# Patient Record
Sex: FEMALE | Employment: OTHER | URBAN - METROPOLITAN AREA
[De-identification: names, ages, dates, MRNs, and addresses within clinical notes are randomized per-mention and may not be internally consistent; named-entity substitution may affect disease eponyms.]

---

## 2018-01-01 ENCOUNTER — HOSPITAL ENCOUNTER (OUTPATIENT)
Dept: PHARMACY | Age: 83
Setting detail: THERAPIES SERIES
Discharge: HOME | End: 2018-09-28
Payer: MEDICARE

## 2018-01-01 ENCOUNTER — HOSPITAL ENCOUNTER (OUTPATIENT)
Age: 83
Discharge: HOME | End: 2018-11-07
Payer: MEDICARE

## 2018-01-01 ENCOUNTER — HOSPITAL ENCOUNTER (OUTPATIENT)
Age: 83
Discharge: HOME | End: 2018-06-15
Payer: MEDICARE

## 2018-01-01 ENCOUNTER — APPOINTMENT (OUTPATIENT)
Dept: CT IMAGING | Age: 83
DRG: 086 | End: 2018-01-01
Payer: MEDICARE

## 2018-01-01 ENCOUNTER — HOSPITAL ENCOUNTER (OUTPATIENT)
Dept: PHARMACY | Age: 83
Setting detail: THERAPIES SERIES
Discharge: HOME | End: 2018-08-01
Payer: MEDICARE

## 2018-01-01 ENCOUNTER — HOSPITAL ENCOUNTER (OUTPATIENT)
Dept: PHARMACY | Age: 83
Setting detail: THERAPIES SERIES
Discharge: HOME | End: 2018-12-05
Payer: MEDICARE

## 2018-01-01 ENCOUNTER — HOSPITAL ENCOUNTER (OUTPATIENT)
Dept: PHARMACY | Age: 83
Setting detail: THERAPIES SERIES
Discharge: HOME | End: 2018-06-22
Payer: MEDICARE

## 2018-01-01 ENCOUNTER — OFFICE VISIT (OUTPATIENT)
Dept: CARDIOLOGY CLINIC | Age: 83
End: 2018-01-01
Payer: MEDICARE

## 2018-01-01 ENCOUNTER — HOSPITAL ENCOUNTER (OUTPATIENT)
Dept: PHARMACY | Age: 83
Setting detail: THERAPIES SERIES
Discharge: HOME | End: 2018-07-25
Payer: MEDICARE

## 2018-01-01 ENCOUNTER — HOSPITAL ENCOUNTER (OUTPATIENT)
Dept: PHARMACY | Age: 83
Setting detail: THERAPIES SERIES
Discharge: HOME | End: 2018-11-07
Payer: MEDICARE

## 2018-01-01 ENCOUNTER — TELEPHONE (OUTPATIENT)
Dept: FAMILY MEDICINE CLINIC | Age: 83
End: 2018-01-01

## 2018-01-01 ENCOUNTER — ANTI-COAG VISIT (OUTPATIENT)
Dept: FAMILY MEDICINE CLINIC | Age: 83
End: 2018-01-01

## 2018-01-01 ENCOUNTER — CARE COORDINATION (OUTPATIENT)
Dept: CASE MANAGEMENT | Age: 83
End: 2018-01-01

## 2018-01-01 ENCOUNTER — HOSPITAL ENCOUNTER (OUTPATIENT)
Dept: PHARMACY | Age: 83
Setting detail: THERAPIES SERIES
Discharge: HOME | End: 2018-09-19
Payer: MEDICARE

## 2018-01-01 ENCOUNTER — HOSPITAL ENCOUNTER (INPATIENT)
Age: 83
LOS: 7 days | Discharge: HOME HEALTH | DRG: 177 | End: 2018-07-06
Attending: EMERGENCY MEDICINE | Admitting: INTERNAL MEDICINE
Payer: MEDICARE

## 2018-01-01 ENCOUNTER — HOSPITAL ENCOUNTER (OUTPATIENT)
Dept: PHARMACY | Age: 83
Setting detail: THERAPIES SERIES
Discharge: HOME | End: 2018-08-24
Payer: MEDICARE

## 2018-01-01 ENCOUNTER — APPOINTMENT (OUTPATIENT)
Dept: GENERAL RADIOLOGY | Age: 83
DRG: 177 | End: 2018-01-01
Payer: MEDICARE

## 2018-01-01 ENCOUNTER — HOSPITAL ENCOUNTER (OUTPATIENT)
Age: 83
Discharge: HOME | End: 2018-07-30
Payer: MEDICARE

## 2018-01-01 ENCOUNTER — TELEPHONE (OUTPATIENT)
Dept: PHARMACY | Facility: CLINIC | Age: 83
End: 2018-01-01

## 2018-01-01 ENCOUNTER — HOSPITAL ENCOUNTER (OUTPATIENT)
Dept: PHARMACY | Age: 83
Setting detail: THERAPIES SERIES
Discharge: HOME | End: 2018-10-19
Payer: MEDICARE

## 2018-01-01 ENCOUNTER — HOSPITAL ENCOUNTER (OUTPATIENT)
Age: 83
Discharge: HOME | End: 2018-08-01
Payer: MEDICARE

## 2018-01-01 ENCOUNTER — TELEPHONE (OUTPATIENT)
Dept: PHARMACY | Age: 83
End: 2018-01-01

## 2018-01-01 ENCOUNTER — OFFICE VISIT (OUTPATIENT)
Dept: FAMILY MEDICINE CLINIC | Age: 83
End: 2018-01-01
Payer: MEDICARE

## 2018-01-01 ENCOUNTER — HOSPITAL ENCOUNTER (OUTPATIENT)
Dept: GENERAL RADIOLOGY | Age: 83
Discharge: HOME | End: 2018-08-01
Payer: MEDICARE

## 2018-01-01 ENCOUNTER — HOSPITAL ENCOUNTER (OUTPATIENT)
Dept: PHARMACY | Age: 83
Setting detail: THERAPIES SERIES
Discharge: HOME | End: 2018-08-15
Payer: MEDICARE

## 2018-01-01 ENCOUNTER — HOSPITAL ENCOUNTER (OUTPATIENT)
Dept: PHARMACY | Age: 83
Setting detail: THERAPIES SERIES
Discharge: HOME | End: 2018-07-12
Payer: MEDICARE

## 2018-01-01 ENCOUNTER — HOSPITAL ENCOUNTER (INPATIENT)
Age: 83
LOS: 1 days | Discharge: HOME | DRG: 086 | End: 2018-12-07
Attending: EMERGENCY MEDICINE | Admitting: SURGERY
Payer: MEDICARE

## 2018-01-01 ENCOUNTER — HOSPITAL ENCOUNTER (OUTPATIENT)
Dept: PHARMACY | Age: 83
Setting detail: THERAPIES SERIES
Discharge: HOME | End: 2018-07-19
Payer: MEDICARE

## 2018-01-01 VITALS
SYSTOLIC BLOOD PRESSURE: 137 MMHG | WEIGHT: 93.8 LBS | BODY MASS INDEX: 18.32 KG/M2 | DIASTOLIC BLOOD PRESSURE: 76 MMHG | HEART RATE: 65 BPM

## 2018-01-01 VITALS
OXYGEN SATURATION: 98 % | TEMPERATURE: 98.3 F | WEIGHT: 91.5 LBS | RESPIRATION RATE: 18 BRPM | DIASTOLIC BLOOD PRESSURE: 60 MMHG | BODY MASS INDEX: 17.96 KG/M2 | HEIGHT: 60 IN | HEART RATE: 78 BPM | SYSTOLIC BLOOD PRESSURE: 112 MMHG

## 2018-01-01 VITALS
BODY MASS INDEX: 17.89 KG/M2 | WEIGHT: 91.6 LBS | HEART RATE: 69 BPM | DIASTOLIC BLOOD PRESSURE: 88 MMHG | SYSTOLIC BLOOD PRESSURE: 157 MMHG

## 2018-01-01 VITALS
BODY MASS INDEX: 17.97 KG/M2 | DIASTOLIC BLOOD PRESSURE: 84 MMHG | SYSTOLIC BLOOD PRESSURE: 153 MMHG | WEIGHT: 92 LBS | HEART RATE: 71 BPM

## 2018-01-01 VITALS
HEIGHT: 60 IN | HEART RATE: 66 BPM | DIASTOLIC BLOOD PRESSURE: 70 MMHG | WEIGHT: 90.8 LBS | RESPIRATION RATE: 28 BRPM | BODY MASS INDEX: 17.83 KG/M2 | SYSTOLIC BLOOD PRESSURE: 130 MMHG

## 2018-01-01 VITALS
WEIGHT: 94 LBS | TEMPERATURE: 96.5 F | HEIGHT: 60 IN | BODY MASS INDEX: 18.46 KG/M2 | OXYGEN SATURATION: 95 % | DIASTOLIC BLOOD PRESSURE: 61 MMHG | SYSTOLIC BLOOD PRESSURE: 131 MMHG | RESPIRATION RATE: 16 BRPM | HEART RATE: 72 BPM

## 2018-01-01 VITALS
BODY MASS INDEX: 17.69 KG/M2 | DIASTOLIC BLOOD PRESSURE: 85 MMHG | SYSTOLIC BLOOD PRESSURE: 139 MMHG | HEART RATE: 76 BPM | WEIGHT: 90.6 LBS

## 2018-01-01 VITALS
SYSTOLIC BLOOD PRESSURE: 150 MMHG | BODY MASS INDEX: 17.54 KG/M2 | HEART RATE: 79 BPM | DIASTOLIC BLOOD PRESSURE: 78 MMHG | WEIGHT: 89.8 LBS

## 2018-01-01 VITALS
BODY MASS INDEX: 17.89 KG/M2 | DIASTOLIC BLOOD PRESSURE: 77 MMHG | HEART RATE: 71 BPM | SYSTOLIC BLOOD PRESSURE: 133 MMHG | WEIGHT: 91.6 LBS

## 2018-01-01 VITALS
BODY MASS INDEX: 17.04 KG/M2 | RESPIRATION RATE: 16 BRPM | DIASTOLIC BLOOD PRESSURE: 51 MMHG | WEIGHT: 92.6 LBS | OXYGEN SATURATION: 100 % | SYSTOLIC BLOOD PRESSURE: 107 MMHG | HEART RATE: 79 BPM | TEMPERATURE: 98.1 F | HEIGHT: 62 IN

## 2018-01-01 VITALS
SYSTOLIC BLOOD PRESSURE: 106 MMHG | DIASTOLIC BLOOD PRESSURE: 68 MMHG | TEMPERATURE: 98.1 F | WEIGHT: 91.5 LBS | HEIGHT: 60 IN | OXYGEN SATURATION: 91 % | HEART RATE: 69 BPM | RESPIRATION RATE: 18 BRPM | BODY MASS INDEX: 17.96 KG/M2

## 2018-01-01 VITALS
DIASTOLIC BLOOD PRESSURE: 80 MMHG | HEART RATE: 69 BPM | WEIGHT: 91.6 LBS | BODY MASS INDEX: 17.89 KG/M2 | SYSTOLIC BLOOD PRESSURE: 134 MMHG

## 2018-01-01 VITALS
WEIGHT: 91.2 LBS | HEART RATE: 64 BPM | BODY MASS INDEX: 17.81 KG/M2 | SYSTOLIC BLOOD PRESSURE: 115 MMHG | DIASTOLIC BLOOD PRESSURE: 75 MMHG

## 2018-01-01 DIAGNOSIS — S06.5XAA SUBDURAL HEMATOMA: ICD-10-CM

## 2018-01-01 DIAGNOSIS — J15.1 PNEUMONIA OF RIGHT LOWER LOBE DUE TO PSEUDOMONAS SPECIES (HCC): ICD-10-CM

## 2018-01-01 DIAGNOSIS — I48.0 PAROXYSMAL ATRIAL FIBRILLATION (HCC): ICD-10-CM

## 2018-01-01 DIAGNOSIS — F41.9 ANXIETY: ICD-10-CM

## 2018-01-01 DIAGNOSIS — I48.0 PAROXYSMAL ATRIAL FIBRILLATION (HCC): Primary | ICD-10-CM

## 2018-01-01 DIAGNOSIS — I25.10 CORONARY ARTERY DISEASE INVOLVING NATIVE CORONARY ARTERY OF NATIVE HEART WITHOUT ANGINA PECTORIS: Chronic | ICD-10-CM

## 2018-01-01 DIAGNOSIS — Z23 NEED FOR INFLUENZA VACCINATION: ICD-10-CM

## 2018-01-01 DIAGNOSIS — E03.9 HYPOTHYROIDISM, UNSPECIFIED TYPE: ICD-10-CM

## 2018-01-01 DIAGNOSIS — E78.5 HYPERLIPIDEMIA, UNSPECIFIED HYPERLIPIDEMIA TYPE: ICD-10-CM

## 2018-01-01 DIAGNOSIS — Z79.01 LONG TERM CURRENT USE OF ANTICOAGULANT: ICD-10-CM

## 2018-01-01 DIAGNOSIS — I48.91 ATRIAL FIBRILLATION, UNSPECIFIED TYPE (HCC): ICD-10-CM

## 2018-01-01 DIAGNOSIS — I25.810 CORONARY ARTERY DISEASE INVOLVING CORONARY BYPASS GRAFT OF NATIVE HEART WITHOUT ANGINA PECTORIS: ICD-10-CM

## 2018-01-01 DIAGNOSIS — J44.1 CHRONIC OBSTRUCTIVE PULMONARY DISEASE WITH ACUTE EXACERBATION (HCC): ICD-10-CM

## 2018-01-01 DIAGNOSIS — Z79.01 LONG TERM CURRENT USE OF ANTICOAGULANT: Primary | ICD-10-CM

## 2018-01-01 DIAGNOSIS — S06.5XAA SDH (SUBDURAL HEMATOMA): Primary | ICD-10-CM

## 2018-01-01 DIAGNOSIS — R09.02 HYPOXEMIA: Primary | ICD-10-CM

## 2018-01-01 DIAGNOSIS — F41.9 ANXIETY: Primary | ICD-10-CM

## 2018-01-01 DIAGNOSIS — I25.10 CORONARY ARTERY DISEASE INVOLVING NATIVE CORONARY ARTERY OF NATIVE HEART WITHOUT ANGINA PECTORIS: ICD-10-CM

## 2018-01-01 DIAGNOSIS — I10 ESSENTIAL HYPERTENSION: ICD-10-CM

## 2018-01-01 DIAGNOSIS — J44.1 CHRONIC OBSTRUCTIVE PULMONARY DISEASE WITH ACUTE EXACERBATION (HCC): Primary | ICD-10-CM

## 2018-01-01 DIAGNOSIS — E03.8 OTHER SPECIFIED HYPOTHYROIDISM: ICD-10-CM

## 2018-01-01 DIAGNOSIS — J44.9 OBSTRUCTIVE CHRONIC BRONCHITIS WITHOUT EXACERBATION (HCC): ICD-10-CM

## 2018-01-01 DIAGNOSIS — J18.9 PNEUMONIA DUE TO ORGANISM: Primary | ICD-10-CM

## 2018-01-01 DIAGNOSIS — S09.90XA INJURY OF HEAD, INITIAL ENCOUNTER: Primary | ICD-10-CM

## 2018-01-01 DIAGNOSIS — I25.810 CORONARY ARTERY DISEASE INVOLVING CORONARY BYPASS GRAFT OF NATIVE HEART WITHOUT ANGINA PECTORIS: Primary | Chronic | ICD-10-CM

## 2018-01-01 LAB
% INHIBITION AA: 53.9 %
% INHIBITION ADP: 0 %
ABO/RH: NORMAL
ABO/RH: NORMAL
ALBUMIN SERPL-MCNC: 3.2 G/DL (ref 3.5–5.2)
ALBUMIN SERPL-MCNC: 3.9 G/DL (ref 3.5–5.2)
ALBUMIN SERPL-MCNC: 3.9 G/DL (ref 3.5–5.2)
ALBUMIN SERPL-MCNC: 4.2 G/DL (ref 3.5–5.2)
ALP BLD-CCNC: 64 U/L (ref 35–104)
ALP BLD-CCNC: 65 U/L (ref 35–104)
ALP BLD-CCNC: 82 U/L (ref 35–104)
ALP BLD-CCNC: 87 U/L (ref 35–104)
ALT SERPL-CCNC: 20 U/L (ref 0–32)
ALT SERPL-CCNC: 23 U/L (ref 0–32)
ALT SERPL-CCNC: 25 U/L (ref 0–32)
ALT SERPL-CCNC: 30 U/L (ref 0–32)
ANGLE (CLOT STRENGTH): 76.2 DEGREE (ref 59–74)
ANION GAP SERPL CALCULATED.3IONS-SCNC: 10 MMOL/L (ref 7–16)
ANION GAP SERPL CALCULATED.3IONS-SCNC: 10 MMOL/L (ref 7–16)
ANION GAP SERPL CALCULATED.3IONS-SCNC: 11 MMOL/L (ref 7–16)
ANION GAP SERPL CALCULATED.3IONS-SCNC: 8 MMOL/L (ref 7–16)
ANTIBODY SCREEN: NORMAL
AST SERPL-CCNC: 29 U/L (ref 0–31)
AST SERPL-CCNC: 31 U/L (ref 0–31)
AST SERPL-CCNC: 31 U/L (ref 0–31)
AST SERPL-CCNC: 39 U/L (ref 0–31)
BASOPHILS ABSOLUTE: 0.02 E9/L (ref 0–0.2)
BASOPHILS ABSOLUTE: 0.03 E9/L (ref 0–0.2)
BASOPHILS RELATIVE PERCENT: 0.2 % (ref 0–2)
BASOPHILS RELATIVE PERCENT: 0.4 % (ref 0–2)
BASOPHILS RELATIVE PERCENT: 0.7 % (ref 0–2)
BILIRUB SERPL-MCNC: 0.5 MG/DL (ref 0–1.2)
BILIRUBIN URINE: NEGATIVE
BLOOD BANK DISPENSE STATUS: NORMAL
BLOOD BANK DISPENSE STATUS: NORMAL
BLOOD BANK PRODUCT CODE: NORMAL
BLOOD BANK PRODUCT CODE: NORMAL
BLOOD CULTURE, ROUTINE: NORMAL
BLOOD, URINE: NEGATIVE
BPU ID: NORMAL
BPU ID: NORMAL
BUN BLDV-MCNC: 19 MG/DL (ref 8–23)
BUN BLDV-MCNC: 21 MG/DL (ref 8–23)
BUN BLDV-MCNC: 24 MG/DL (ref 8–23)
BUN BLDV-MCNC: 25 MG/DL (ref 8–23)
BUN BLDV-MCNC: 26 MG/DL (ref 8–23)
BUN BLDV-MCNC: 28 MG/DL (ref 8–23)
BUN BLDV-MCNC: 30 MG/DL (ref 8–23)
BUN BLDV-MCNC: 58 MG/DL (ref 8–23)
CALCIUM SERPL-MCNC: 9 MG/DL (ref 8.6–10.2)
CALCIUM SERPL-MCNC: 9.2 MG/DL (ref 8.6–10.2)
CALCIUM SERPL-MCNC: 9.3 MG/DL (ref 8.6–10.2)
CALCIUM SERPL-MCNC: 9.4 MG/DL (ref 8.6–10.2)
CALCIUM SERPL-MCNC: 9.5 MG/DL (ref 8.6–10.2)
CALCIUM SERPL-MCNC: 9.7 MG/DL (ref 8.6–10.2)
CALCIUM SERPL-MCNC: 9.7 MG/DL (ref 8.6–10.2)
CALCIUM SERPL-MCNC: 9.9 MG/DL (ref 8.6–10.2)
CHLORIDE BLD-SCNC: 101 MMOL/L (ref 98–107)
CHLORIDE BLD-SCNC: 102 MMOL/L (ref 98–107)
CHLORIDE BLD-SCNC: 102 MMOL/L (ref 98–107)
CHLORIDE BLD-SCNC: 96 MMOL/L (ref 98–107)
CHLORIDE BLD-SCNC: 96 MMOL/L (ref 98–107)
CHLORIDE BLD-SCNC: 97 MMOL/L (ref 98–107)
CHLORIDE BLD-SCNC: 98 MMOL/L (ref 98–107)
CHLORIDE BLD-SCNC: 99 MMOL/L (ref 98–107)
CHOLESTEROL, TOTAL: 190 MG/DL (ref 0–199)
CHOLESTEROL, TOTAL: 222 MG/DL (ref 0–199)
CLARITY: CLEAR
CO2: 32 MMOL/L (ref 22–29)
CO2: 33 MMOL/L (ref 22–29)
CO2: 34 MMOL/L (ref 22–29)
CO2: 35 MMOL/L (ref 22–29)
CO2: 35 MMOL/L (ref 22–29)
CO2: 36 MMOL/L (ref 22–29)
CO2: 40 MMOL/L (ref 22–29)
CO2: 40 MMOL/L (ref 22–29)
COLOR: YELLOW
CREAT SERPL-MCNC: 0.7 MG/DL (ref 0.5–1)
CREAT SERPL-MCNC: 0.8 MG/DL (ref 0.5–1)
CREAT SERPL-MCNC: 0.9 MG/DL (ref 0.5–1)
CREAT SERPL-MCNC: 1 MG/DL (ref 0.5–1)
CULTURE, BLOOD 2: NORMAL
CULTURE, RESPIRATORY: ABNORMAL
CULTURE, RESPIRATORY: ABNORMAL
DESCRIPTION BLOOD BANK: NORMAL
DESCRIPTION BLOOD BANK: NORMAL
EKG ATRIAL RATE: 106 BPM
EKG P-R INTERVAL: 144 MS
EKG Q-T INTERVAL: 324 MS
EKG QRS DURATION: 76 MS
EKG QTC CALCULATION (BAZETT): 430 MS
EKG R AXIS: 75 DEGREES
EKG T AXIS: -54 DEGREES
EKG VENTRICULAR RATE: 106 BPM
EOSINOPHILS ABSOLUTE: 0.01 E9/L (ref 0.05–0.5)
EOSINOPHILS ABSOLUTE: 0.08 E9/L (ref 0.05–0.5)
EOSINOPHILS ABSOLUTE: 0.09 E9/L (ref 0.05–0.5)
EOSINOPHILS ABSOLUTE: 0.1 E9/L (ref 0.05–0.5)
EOSINOPHILS ABSOLUTE: 0.11 E9/L (ref 0.05–0.5)
EOSINOPHILS RELATIVE PERCENT: 0.1 % (ref 0–6)
EOSINOPHILS RELATIVE PERCENT: 1.6 % (ref 0–6)
EOSINOPHILS RELATIVE PERCENT: 1.6 % (ref 0–6)
EOSINOPHILS RELATIVE PERCENT: 2 % (ref 0–6)
EOSINOPHILS RELATIVE PERCENT: 2.5 % (ref 0–6)
EPL-TEG: 0.4 % (ref 0–15)
FILM ARRAY ADENOVIRUS: ABNORMAL
FILM ARRAY BORDETELLA PERTUSSIS: ABNORMAL
FILM ARRAY CHLAMYDOPHILIA PNEUMONIAE: ABNORMAL
FILM ARRAY CORONAVIRUS 229E: ABNORMAL
FILM ARRAY CORONAVIRUS HKU1: ABNORMAL
FILM ARRAY CORONAVIRUS NL63: ABNORMAL
FILM ARRAY CORONAVIRUS OC43: ABNORMAL
FILM ARRAY INFLUENZA A VIRUS 09H1: ABNORMAL
FILM ARRAY INFLUENZA A VIRUS H1: ABNORMAL
FILM ARRAY INFLUENZA A VIRUS H3: ABNORMAL
FILM ARRAY INFLUENZA A VIRUS: ABNORMAL
FILM ARRAY INFLUENZA B: ABNORMAL
FILM ARRAY METAPNEUMOVIRUS: ABNORMAL
FILM ARRAY MYCOPLASMA PNEUMONIAE: ABNORMAL
FILM ARRAY PARAINFLUENZA VIRUS 1: ABNORMAL
FILM ARRAY PARAINFLUENZA VIRUS 2: ABNORMAL
FILM ARRAY PARAINFLUENZA VIRUS 4: ABNORMAL
FILM ARRAY RESPIRATORY SYNCITIAL VIRUS: ABNORMAL
FILM ARRAY RHINOVIRUS/ENTEROVIRUS: ABNORMAL
G-TEG: 8.5 K D/SC (ref 4.5–11)
GFR AFRICAN AMERICAN: >60
GFR NON-AFRICAN AMERICAN: 53 ML/MIN/1.73
GFR NON-AFRICAN AMERICAN: 60 ML/MIN/1.73
GFR NON-AFRICAN AMERICAN: >60 ML/MIN/1.73
GLUCOSE BLD-MCNC: 102 MG/DL (ref 74–109)
GLUCOSE BLD-MCNC: 119 MG/DL (ref 74–99)
GLUCOSE BLD-MCNC: 123 MG/DL (ref 74–109)
GLUCOSE BLD-MCNC: 132 MG/DL (ref 74–109)
GLUCOSE BLD-MCNC: 75 MG/DL (ref 74–109)
GLUCOSE BLD-MCNC: 87 MG/DL (ref 74–109)
GLUCOSE BLD-MCNC: 89 MG/DL (ref 74–99)
GLUCOSE BLD-MCNC: 93 MG/DL (ref 74–99)
GLUCOSE URINE: NEGATIVE MG/DL
HCT VFR BLD CALC: 26.7 % (ref 34–48)
HCT VFR BLD CALC: 28.2 % (ref 34–48)
HCT VFR BLD CALC: 30.8 % (ref 34–48)
HCT VFR BLD CALC: 34.5 % (ref 34–48)
HCT VFR BLD CALC: 34.7 % (ref 34–48)
HCT VFR BLD CALC: 35.8 % (ref 34–48)
HCT VFR BLD CALC: 37.7 % (ref 34–48)
HCT VFR BLD CALC: 38.2 % (ref 34–48)
HDLC SERPL-MCNC: 107 MG/DL
HDLC SERPL-MCNC: 134 MG/DL
HEMOGLOBIN: 10.6 G/DL (ref 11.5–15.5)
HEMOGLOBIN: 10.7 G/DL (ref 11.5–15.5)
HEMOGLOBIN: 11.2 G/DL (ref 11.5–15.5)
HEMOGLOBIN: 11.8 G/DL (ref 11.5–15.5)
HEMOGLOBIN: 12 G/DL (ref 11.5–15.5)
HEMOGLOBIN: 8 G/DL (ref 11.5–15.5)
HEMOGLOBIN: 8.6 G/DL (ref 11.5–15.5)
HEMOGLOBIN: 9.4 G/DL (ref 11.5–15.5)
HYPOCHROMIA: ABNORMAL
IMMATURE GRANULOCYTES #: 0.01 E9/L
IMMATURE GRANULOCYTES #: 0.01 E9/L
IMMATURE GRANULOCYTES #: 0.02 E9/L
IMMATURE GRANULOCYTES #: 0.02 E9/L
IMMATURE GRANULOCYTES #: 0.07 E9/L
IMMATURE GRANULOCYTES %: 0.2 % (ref 0–5)
IMMATURE GRANULOCYTES %: 0.2 % (ref 0–5)
IMMATURE GRANULOCYTES %: 0.4 % (ref 0–5)
IMMATURE GRANULOCYTES %: 0.4 % (ref 0–5)
IMMATURE GRANULOCYTES %: 0.6 % (ref 0–5)
INR BLD: 1.1
INR BLD: 1.2
INR BLD: 1.9
INR BLD: 2
INR BLD: 2.1
INR BLD: 2.2
INR BLD: 2.2
INR BLD: 2.4
INR BLD: 2.4
INR BLD: 2.6
INR BLD: 2.8
INR BLD: 2.9
INR BLD: 3
INR BLD: 3.9
INTERNATIONAL NORMALIZATION RATIO, POC: 1.7
INTERNATIONAL NORMALIZATION RATIO, POC: 1.9
INTERNATIONAL NORMALIZATION RATIO, POC: 2.2
INTERNATIONAL NORMALIZATION RATIO, POC: 2.4
INTERNATIONAL NORMALIZATION RATIO, POC: 2.5
INTERNATIONAL NORMALIZATION RATIO, POC: 2.7
INTERNATIONAL NORMALIZATION RATIO, POC: 3.8
INTERNATIONAL NORMALIZATION RATIO, POC: 4.4
K (CLOTTING TIME): 0.9 MIN (ref 1–3)
KETONES, URINE: NEGATIVE MG/DL
L. PNEUMOPHILA SEROGP 1 UR AG: NORMAL
LACTIC ACID: 1.2 MMOL/L (ref 0.5–2.2)
LDL CHOLESTEROL CALCULATED: 71 MG/DL (ref 0–99)
LDL CHOLESTEROL CALCULATED: 73 MG/DL (ref 0–99)
LEUKOCYTE ESTERASE, URINE: NEGATIVE
LY30 (FIBRINOLYSIS): 0.4 % (ref 0–8)
LYMPHOCYTES ABSOLUTE: 0.39 E9/L (ref 1.5–4)
LYMPHOCYTES ABSOLUTE: 0.68 E9/L (ref 1.5–4)
LYMPHOCYTES ABSOLUTE: 0.72 E9/L (ref 1.5–4)
LYMPHOCYTES ABSOLUTE: 0.82 E9/L (ref 1.5–4)
LYMPHOCYTES ABSOLUTE: 0.88 E9/L (ref 1.5–4)
LYMPHOCYTES RELATIVE PERCENT: 13.9 % (ref 20–42)
LYMPHOCYTES RELATIVE PERCENT: 15.9 % (ref 20–42)
LYMPHOCYTES RELATIVE PERCENT: 16.4 % (ref 20–42)
LYMPHOCYTES RELATIVE PERCENT: 16.8 % (ref 20–42)
LYMPHOCYTES RELATIVE PERCENT: 3.2 % (ref 20–42)
MA (MAX AMPLITUDE): 62.9 MM (ref 50–70)
MA-AA: 34.9 MM
MA-ACTIVATED: 11 MM
MA-ADP: 71 MM
MA-TEG BASELINE: 62.9 MM
MAGNESIUM: 1.4 MG/DL (ref 1.6–2.6)
MCH RBC QN AUTO: 28.3 PG (ref 26–35)
MCH RBC QN AUTO: 28.4 PG (ref 26–35)
MCH RBC QN AUTO: 28.4 PG (ref 26–35)
MCH RBC QN AUTO: 31.5 PG (ref 26–35)
MCH RBC QN AUTO: 31.8 PG (ref 26–35)
MCH RBC QN AUTO: 32 PG (ref 26–35)
MCH RBC QN AUTO: 32.2 PG (ref 26–35)
MCH RBC QN AUTO: 32.5 PG (ref 26–35)
MCHC RBC AUTO-ENTMCNC: 29.9 % (ref 32–34.5)
MCHC RBC AUTO-ENTMCNC: 30 % (ref 32–34.5)
MCHC RBC AUTO-ENTMCNC: 30.5 % (ref 32–34.5)
MCHC RBC AUTO-ENTMCNC: 30.5 % (ref 32–34.5)
MCHC RBC AUTO-ENTMCNC: 30.7 % (ref 32–34.5)
MCHC RBC AUTO-ENTMCNC: 31.3 % (ref 32–34.5)
MCHC RBC AUTO-ENTMCNC: 31.4 % (ref 32–34.5)
MCHC RBC AUTO-ENTMCNC: 32.3 % (ref 32–34.5)
MCV RBC AUTO: 100.6 FL (ref 80–99.9)
MCV RBC AUTO: 101.6 FL (ref 80–99.9)
MCV RBC AUTO: 102.4 FL (ref 80–99.9)
MCV RBC AUTO: 104.2 FL (ref 80–99.9)
MCV RBC AUTO: 105.3 FL (ref 80–99.9)
MCV RBC AUTO: 93.1 FL (ref 80–99.9)
MCV RBC AUTO: 93.1 FL (ref 80–99.9)
MCV RBC AUTO: 94.3 FL (ref 80–99.9)
MONOCYTES ABSOLUTE: 0.46 E9/L (ref 0.1–0.95)
MONOCYTES ABSOLUTE: 0.47 E9/L (ref 0.1–0.95)
MONOCYTES ABSOLUTE: 0.48 E9/L (ref 0.1–0.95)
MONOCYTES ABSOLUTE: 0.62 E9/L (ref 0.1–0.95)
MONOCYTES ABSOLUTE: 0.84 E9/L (ref 0.1–0.95)
MONOCYTES RELATIVE PERCENT: 11 % (ref 2–12)
MONOCYTES RELATIVE PERCENT: 12.7 % (ref 2–12)
MONOCYTES RELATIVE PERCENT: 6.9 % (ref 2–12)
MONOCYTES RELATIVE PERCENT: 8.3 % (ref 2–12)
MONOCYTES RELATIVE PERCENT: 9.6 % (ref 2–12)
NEUTROPHILS ABSOLUTE: 10.9 E9/L (ref 1.8–7.3)
NEUTROPHILS ABSOLUTE: 3.03 E9/L (ref 1.8–7.3)
NEUTROPHILS ABSOLUTE: 3.31 E9/L (ref 1.8–7.3)
NEUTROPHILS ABSOLUTE: 3.61 E9/L (ref 1.8–7.3)
NEUTROPHILS ABSOLUTE: 4.09 E9/L (ref 1.8–7.3)
NEUTROPHILS RELATIVE PERCENT: 67.7 % (ref 43–80)
NEUTROPHILS RELATIVE PERCENT: 69.2 % (ref 43–80)
NEUTROPHILS RELATIVE PERCENT: 73.6 % (ref 43–80)
NEUTROPHILS RELATIVE PERCENT: 74.1 % (ref 43–80)
NEUTROPHILS RELATIVE PERCENT: 89 % (ref 43–80)
NITRITE, URINE: NEGATIVE
ORGANISM: ABNORMAL
ORGANISM: ABNORMAL
PDW BLD-RTO: 13.2 FL (ref 11.5–15)
PDW BLD-RTO: 13.2 FL (ref 11.5–15)
PDW BLD-RTO: 13.4 FL (ref 11.5–15)
PDW BLD-RTO: 13.7 FL (ref 11.5–15)
PDW BLD-RTO: 14.7 FL (ref 11.5–15)
PDW BLD-RTO: 15.9 FL (ref 11.5–15)
PDW BLD-RTO: 15.9 FL (ref 11.5–15)
PDW BLD-RTO: 16 FL (ref 11.5–15)
PH UA: 8 (ref 5–9)
PLATELET # BLD: 182 E9/L (ref 130–450)
PLATELET # BLD: 216 E9/L (ref 130–450)
PLATELET # BLD: 224 E9/L (ref 130–450)
PLATELET # BLD: 231 E9/L (ref 130–450)
PLATELET # BLD: 231 E9/L (ref 130–450)
PLATELET # BLD: 236 E9/L (ref 130–450)
PLATELET # BLD: 237 E9/L (ref 130–450)
PLATELET # BLD: 311 E9/L (ref 130–450)
PMV BLD AUTO: 10.1 FL (ref 7–12)
PMV BLD AUTO: 10.3 FL (ref 7–12)
PMV BLD AUTO: 10.4 FL (ref 7–12)
PMV BLD AUTO: 9.6 FL (ref 7–12)
PMV BLD AUTO: 9.7 FL (ref 7–12)
PMV BLD AUTO: 9.7 FL (ref 7–12)
PMV BLD AUTO: 9.8 FL (ref 7–12)
PMV BLD AUTO: 9.8 FL (ref 7–12)
POLYCHROMASIA: ABNORMAL
POTASSIUM REFLEX MAGNESIUM: 3.5 MMOL/L (ref 3.5–5)
POTASSIUM REFLEX MAGNESIUM: 3.9 MMOL/L (ref 3.5–5)
POTASSIUM SERPL-SCNC: 3.8 MMOL/L (ref 3.5–5)
POTASSIUM SERPL-SCNC: 4 MMOL/L (ref 3.5–5)
POTASSIUM SERPL-SCNC: 4.1 MMOL/L (ref 3.5–5)
POTASSIUM SERPL-SCNC: 4.2 MMOL/L (ref 3.5–5)
POTASSIUM SERPL-SCNC: 4.2 MMOL/L (ref 3.5–5)
POTASSIUM SERPL-SCNC: 4.8 MMOL/L (ref 3.5–5)
PRO-BNP: 1093 PG/ML (ref 0–450)
PRO-BNP: 1480 PG/ML (ref 0–450)
PROTEIN UA: NEGATIVE MG/DL
PROTHROMBIN TIME: 13 SEC (ref 9.3–12.4)
PROTHROMBIN TIME: 13.7 SEC (ref 9.3–12.4)
PROTHROMBIN TIME: 22.3 SEC (ref 9.3–12.4)
PROTHROMBIN TIME: 23.3 SEC (ref 9.3–12.4)
PROTHROMBIN TIME: 25.6 SEC (ref 9.3–12.4)
PROTHROMBIN TIME: 27.6 SEC (ref 9.3–12.4)
PROTHROMBIN TIME: 27.6 SEC (ref 9.3–12.4)
PROTHROMBIN TIME: 29.3 SEC (ref 9.3–12.4)
PROTHROMBIN TIME: 30.5 SEC (ref 9.3–12.4)
PROTHROMBIN TIME: 32.7 SEC (ref 9.3–12.4)
PROTHROMBIN TIME: 34.1 SEC (ref 9.3–12.4)
PROTHROMBIN TIME: 43.3 SEC (ref 9.3–12.4)
R (REACTION TIME): 5.6 MIN (ref 5–10)
RBC # BLD: 2.83 E12/L (ref 3.5–5.5)
RBC # BLD: 3.03 E12/L (ref 3.5–5.5)
RBC # BLD: 3.31 E12/L (ref 3.5–5.5)
RBC # BLD: 3.31 E12/L (ref 3.5–5.5)
RBC # BLD: 3.4 E12/L (ref 3.5–5.5)
RBC # BLD: 3.45 E12/L (ref 3.5–5.5)
RBC # BLD: 3.71 E12/L (ref 3.5–5.5)
RBC # BLD: 3.73 E12/L (ref 3.5–5.5)
SMEAR, RESPIRATORY: ABNORMAL
SODIUM BLD-SCNC: 142 MMOL/L (ref 132–146)
SODIUM BLD-SCNC: 142 MMOL/L (ref 132–146)
SODIUM BLD-SCNC: 144 MMOL/L (ref 132–146)
SODIUM BLD-SCNC: 145 MMOL/L (ref 132–146)
SODIUM BLD-SCNC: 146 MMOL/L (ref 132–146)
SODIUM BLD-SCNC: 146 MMOL/L (ref 132–146)
SPECIFIC GRAVITY UA: 1.01 (ref 1–1.03)
STREP PNEUMONIAE ANTIGEN, URINE: NORMAL
TOTAL PROTEIN: 6.3 G/DL (ref 6.4–8.3)
TOTAL PROTEIN: 6.5 G/DL (ref 6.4–8.3)
TOTAL PROTEIN: 6.7 G/DL (ref 6.4–8.3)
TOTAL PROTEIN: 6.8 G/DL (ref 6.4–8.3)
TRIGL SERPL-MCNC: 61 MG/DL (ref 0–149)
TRIGL SERPL-MCNC: 77 MG/DL (ref 0–149)
TROPONIN: 0.01 NG/ML (ref 0–0.03)
TSH SERPL DL<=0.05 MIU/L-ACNC: 4.62 UIU/ML (ref 0.27–4.2)
UROBILINOGEN, URINE: 0.2 E.U./DL
VLDLC SERPL CALC-MCNC: 12 MG/DL
VLDLC SERPL CALC-MCNC: 15 MG/DL
WBC # BLD: 10.8 E9/L (ref 4.5–11.5)
WBC # BLD: 12.2 E9/L (ref 4.5–11.5)
WBC # BLD: 12.8 E9/L (ref 4.5–11.5)
WBC # BLD: 4.4 E9/L (ref 4.5–11.5)
WBC # BLD: 4.8 E9/L (ref 4.5–11.5)
WBC # BLD: 4.9 E9/L (ref 4.5–11.5)
WBC # BLD: 4.9 E9/L (ref 4.5–11.5)
WBC # BLD: 5.6 E9/L (ref 4.5–11.5)

## 2018-01-01 PROCEDURE — 71045 X-RAY EXAM CHEST 1 VIEW: CPT

## 2018-01-01 PROCEDURE — 87186 SC STD MICRODIL/AGAR DIL: CPT

## 2018-01-01 PROCEDURE — 6370000000 HC RX 637 (ALT 250 FOR IP): Performed by: STUDENT IN AN ORGANIZED HEALTH CARE EDUCATION/TRAINING PROGRAM

## 2018-01-01 PROCEDURE — 80053 COMPREHEN METABOLIC PANEL: CPT

## 2018-01-01 PROCEDURE — 6360000002 HC RX W HCPCS: Performed by: INTERNAL MEDICINE

## 2018-01-01 PROCEDURE — G0008 ADMIN INFLUENZA VIRUS VAC: HCPCS | Performed by: INTERNAL MEDICINE

## 2018-01-01 PROCEDURE — 96365 THER/PROPH/DIAG IV INF INIT: CPT

## 2018-01-01 PROCEDURE — 2500000003 HC RX 250 WO HCPCS: Performed by: EMERGENCY MEDICINE

## 2018-01-01 PROCEDURE — 93000 ELECTROCARDIOGRAM COMPLETE: CPT | Performed by: INTERNAL MEDICINE

## 2018-01-01 PROCEDURE — 6370000000 HC RX 637 (ALT 250 FOR IP): Performed by: INTERNAL MEDICINE

## 2018-01-01 PROCEDURE — 94640 AIRWAY INHALATION TREATMENT: CPT

## 2018-01-01 PROCEDURE — 3288F FALL RISK ASSESSMENT DOCD: CPT | Performed by: INTERNAL MEDICINE

## 2018-01-01 PROCEDURE — 99211 OFF/OP EST MAY X REQ PHY/QHP: CPT

## 2018-01-01 PROCEDURE — 2580000003 HC RX 258: Performed by: INTERNAL MEDICINE

## 2018-01-01 PROCEDURE — G8988 SELF CARE GOAL STATUS: HCPCS

## 2018-01-01 PROCEDURE — 85027 COMPLETE CBC AUTOMATED: CPT

## 2018-01-01 PROCEDURE — 85610 PROTHROMBIN TIME: CPT

## 2018-01-01 PROCEDURE — 97161 PT EVAL LOW COMPLEX 20 MIN: CPT

## 2018-01-01 PROCEDURE — 6370000000 HC RX 637 (ALT 250 FOR IP): Performed by: SURGERY

## 2018-01-01 PROCEDURE — 2700000000 HC OXYGEN THERAPY PER DAY

## 2018-01-01 PROCEDURE — 97530 THERAPEUTIC ACTIVITIES: CPT

## 2018-01-01 PROCEDURE — 70450 CT HEAD/BRAIN W/O DYE: CPT

## 2018-01-01 PROCEDURE — G8979 MOBILITY GOAL STATUS: HCPCS

## 2018-01-01 PROCEDURE — 36415 COLL VENOUS BLD VENIPUNCTURE: CPT

## 2018-01-01 PROCEDURE — 2580000003 HC RX 258: Performed by: STUDENT IN AN ORGANIZED HEALTH CARE EDUCATION/TRAINING PROGRAM

## 2018-01-01 PROCEDURE — 6360000002 HC RX W HCPCS: Performed by: STUDENT IN AN ORGANIZED HEALTH CARE EDUCATION/TRAINING PROGRAM

## 2018-01-01 PROCEDURE — 80048 BASIC METABOLIC PNL TOTAL CA: CPT

## 2018-01-01 PROCEDURE — 84443 ASSAY THYROID STIM HORMONE: CPT

## 2018-01-01 PROCEDURE — 6370000000 HC RX 637 (ALT 250 FOR IP): Performed by: EMERGENCY MEDICINE

## 2018-01-01 PROCEDURE — 80061 LIPID PANEL: CPT

## 2018-01-01 PROCEDURE — 85347 COAGULATION TIME ACTIVATED: CPT

## 2018-01-01 PROCEDURE — 99285 EMERGENCY DEPT VISIT HI MDM: CPT

## 2018-01-01 PROCEDURE — 87040 BLOOD CULTURE FOR BACTERIA: CPT

## 2018-01-01 PROCEDURE — P9059 PLASMA, FRZ BETWEEN 8-24HOUR: HCPCS

## 2018-01-01 PROCEDURE — 81003 URINALYSIS AUTO W/O SCOPE: CPT

## 2018-01-01 PROCEDURE — 2580000003 HC RX 258: Performed by: EMERGENCY MEDICINE

## 2018-01-01 PROCEDURE — 83880 ASSAY OF NATRIURETIC PEPTIDE: CPT

## 2018-01-01 PROCEDURE — 97166 OT EVAL MOD COMPLEX 45 MIN: CPT

## 2018-01-01 PROCEDURE — 2060000000 HC ICU INTERMEDIATE R&B

## 2018-01-01 PROCEDURE — 99213 OFFICE O/P EST LOW 20 MIN: CPT | Performed by: INTERNAL MEDICINE

## 2018-01-01 PROCEDURE — 85025 COMPLETE CBC W/AUTO DIFF WBC: CPT

## 2018-01-01 PROCEDURE — 92523 SPEECH SOUND LANG COMPREHEN: CPT

## 2018-01-01 PROCEDURE — 94150 VITAL CAPACITY TEST: CPT

## 2018-01-01 PROCEDURE — 1111F DSCHRG MED/CURRENT MED MERGE: CPT | Performed by: INTERNAL MEDICINE

## 2018-01-01 PROCEDURE — G8987 SELF CARE CURRENT STATUS: HCPCS

## 2018-01-01 PROCEDURE — 87581 M.PNEUMON DNA AMP PROBE: CPT

## 2018-01-01 PROCEDURE — G8978 MOBILITY CURRENT STATUS: HCPCS

## 2018-01-01 PROCEDURE — 96375 TX/PRO/DX INJ NEW DRUG ADDON: CPT

## 2018-01-01 PROCEDURE — 87450 HC DIRECT STREP B ANTIGEN: CPT

## 2018-01-01 PROCEDURE — 99223 1ST HOSP IP/OBS HIGH 75: CPT | Performed by: SURGERY

## 2018-01-01 PROCEDURE — 97165 OT EVAL LOW COMPLEX 30 MIN: CPT

## 2018-01-01 PROCEDURE — 84484 ASSAY OF TROPONIN QUANT: CPT

## 2018-01-01 PROCEDURE — 87503 INFLUENZA DNA AMP PROB ADDL: CPT

## 2018-01-01 PROCEDURE — 86900 BLOOD TYPING SEROLOGIC ABO: CPT

## 2018-01-01 PROCEDURE — 86850 RBC ANTIBODY SCREEN: CPT

## 2018-01-01 PROCEDURE — 97110 THERAPEUTIC EXERCISES: CPT

## 2018-01-01 PROCEDURE — 99222 1ST HOSP IP/OBS MODERATE 55: CPT | Performed by: NEUROLOGICAL SURGERY

## 2018-01-01 PROCEDURE — 94664 DEMO&/EVAL PT USE INHALER: CPT

## 2018-01-01 PROCEDURE — 36430 TRANSFUSION BLD/BLD COMPNT: CPT

## 2018-01-01 PROCEDURE — 87205 SMEAR GRAM STAIN: CPT

## 2018-01-01 PROCEDURE — 99495 TRANSJ CARE MGMT MOD F2F 14D: CPT | Performed by: INTERNAL MEDICINE

## 2018-01-01 PROCEDURE — 1111F DSCHRG MED/CURRENT MED MERGE: CPT

## 2018-01-01 PROCEDURE — 71046 X-RAY EXAM CHEST 2 VIEWS: CPT

## 2018-01-01 PROCEDURE — 97162 PT EVAL MOD COMPLEX 30 MIN: CPT

## 2018-01-01 PROCEDURE — 86901 BLOOD TYPING SEROLOGIC RH(D): CPT

## 2018-01-01 PROCEDURE — 99238 HOSP IP/OBS DSCHRG MGMT 30/<: CPT | Performed by: SURGERY

## 2018-01-01 PROCEDURE — 6360000002 HC RX W HCPCS: Performed by: EMERGENCY MEDICINE

## 2018-01-01 PROCEDURE — 97535 SELF CARE MNGMENT TRAINING: CPT

## 2018-01-01 PROCEDURE — 83735 ASSAY OF MAGNESIUM: CPT

## 2018-01-01 PROCEDURE — 87502 INFLUENZA DNA AMP PROBE: CPT

## 2018-01-01 PROCEDURE — 87798 DETECT AGENT NOS DNA AMP: CPT

## 2018-01-01 PROCEDURE — 94761 N-INVAS EAR/PLS OXIMETRY MLT: CPT

## 2018-01-01 PROCEDURE — 83605 ASSAY OF LACTIC ACID: CPT

## 2018-01-01 PROCEDURE — 93005 ELECTROCARDIOGRAM TRACING: CPT | Performed by: EMERGENCY MEDICINE

## 2018-01-01 PROCEDURE — 87486 CHLMYD PNEUM DNA AMP PROBE: CPT

## 2018-01-01 PROCEDURE — 72125 CT NECK SPINE W/O DYE: CPT

## 2018-01-01 PROCEDURE — 99213 OFFICE O/P EST LOW 20 MIN: CPT | Performed by: CLINICAL NURSE SPECIALIST

## 2018-01-01 PROCEDURE — 87070 CULTURE OTHR SPECIMN AEROBIC: CPT

## 2018-01-01 PROCEDURE — 87077 CULTURE AEROBIC IDENTIFY: CPT

## 2018-01-01 PROCEDURE — 85576 BLOOD PLATELET AGGREGATION: CPT

## 2018-01-01 PROCEDURE — 85384 FIBRINOGEN ACTIVITY: CPT

## 2018-01-01 PROCEDURE — 90662 IIV NO PRSV INCREASED AG IM: CPT | Performed by: INTERNAL MEDICINE

## 2018-01-01 RX ORDER — LEVETIRACETAM 500 MG/1
500 TABLET ORAL 2 TIMES DAILY
Status: DISCONTINUED | OUTPATIENT
Start: 2018-01-01 | End: 2018-01-01 | Stop reason: HOSPADM

## 2018-01-01 RX ORDER — SODIUM CHLORIDE 0.9 % (FLUSH) 0.9 %
10 SYRINGE (ML) INJECTION PRN
Status: DISCONTINUED | OUTPATIENT
Start: 2018-01-01 | End: 2018-01-01 | Stop reason: HOSPADM

## 2018-01-01 RX ORDER — ATORVASTATIN CALCIUM 10 MG/1
10 TABLET, FILM COATED ORAL DAILY
Status: DISCONTINUED | OUTPATIENT
Start: 2018-01-01 | End: 2018-01-01 | Stop reason: HOSPADM

## 2018-01-01 RX ORDER — IPRATROPIUM BROMIDE AND ALBUTEROL SULFATE 2.5; .5 MG/3ML; MG/3ML
3 SOLUTION RESPIRATORY (INHALATION) ONCE
Status: COMPLETED | OUTPATIENT
Start: 2018-01-01 | End: 2018-01-01

## 2018-01-01 RX ORDER — LEVOTHYROXINE SODIUM 0.07 MG/1
75 TABLET ORAL
Status: DISCONTINUED | OUTPATIENT
Start: 2018-01-01 | End: 2018-01-01 | Stop reason: HOSPADM

## 2018-01-01 RX ORDER — HYDROCHLOROTHIAZIDE 25 MG/1
25 TABLET ORAL DAILY
Status: DISCONTINUED | OUTPATIENT
Start: 2018-01-01 | End: 2018-01-01

## 2018-01-01 RX ORDER — PREDNISONE 10 MG/1
10 TABLET ORAL DAILY
COMMUNITY
Start: 2018-01-01 | End: 2018-01-01

## 2018-01-01 RX ORDER — LEVOTHYROXINE SODIUM 0.07 MG/1
75 TABLET ORAL DAILY
Status: DISCONTINUED | OUTPATIENT
Start: 2018-01-01 | End: 2018-01-01 | Stop reason: HOSPADM

## 2018-01-01 RX ORDER — METOPROLOL TARTRATE 50 MG/1
50 TABLET, FILM COATED ORAL 2 TIMES DAILY
Status: DISCONTINUED | OUTPATIENT
Start: 2018-01-01 | End: 2018-01-01 | Stop reason: HOSPADM

## 2018-01-01 RX ORDER — WARFARIN SODIUM 2.5 MG/1
5 TABLET ORAL WEEKLY
COMMUNITY
End: 2018-01-01 | Stop reason: DRUGHIGH

## 2018-01-01 RX ORDER — HYDROCHLOROTHIAZIDE 25 MG/1
25 TABLET ORAL DAILY
Status: DISCONTINUED | OUTPATIENT
Start: 2018-01-01 | End: 2018-01-01 | Stop reason: HOSPADM

## 2018-01-01 RX ORDER — ALBUTEROL SULFATE 0.63 MG/3ML
0.63 SOLUTION RESPIRATORY (INHALATION) 4 TIMES DAILY
Status: DISCONTINUED | OUTPATIENT
Start: 2018-01-01 | End: 2018-01-01 | Stop reason: HOSPADM

## 2018-01-01 RX ORDER — OYSTER SHELL CALCIUM WITH VITAMIN D 500; 200 MG/1; [IU]/1
1 TABLET, FILM COATED ORAL DAILY
Status: DISCONTINUED | OUTPATIENT
Start: 2018-01-01 | End: 2018-01-01 | Stop reason: HOSPADM

## 2018-01-01 RX ORDER — PREDNISONE 10 MG/1
TABLET ORAL
Qty: 18 TABLET | Refills: 0 | Status: SHIPPED | OUTPATIENT
Start: 2018-01-01 | End: 2018-01-01 | Stop reason: ALTCHOICE

## 2018-01-01 RX ORDER — SODIUM CHLORIDE 9 MG/ML
INJECTION, SOLUTION INTRAVENOUS CONTINUOUS
Status: DISCONTINUED | OUTPATIENT
Start: 2018-01-01 | End: 2018-01-01

## 2018-01-01 RX ORDER — ACETAMINOPHEN 325 MG/1
650 TABLET ORAL EVERY 4 HOURS PRN
Status: DISCONTINUED | OUTPATIENT
Start: 2018-01-01 | End: 2018-01-01 | Stop reason: HOSPADM

## 2018-01-01 RX ORDER — DOXYCYCLINE HYCLATE 100 MG/1
100 CAPSULE ORAL EVERY 12 HOURS SCHEDULED
Status: DISCONTINUED | OUTPATIENT
Start: 2018-01-01 | End: 2018-01-01 | Stop reason: HOSPADM

## 2018-01-01 RX ORDER — 0.9 % SODIUM CHLORIDE 0.9 %
250 INTRAVENOUS SOLUTION INTRAVENOUS ONCE
Status: COMPLETED | OUTPATIENT
Start: 2018-01-01 | End: 2018-01-01

## 2018-01-01 RX ORDER — METHYLPREDNISOLONE SODIUM SUCCINATE 125 MG/2ML
125 INJECTION, POWDER, LYOPHILIZED, FOR SOLUTION INTRAMUSCULAR; INTRAVENOUS ONCE
Status: COMPLETED | OUTPATIENT
Start: 2018-01-01 | End: 2018-01-01

## 2018-01-01 RX ORDER — BUDESONIDE 0.5 MG/2ML
0.5 INHALANT ORAL 2 TIMES DAILY
Status: DISCONTINUED | OUTPATIENT
Start: 2018-01-01 | End: 2018-01-01 | Stop reason: HOSPADM

## 2018-01-01 RX ORDER — LEVETIRACETAM 5 MG/ML
500 INJECTION INTRAVASCULAR EVERY 12 HOURS
Status: DISCONTINUED | OUTPATIENT
Start: 2018-01-01 | End: 2018-01-01

## 2018-01-01 RX ORDER — BENZONATATE 100 MG/1
100 CAPSULE ORAL EVERY 4 HOURS PRN
Status: DISCONTINUED | OUTPATIENT
Start: 2018-01-01 | End: 2018-01-01 | Stop reason: HOSPADM

## 2018-01-01 RX ORDER — WARFARIN SODIUM 5 MG/1
5 TABLET ORAL
Status: DISCONTINUED | OUTPATIENT
Start: 2018-01-01 | End: 2018-01-01 | Stop reason: SDUPTHER

## 2018-01-01 RX ORDER — METHYLPREDNISOLONE SODIUM SUCCINATE 40 MG/ML
40 INJECTION, POWDER, LYOPHILIZED, FOR SOLUTION INTRAMUSCULAR; INTRAVENOUS EVERY 6 HOURS
Status: DISCONTINUED | OUTPATIENT
Start: 2018-01-01 | End: 2018-01-01

## 2018-01-01 RX ORDER — MULTIVITAMIN WITH FOLIC ACID 400 MCG
1 TABLET ORAL DAILY
Status: DISCONTINUED | OUTPATIENT
Start: 2018-01-01 | End: 2018-01-01 | Stop reason: HOSPADM

## 2018-01-01 RX ORDER — WARFARIN SODIUM 5 MG/1
5 TABLET ORAL
Status: DISCONTINUED | OUTPATIENT
Start: 2018-01-01 | End: 2018-01-01 | Stop reason: HOSPADM

## 2018-01-01 RX ORDER — ACETAMINOPHEN 500 MG
500 TABLET ORAL EVERY 6 HOURS PRN
Status: ON HOLD | COMMUNITY
End: 2019-01-01 | Stop reason: HOSPADM

## 2018-01-01 RX ORDER — LORAZEPAM 0.5 MG/1
0.5 TABLET ORAL DAILY PRN
Status: DISCONTINUED | OUTPATIENT
Start: 2018-01-01 | End: 2018-01-01 | Stop reason: HOSPADM

## 2018-01-01 RX ORDER — LORAZEPAM 0.5 MG/1
0.5 TABLET ORAL NIGHTLY
Qty: 30 TABLET | Refills: 0 | Status: SHIPPED | OUTPATIENT
Start: 2018-01-01 | End: 2018-01-01 | Stop reason: SDUPTHER

## 2018-01-01 RX ORDER — 0.9 % SODIUM CHLORIDE 0.9 %
500 INTRAVENOUS SOLUTION INTRAVENOUS ONCE
Status: COMPLETED | OUTPATIENT
Start: 2018-01-01 | End: 2018-01-01

## 2018-01-01 RX ORDER — LORAZEPAM 0.5 MG/1
0.5 TABLET ORAL DAILY
Qty: 30 TABLET | Refills: 0 | Status: SHIPPED | OUTPATIENT
Start: 2018-01-01 | End: 2019-01-01 | Stop reason: SDUPTHER

## 2018-01-01 RX ORDER — SODIUM CHLORIDE 9 MG/ML
500 INJECTION, SOLUTION INTRAVENOUS ONCE
Status: COMPLETED | OUTPATIENT
Start: 2018-01-01 | End: 2018-01-01

## 2018-01-01 RX ORDER — LISINOPRIL 5 MG/1
2.5 TABLET ORAL DAILY
Status: DISCONTINUED | OUTPATIENT
Start: 2018-01-01 | End: 2018-01-01

## 2018-01-01 RX ORDER — FORMOTEROL FUMARATE 20 UG/2ML
20 SOLUTION RESPIRATORY (INHALATION) 2 TIMES DAILY
Status: DISCONTINUED | OUTPATIENT
Start: 2018-01-01 | End: 2018-01-01 | Stop reason: HOSPADM

## 2018-01-01 RX ORDER — BIOTIN 1 MG
3000 TABLET ORAL DAILY
COMMUNITY
End: 2019-01-01

## 2018-01-01 RX ORDER — SODIUM CHLORIDE 0.9 % (FLUSH) 0.9 %
10 SYRINGE (ML) INJECTION EVERY 12 HOURS SCHEDULED
Status: DISCONTINUED | OUTPATIENT
Start: 2018-01-01 | End: 2018-01-01 | Stop reason: HOSPADM

## 2018-01-01 RX ORDER — LEVETIRACETAM 500 MG/1
500 TABLET ORAL 2 TIMES DAILY
Qty: 12 TABLET | Refills: 0 | Status: ON HOLD | OUTPATIENT
Start: 2018-01-01 | End: 2019-01-01 | Stop reason: CLARIF

## 2018-01-01 RX ORDER — METHYLPREDNISOLONE SODIUM SUCCINATE 40 MG/ML
40 INJECTION, POWDER, LYOPHILIZED, FOR SOLUTION INTRAMUSCULAR; INTRAVENOUS EVERY 12 HOURS
Status: COMPLETED | OUTPATIENT
Start: 2018-01-01 | End: 2018-01-01

## 2018-01-01 RX ORDER — HYDROCHLOROTHIAZIDE 25 MG/1
25 TABLET ORAL DAILY
COMMUNITY
End: 2018-01-01 | Stop reason: SDUPTHER

## 2018-01-01 RX ORDER — ONDANSETRON 2 MG/ML
4 INJECTION INTRAMUSCULAR; INTRAVENOUS EVERY 6 HOURS PRN
Status: DISCONTINUED | OUTPATIENT
Start: 2018-01-01 | End: 2018-01-01 | Stop reason: HOSPADM

## 2018-01-01 RX ORDER — LORAZEPAM 0.5 MG/1
0.5 TABLET ORAL NIGHTLY
Qty: 30 TABLET | Refills: 0 | Status: SHIPPED | OUTPATIENT
Start: 2018-01-01 | End: 2018-01-01

## 2018-01-01 RX ORDER — ASPIRIN 81 MG/1
81 TABLET, CHEWABLE ORAL DAILY
Status: DISCONTINUED | OUTPATIENT
Start: 2018-01-01 | End: 2018-01-01 | Stop reason: HOSPADM

## 2018-01-01 RX ORDER — ALBUTEROL SULFATE 0.63 MG/3ML
0.63 SOLUTION RESPIRATORY (INHALATION) 3 TIMES DAILY
Status: DISCONTINUED | OUTPATIENT
Start: 2018-01-01 | End: 2018-01-01

## 2018-01-01 RX ORDER — WARFARIN SODIUM 2.5 MG/1
2.5 TABLET ORAL DAILY
Status: DISCONTINUED | OUTPATIENT
Start: 2018-01-01 | End: 2018-01-01

## 2018-01-01 RX ORDER — WARFARIN SODIUM 2.5 MG/1
2.5 TABLET ORAL
Status: DISCONTINUED | OUTPATIENT
Start: 2018-01-01 | End: 2018-01-01 | Stop reason: HOSPADM

## 2018-01-01 RX ORDER — METOPROLOL TARTRATE 50 MG/1
50 TABLET, FILM COATED ORAL 2 TIMES DAILY
Qty: 180 TABLET | Refills: 3 | Status: ON HOLD | OUTPATIENT
Start: 2018-01-01 | End: 2019-01-01 | Stop reason: HOSPADM

## 2018-01-01 RX ORDER — ALBUTEROL SULFATE 0.63 MG/3ML
0.63 SOLUTION RESPIRATORY (INHALATION)
Status: DISCONTINUED | OUTPATIENT
Start: 2018-01-01 | End: 2018-01-01

## 2018-01-01 RX ORDER — LORAZEPAM 0.5 MG/1
0.5 TABLET ORAL NIGHTLY
Qty: 30 TABLET | Refills: 0 | Status: CANCELLED | OUTPATIENT
Start: 2018-01-01 | End: 2019-01-01

## 2018-01-01 RX ORDER — WARFARIN SODIUM 2.5 MG/1
TABLET ORAL
Qty: 40 TABLET | Refills: 11 | Status: ON HOLD | OUTPATIENT
Start: 2018-01-01 | End: 2018-01-01 | Stop reason: HOSPADM

## 2018-01-01 RX ORDER — ASCORBIC ACID 500 MG
500 TABLET ORAL DAILY
Status: DISCONTINUED | OUTPATIENT
Start: 2018-01-01 | End: 2018-01-01 | Stop reason: HOSPADM

## 2018-01-01 RX ORDER — LEVOTHYROXINE SODIUM 0.07 MG/1
TABLET ORAL
Qty: 90 TABLET | Refills: 1 | Status: SHIPPED | OUTPATIENT
Start: 2018-01-01 | End: 2019-01-01

## 2018-01-01 RX ORDER — BENZONATATE 100 MG/1
100 CAPSULE ORAL EVERY 4 HOURS PRN
Qty: 30 CAPSULE | Refills: 1 | Status: SHIPPED | OUTPATIENT
Start: 2018-01-01 | End: 2018-01-01

## 2018-01-01 RX ADMIN — LORAZEPAM 0.5 MG: 0.5 TABLET ORAL at 00:34

## 2018-01-01 RX ADMIN — PREDNISONE 30 MG: 10 TABLET ORAL at 08:26

## 2018-01-01 RX ADMIN — Medication 10 ML: at 16:19

## 2018-01-01 RX ADMIN — MULTIVITAMIN TABLET 1 TABLET: TABLET at 16:56

## 2018-01-01 RX ADMIN — LORAZEPAM 0.5 MG: 0.5 TABLET ORAL at 00:23

## 2018-01-01 RX ADMIN — Medication 10 ML: at 20:48

## 2018-01-01 RX ADMIN — METHYLPREDNISOLONE SODIUM SUCCINATE 40 MG: 40 INJECTION, POWDER, LYOPHILIZED, FOR SOLUTION INTRAMUSCULAR; INTRAVENOUS at 05:30

## 2018-01-01 RX ADMIN — LORAZEPAM 0.5 MG: 0.5 TABLET ORAL at 23:45

## 2018-01-01 RX ADMIN — BUDESONIDE 500 MCG: 0.5 SUSPENSION RESPIRATORY (INHALATION) at 22:34

## 2018-01-01 RX ADMIN — METOPROLOL TARTRATE 50 MG: 50 TABLET ORAL at 09:23

## 2018-01-01 RX ADMIN — ATORVASTATIN CALCIUM 10 MG: 10 TABLET, FILM COATED ORAL at 08:47

## 2018-01-01 RX ADMIN — CEFEPIME HYDROCHLORIDE 1 G: 1 INJECTION, POWDER, FOR SOLUTION INTRAMUSCULAR; INTRAVENOUS at 16:19

## 2018-01-01 RX ADMIN — DOXYCYCLINE HYCLATE 100 MG: 100 CAPSULE, GELATIN COATED ORAL at 08:34

## 2018-01-01 RX ADMIN — BUDESONIDE 500 MCG: 0.5 SUSPENSION RESPIRATORY (INHALATION) at 10:58

## 2018-01-01 RX ADMIN — METOPROLOL TARTRATE 50 MG: 50 TABLET ORAL at 20:25

## 2018-01-01 RX ADMIN — LISINOPRIL 2.5 MG: 5 TABLET ORAL at 08:47

## 2018-01-01 RX ADMIN — ATORVASTATIN CALCIUM 10 MG: 10 TABLET, FILM COATED ORAL at 09:24

## 2018-01-01 RX ADMIN — Medication 10 ML: at 15:01

## 2018-01-01 RX ADMIN — METOPROLOL TARTRATE 50 MG: 50 TABLET ORAL at 20:47

## 2018-01-01 RX ADMIN — WATER 1 G: 1 INJECTION INTRAMUSCULAR; INTRAVENOUS; SUBCUTANEOUS at 14:10

## 2018-01-01 RX ADMIN — DOXYCYCLINE 100 MG: 100 INJECTION, POWDER, LYOPHILIZED, FOR SOLUTION INTRAVENOUS at 10:47

## 2018-01-01 RX ADMIN — LEVOTHYROXINE SODIUM 75 MCG: 75 TABLET ORAL at 09:24

## 2018-01-01 RX ADMIN — METOPROLOL TARTRATE 50 MG: 50 TABLET ORAL at 20:30

## 2018-01-01 RX ADMIN — ALBUTEROL SULFATE 0.63 MG: 0.63 SOLUTION RESPIRATORY (INHALATION) at 16:45

## 2018-01-01 RX ADMIN — Medication 10 ML: at 08:52

## 2018-01-01 RX ADMIN — LEVOTHYROXINE SODIUM 75 MCG: 75 TABLET ORAL at 08:52

## 2018-01-01 RX ADMIN — Medication 500 MG: at 08:34

## 2018-01-01 RX ADMIN — HYDROCHLOROTHIAZIDE 25 MG: 25 TABLET ORAL at 10:47

## 2018-01-01 RX ADMIN — SODIUM CHLORIDE 250 ML: 9 INJECTION, SOLUTION INTRAVENOUS at 03:26

## 2018-01-01 RX ADMIN — METOPROLOL TARTRATE 50 MG: 50 TABLET ORAL at 10:46

## 2018-01-01 RX ADMIN — LISINOPRIL 2.5 MG: 5 TABLET ORAL at 09:24

## 2018-01-01 RX ADMIN — WARFARIN SODIUM 2.5 MG: 2.5 TABLET ORAL at 17:57

## 2018-01-01 RX ADMIN — Medication 10 ML: at 10:48

## 2018-01-01 RX ADMIN — WARFARIN SODIUM 2.5 MG: 2.5 TABLET ORAL at 18:00

## 2018-01-01 RX ADMIN — LEVETIRACETAM 500 MG: 5 INJECTION INTRAVENOUS at 16:38

## 2018-01-01 RX ADMIN — LISINOPRIL 2.5 MG: 5 TABLET ORAL at 08:27

## 2018-01-01 RX ADMIN — BUDESONIDE 500 MCG: 0.5 SUSPENSION RESPIRATORY (INHALATION) at 20:40

## 2018-01-01 RX ADMIN — Medication 500 MG: at 08:47

## 2018-01-01 RX ADMIN — WARFARIN SODIUM 2.5 MG: 2.5 TABLET ORAL at 17:32

## 2018-01-01 RX ADMIN — DOXYCYCLINE HYCLATE 100 MG: 100 CAPSULE, GELATIN COATED ORAL at 20:47

## 2018-01-01 RX ADMIN — ASPIRIN 81 MG CHEWABLE TABLET 81 MG: 81 TABLET CHEWABLE at 08:34

## 2018-01-01 RX ADMIN — Medication 10 ML: at 08:44

## 2018-01-01 RX ADMIN — FORMOTEROL FUMARATE DIHYDRATE 20 MCG: 20 SOLUTION RESPIRATORY (INHALATION) at 20:24

## 2018-01-01 RX ADMIN — METHYLPREDNISOLONE SODIUM SUCCINATE 40 MG: 40 INJECTION, POWDER, LYOPHILIZED, FOR SOLUTION INTRAMUSCULAR; INTRAVENOUS at 08:35

## 2018-01-01 RX ADMIN — LISINOPRIL 2.5 MG: 5 TABLET ORAL at 16:55

## 2018-01-01 RX ADMIN — PREDNISONE 30 MG: 10 TABLET ORAL at 08:52

## 2018-01-01 RX ADMIN — ATORVASTATIN CALCIUM 10 MG: 10 TABLET, FILM COATED ORAL at 10:47

## 2018-01-01 RX ADMIN — DOXYCYCLINE HYCLATE 100 MG: 100 CAPSULE, GELATIN COATED ORAL at 09:24

## 2018-01-01 RX ADMIN — FORMOTEROL FUMARATE DIHYDRATE 20 MCG: 20 SOLUTION RESPIRATORY (INHALATION) at 19:43

## 2018-01-01 RX ADMIN — CEFEPIME HYDROCHLORIDE 1 G: 1 INJECTION, POWDER, FOR SOLUTION INTRAMUSCULAR; INTRAVENOUS at 16:38

## 2018-01-01 RX ADMIN — PREDNISONE 30 MG: 10 TABLET ORAL at 08:29

## 2018-01-01 RX ADMIN — HYDROCHLOROTHIAZIDE 25 MG: 25 TABLET ORAL at 08:44

## 2018-01-01 RX ADMIN — LISINOPRIL 2.5 MG: 5 TABLET ORAL at 08:29

## 2018-01-01 RX ADMIN — Medication 10 ML: at 09:09

## 2018-01-01 RX ADMIN — Medication 500 MG: at 10:47

## 2018-01-01 RX ADMIN — DOXYCYCLINE HYCLATE 100 MG: 100 CAPSULE, GELATIN COATED ORAL at 08:47

## 2018-01-01 RX ADMIN — METHYLPREDNISOLONE SODIUM SUCCINATE 40 MG: 40 INJECTION, POWDER, LYOPHILIZED, FOR SOLUTION INTRAMUSCULAR; INTRAVENOUS at 03:21

## 2018-01-01 RX ADMIN — METOPROLOL TARTRATE 50 MG: 50 TABLET ORAL at 20:32

## 2018-01-01 RX ADMIN — ATORVASTATIN CALCIUM 10 MG: 10 TABLET, FILM COATED ORAL at 08:34

## 2018-01-01 RX ADMIN — CALCIUM CARBONATE-VITAMIN D TAB 500 MG-200 UNIT 1 TABLET: 500-200 TAB at 08:26

## 2018-01-01 RX ADMIN — SODIUM CHLORIDE 500 ML: 9 INJECTION, SOLUTION INTRAVENOUS at 15:52

## 2018-01-01 RX ADMIN — Medication 10 ML: at 03:21

## 2018-01-01 RX ADMIN — LEVOTHYROXINE SODIUM 75 MCG: 75 TABLET ORAL at 08:47

## 2018-01-01 RX ADMIN — BUDESONIDE 500 MCG: 0.5 SUSPENSION RESPIRATORY (INHALATION) at 09:18

## 2018-01-01 RX ADMIN — ALBUTEROL SULFATE 0.63 MG: 0.63 SOLUTION RESPIRATORY (INHALATION) at 12:43

## 2018-01-01 RX ADMIN — MULTIVITAMIN TABLET 1 TABLET: TABLET at 08:29

## 2018-01-01 RX ADMIN — DOXYCYCLINE HYCLATE 100 MG: 100 CAPSULE, GELATIN COATED ORAL at 20:24

## 2018-01-01 RX ADMIN — CALCIUM CARBONATE-VITAMIN D TAB 500 MG-200 UNIT 1 TABLET: 500-200 TAB at 08:52

## 2018-01-01 RX ADMIN — METHYLPREDNISOLONE SODIUM SUCCINATE 40 MG: 40 INJECTION, POWDER, LYOPHILIZED, FOR SOLUTION INTRAMUSCULAR; INTRAVENOUS at 14:10

## 2018-01-01 RX ADMIN — ASPIRIN 81 MG CHEWABLE TABLET 81 MG: 81 TABLET CHEWABLE at 08:26

## 2018-01-01 RX ADMIN — ALBUTEROL SULFATE 0.63 MG: 0.63 SOLUTION RESPIRATORY (INHALATION) at 13:13

## 2018-01-01 RX ADMIN — CEFEPIME HYDROCHLORIDE 1 G: 1 INJECTION, POWDER, FOR SOLUTION INTRAMUSCULAR; INTRAVENOUS at 03:17

## 2018-01-01 RX ADMIN — Medication 500 MG: at 08:52

## 2018-01-01 RX ADMIN — Medication 500 MG: at 09:23

## 2018-01-01 RX ADMIN — WARFARIN SODIUM 2.5 MG: 2.5 TABLET ORAL at 18:04

## 2018-01-01 RX ADMIN — BUDESONIDE 500 MCG: 0.5 SUSPENSION RESPIRATORY (INHALATION) at 08:35

## 2018-01-01 RX ADMIN — BUDESONIDE 500 MCG: 0.5 SUSPENSION RESPIRATORY (INHALATION) at 09:47

## 2018-01-01 RX ADMIN — LISINOPRIL 2.5 MG: 5 TABLET ORAL at 10:46

## 2018-01-01 RX ADMIN — DOXYCYCLINE HYCLATE 100 MG: 100 CAPSULE, GELATIN COATED ORAL at 20:32

## 2018-01-01 RX ADMIN — METHYLPREDNISOLONE SODIUM SUCCINATE 40 MG: 40 INJECTION, POWDER, LYOPHILIZED, FOR SOLUTION INTRAMUSCULAR; INTRAVENOUS at 20:24

## 2018-01-01 RX ADMIN — BUDESONIDE 500 MCG: 0.5 SUSPENSION RESPIRATORY (INHALATION) at 08:37

## 2018-01-01 RX ADMIN — LORAZEPAM 0.5 MG: 0.5 TABLET ORAL at 23:52

## 2018-01-01 RX ADMIN — ACETAMINOPHEN 650 MG: 325 TABLET, FILM COATED ORAL at 05:00

## 2018-01-01 RX ADMIN — HYDROCHLOROTHIAZIDE 25 MG: 25 TABLET ORAL at 08:27

## 2018-01-01 RX ADMIN — METOPROLOL TARTRATE 50 MG: 50 TABLET ORAL at 08:47

## 2018-01-01 RX ADMIN — METHYLPREDNISOLONE SODIUM SUCCINATE 40 MG: 40 INJECTION, POWDER, FOR SOLUTION INTRAMUSCULAR; INTRAVENOUS at 15:28

## 2018-01-01 RX ADMIN — Medication 10 ML: at 17:00

## 2018-01-01 RX ADMIN — FORMOTEROL FUMARATE DIHYDRATE 20 MCG: 20 SOLUTION RESPIRATORY (INHALATION) at 09:16

## 2018-01-01 RX ADMIN — Medication 500 MG: at 08:27

## 2018-01-01 RX ADMIN — ALBUTEROL SULFATE 0.63 MG: 0.63 SOLUTION RESPIRATORY (INHALATION) at 16:25

## 2018-01-01 RX ADMIN — ATORVASTATIN CALCIUM 10 MG: 10 TABLET, FILM COATED ORAL at 08:28

## 2018-01-01 RX ADMIN — ASPIRIN 81 MG CHEWABLE TABLET 81 MG: 81 TABLET CHEWABLE at 08:51

## 2018-01-01 RX ADMIN — METHYLPREDNISOLONE SODIUM SUCCINATE 40 MG: 40 INJECTION, POWDER, LYOPHILIZED, FOR SOLUTION INTRAMUSCULAR; INTRAVENOUS at 09:24

## 2018-01-01 RX ADMIN — BENZONATATE 100 MG: 100 CAPSULE ORAL at 20:47

## 2018-01-01 RX ADMIN — ATORVASTATIN CALCIUM 10 MG: 10 TABLET, FILM COATED ORAL at 08:26

## 2018-01-01 RX ADMIN — BUDESONIDE 500 MCG: 0.5 SUSPENSION RESPIRATORY (INHALATION) at 19:57

## 2018-01-01 RX ADMIN — CEFEPIME HYDROCHLORIDE 1 G: 1 INJECTION, POWDER, FOR SOLUTION INTRAMUSCULAR; INTRAVENOUS at 04:01

## 2018-01-01 RX ADMIN — METOPROLOL TARTRATE 50 MG: 50 TABLET ORAL at 08:52

## 2018-01-01 RX ADMIN — Medication 10 ML: at 08:28

## 2018-01-01 RX ADMIN — Medication 500 MG: at 08:29

## 2018-01-01 RX ADMIN — BUDESONIDE 500 MCG: 0.5 SUSPENSION RESPIRATORY (INHALATION) at 10:46

## 2018-01-01 RX ADMIN — WARFARIN SODIUM 2.5 MG: 2.5 TABLET ORAL at 18:31

## 2018-01-01 RX ADMIN — SODIUM CHLORIDE 500 ML: 9 INJECTION, SOLUTION INTRAVENOUS at 12:00

## 2018-01-01 RX ADMIN — Medication 10 ML: at 13:24

## 2018-01-01 RX ADMIN — METHYLPREDNISOLONE SODIUM SUCCINATE 40 MG: 40 INJECTION, POWDER, FOR SOLUTION INTRAMUSCULAR; INTRAVENOUS at 03:17

## 2018-01-01 RX ADMIN — METOPROLOL TARTRATE 50 MG: 50 TABLET ORAL at 08:29

## 2018-01-01 RX ADMIN — LEVOTHYROXINE SODIUM 75 MCG: 75 TABLET ORAL at 08:27

## 2018-01-01 RX ADMIN — BUDESONIDE 500 MCG: 0.5 SUSPENSION RESPIRATORY (INHALATION) at 20:43

## 2018-01-01 RX ADMIN — FORMOTEROL FUMARATE DIHYDRATE 20 MCG: 20 SOLUTION RESPIRATORY (INHALATION) at 22:32

## 2018-01-01 RX ADMIN — FORMOTEROL FUMARATE DIHYDRATE 20 MCG: 20 SOLUTION RESPIRATORY (INHALATION) at 20:43

## 2018-01-01 RX ADMIN — CEFEPIME HYDROCHLORIDE 1 G: 1 INJECTION, POWDER, FOR SOLUTION INTRAMUSCULAR; INTRAVENOUS at 17:32

## 2018-01-01 RX ADMIN — SODIUM CHLORIDE: 9 INJECTION, SOLUTION INTRAVENOUS at 09:09

## 2018-01-01 RX ADMIN — LEVOTHYROXINE SODIUM 75 MCG: 75 TABLET ORAL at 10:47

## 2018-01-01 RX ADMIN — METHYLPREDNISOLONE SODIUM SUCCINATE 40 MG: 40 INJECTION, POWDER, LYOPHILIZED, FOR SOLUTION INTRAMUSCULAR; INTRAVENOUS at 17:00

## 2018-01-01 RX ADMIN — LISINOPRIL 2.5 MG: 5 TABLET ORAL at 08:51

## 2018-01-01 RX ADMIN — LORAZEPAM 0.5 MG: 0.5 TABLET ORAL at 00:09

## 2018-01-01 RX ADMIN — CALCIUM CARBONATE-VITAMIN D TAB 500 MG-200 UNIT 1 TABLET: 500-200 TAB at 08:47

## 2018-01-01 RX ADMIN — FORMOTEROL FUMARATE DIHYDRATE 20 MCG: 20 SOLUTION RESPIRATORY (INHALATION) at 09:49

## 2018-01-01 RX ADMIN — Medication 10 ML: at 21:22

## 2018-01-01 RX ADMIN — BUDESONIDE 500 MCG: 0.5 SUSPENSION RESPIRATORY (INHALATION) at 20:23

## 2018-01-01 RX ADMIN — METOPROLOL TARTRATE 50 MG: 50 TABLET ORAL at 08:27

## 2018-01-01 RX ADMIN — DOXYCYCLINE HYCLATE 100 MG: 100 CAPSULE, GELATIN COATED ORAL at 20:25

## 2018-01-01 RX ADMIN — DOXYCYCLINE HYCLATE 100 MG: 100 CAPSULE, GELATIN COATED ORAL at 21:49

## 2018-01-01 RX ADMIN — ASPIRIN 81 MG CHEWABLE TABLET 81 MG: 81 TABLET CHEWABLE at 08:47

## 2018-01-01 RX ADMIN — LEVETIRACETAM 500 MG: 500 TABLET, FILM COATED ORAL at 08:44

## 2018-01-01 RX ADMIN — METHYLPREDNISOLONE SODIUM SUCCINATE 40 MG: 40 INJECTION, POWDER, FOR SOLUTION INTRAMUSCULAR; INTRAVENOUS at 15:00

## 2018-01-01 RX ADMIN — ASPIRIN 81 MG CHEWABLE TABLET 81 MG: 81 TABLET CHEWABLE at 09:23

## 2018-01-01 RX ADMIN — HYDROCHLOROTHIAZIDE 25 MG: 25 TABLET ORAL at 09:24

## 2018-01-01 RX ADMIN — METOPROLOL TARTRATE 50 MG: 50 TABLET ORAL at 21:22

## 2018-01-01 RX ADMIN — ATORVASTATIN CALCIUM 10 MG: 10 TABLET, FILM COATED ORAL at 16:56

## 2018-01-01 RX ADMIN — HYDROCHLOROTHIAZIDE 25 MG: 25 TABLET ORAL at 16:54

## 2018-01-01 RX ADMIN — HYDROCHLOROTHIAZIDE 25 MG: 25 TABLET ORAL at 08:29

## 2018-01-01 RX ADMIN — ALBUTEROL SULFATE 0.63 MG: 0.63 SOLUTION RESPIRATORY (INHALATION) at 19:57

## 2018-01-01 RX ADMIN — MULTIVITAMIN TABLET 1 TABLET: TABLET at 08:27

## 2018-01-01 RX ADMIN — MULTIVITAMIN TABLET 1 TABLET: TABLET at 08:47

## 2018-01-01 RX ADMIN — FORMOTEROL FUMARATE DIHYDRATE 20 MCG: 20 SOLUTION RESPIRATORY (INHALATION) at 20:40

## 2018-01-01 RX ADMIN — DOXYCYCLINE HYCLATE 100 MG: 100 CAPSULE, GELATIN COATED ORAL at 10:47

## 2018-01-01 RX ADMIN — WARFARIN SODIUM 5 MG: 5 TABLET ORAL at 18:20

## 2018-01-01 RX ADMIN — METOPROLOL TARTRATE 50 MG: 50 TABLET ORAL at 08:44

## 2018-01-01 RX ADMIN — MULTIVITAMIN TABLET 1 TABLET: TABLET at 10:47

## 2018-01-01 RX ADMIN — LEVOTHYROXINE SODIUM 75 MCG: 75 TABLET ORAL at 08:29

## 2018-01-01 RX ADMIN — METHYLPREDNISOLONE SODIUM SUCCINATE 40 MG: 40 INJECTION, POWDER, LYOPHILIZED, FOR SOLUTION INTRAMUSCULAR; INTRAVENOUS at 20:32

## 2018-01-01 RX ADMIN — LEVOTHYROXINE SODIUM 75 MCG: 75 TABLET ORAL at 16:56

## 2018-01-01 RX ADMIN — FORMOTEROL FUMARATE DIHYDRATE 20 MCG: 20 SOLUTION RESPIRATORY (INHALATION) at 08:57

## 2018-01-01 RX ADMIN — CEFEPIME HYDROCHLORIDE 1 G: 1 INJECTION, POWDER, FOR SOLUTION INTRAMUSCULAR; INTRAVENOUS at 04:03

## 2018-01-01 RX ADMIN — METOPROLOL TARTRATE 50 MG: 50 TABLET ORAL at 08:34

## 2018-01-01 RX ADMIN — FORMOTEROL FUMARATE DIHYDRATE 20 MCG: 20 SOLUTION RESPIRATORY (INHALATION) at 10:58

## 2018-01-01 RX ADMIN — DOXYCYCLINE HYCLATE 100 MG: 100 CAPSULE, GELATIN COATED ORAL at 08:27

## 2018-01-01 RX ADMIN — Medication 500 MG: at 16:56

## 2018-01-01 RX ADMIN — CALCIUM CARBONATE-VITAMIN D TAB 500 MG-200 UNIT 1 TABLET: 500-200 TAB at 09:28

## 2018-01-01 RX ADMIN — CALCIUM CARBONATE-VITAMIN D TAB 500 MG-200 UNIT 1 TABLET: 500-200 TAB at 08:29

## 2018-01-01 RX ADMIN — Medication 10 ML: at 20:32

## 2018-01-01 RX ADMIN — LORAZEPAM 0.5 MG: 0.5 TABLET ORAL at 23:13

## 2018-01-01 RX ADMIN — LEVETIRACETAM 500 MG: 5 INJECTION INTRAVENOUS at 03:46

## 2018-01-01 RX ADMIN — HYDROCHLOROTHIAZIDE 25 MG: 25 TABLET ORAL at 08:34

## 2018-01-01 RX ADMIN — IPRATROPIUM BROMIDE AND ALBUTEROL SULFATE 3 AMPULE: 2.5; .5 SOLUTION RESPIRATORY (INHALATION) at 10:36

## 2018-01-01 RX ADMIN — LISINOPRIL 2.5 MG: 5 TABLET ORAL at 08:34

## 2018-01-01 RX ADMIN — CEFEPIME HYDROCHLORIDE 1 G: 1 INJECTION, POWDER, FOR SOLUTION INTRAMUSCULAR; INTRAVENOUS at 15:52

## 2018-01-01 RX ADMIN — DOXYCYCLINE HYCLATE 100 MG: 100 CAPSULE, GELATIN COATED ORAL at 20:48

## 2018-01-01 RX ADMIN — HYDROCHLOROTHIAZIDE 25 MG: 25 TABLET ORAL at 08:52

## 2018-01-01 RX ADMIN — PHYTONADIONE 10 MG: 10 INJECTION, EMULSION INTRAMUSCULAR; INTRAVENOUS; SUBCUTANEOUS at 02:36

## 2018-01-01 RX ADMIN — MULTIVITAMIN TABLET 1 TABLET: TABLET at 09:24

## 2018-01-01 RX ADMIN — CALCIUM CARBONATE-VITAMIN D TAB 500 MG-200 UNIT 1 TABLET: 500-200 TAB at 16:56

## 2018-01-01 RX ADMIN — LEVOTHYROXINE SODIUM 75 MCG: 75 TABLET ORAL at 05:03

## 2018-01-01 RX ADMIN — METHYLPREDNISOLONE SODIUM SUCCINATE 125 MG: 125 INJECTION, POWDER, LYOPHILIZED, FOR SOLUTION INTRAMUSCULAR; INTRAVENOUS at 10:32

## 2018-01-01 RX ADMIN — Medication 10 ML: at 09:24

## 2018-01-01 RX ADMIN — MULTIVITAMIN TABLET 1 TABLET: TABLET at 08:34

## 2018-01-01 RX ADMIN — ATORVASTATIN CALCIUM 10 MG: 10 TABLET, FILM COATED ORAL at 08:52

## 2018-01-01 RX ADMIN — METOPROLOL TARTRATE 50 MG: 50 TABLET ORAL at 20:24

## 2018-01-01 RX ADMIN — METOPROLOL TARTRATE 50 MG: 50 TABLET ORAL at 21:49

## 2018-01-01 RX ADMIN — ASPIRIN 81 MG CHEWABLE TABLET 81 MG: 81 TABLET CHEWABLE at 16:56

## 2018-01-01 RX ADMIN — CALCIUM CARBONATE-VITAMIN D TAB 500 MG-200 UNIT 1 TABLET: 500-200 TAB at 08:34

## 2018-01-01 RX ADMIN — BUDESONIDE 500 MCG: 0.5 SUSPENSION RESPIRATORY (INHALATION) at 08:57

## 2018-01-01 RX ADMIN — ASPIRIN 81 MG CHEWABLE TABLET 81 MG: 81 TABLET CHEWABLE at 10:46

## 2018-01-01 RX ADMIN — ALBUTEROL SULFATE 0.63 MG: 0.63 SOLUTION RESPIRATORY (INHALATION) at 09:48

## 2018-01-01 RX ADMIN — FORMOTEROL FUMARATE DIHYDRATE 20 MCG: 20 SOLUTION RESPIRATORY (INHALATION) at 19:57

## 2018-01-01 RX ADMIN — Medication 10 ML: at 22:11

## 2018-01-01 RX ADMIN — HYDROCHLOROTHIAZIDE 25 MG: 25 TABLET ORAL at 08:47

## 2018-01-01 RX ADMIN — METHYLPREDNISOLONE SODIUM SUCCINATE 40 MG: 40 INJECTION, POWDER, LYOPHILIZED, FOR SOLUTION INTRAMUSCULAR; INTRAVENOUS at 02:19

## 2018-01-01 RX ADMIN — Medication 10 ML: at 14:10

## 2018-01-01 RX ADMIN — CEFEPIME HYDROCHLORIDE 1 G: 1 INJECTION, POWDER, FOR SOLUTION INTRAMUSCULAR; INTRAVENOUS at 04:10

## 2018-01-01 RX ADMIN — Medication 10 ML: at 21:50

## 2018-01-01 RX ADMIN — Medication 10 ML: at 20:24

## 2018-01-01 RX ADMIN — FORMOTEROL FUMARATE DIHYDRATE 20 MCG: 20 SOLUTION RESPIRATORY (INHALATION) at 10:45

## 2018-01-01 RX ADMIN — ACETAMINOPHEN 650 MG: 325 TABLET, FILM COATED ORAL at 16:38

## 2018-01-01 RX ADMIN — FORMOTEROL FUMARATE DIHYDRATE 20 MCG: 20 SOLUTION RESPIRATORY (INHALATION) at 08:35

## 2018-01-01 RX ADMIN — WATER 1 G: 1 INJECTION INTRAMUSCULAR; INTRAVENOUS; SUBCUTANEOUS at 13:24

## 2018-01-01 RX ADMIN — DOXYCYCLINE HYCLATE 100 MG: 100 CAPSULE, GELATIN COATED ORAL at 08:29

## 2018-01-01 RX ADMIN — MULTIVITAMIN TABLET 1 TABLET: TABLET at 08:52

## 2018-01-01 RX ADMIN — FORMOTEROL FUMARATE DIHYDRATE 20 MCG: 20 SOLUTION RESPIRATORY (INHALATION) at 08:33

## 2018-01-01 RX ADMIN — METHYLPREDNISOLONE SODIUM SUCCINATE 40 MG: 40 INJECTION, POWDER, LYOPHILIZED, FOR SOLUTION INTRAMUSCULAR; INTRAVENOUS at 16:56

## 2018-01-01 RX ADMIN — METHYLPREDNISOLONE SODIUM SUCCINATE 40 MG: 40 INJECTION, POWDER, LYOPHILIZED, FOR SOLUTION INTRAMUSCULAR; INTRAVENOUS at 21:48

## 2018-01-01 RX ADMIN — METOPROLOL TARTRATE 50 MG: 50 TABLET ORAL at 16:54

## 2018-01-01 RX ADMIN — LEVOTHYROXINE SODIUM 75 MCG: 75 TABLET ORAL at 08:34

## 2018-01-01 RX ADMIN — CALCIUM CARBONATE-VITAMIN D TAB 500 MG-200 UNIT 1 TABLET: 500-200 TAB at 10:47

## 2018-01-01 RX ADMIN — ALBUTEROL SULFATE 0.63 MG: 0.63 SOLUTION RESPIRATORY (INHALATION) at 20:23

## 2018-01-01 RX ADMIN — ASPIRIN 81 MG CHEWABLE TABLET 81 MG: 81 TABLET CHEWABLE at 08:28

## 2018-01-01 RX ADMIN — Medication 10 ML: at 21:49

## 2018-01-01 RX ADMIN — BUDESONIDE 500 MCG: 0.5 SUSPENSION RESPIRATORY (INHALATION) at 19:43

## 2018-01-01 RX ADMIN — FORMOTEROL FUMARATE DIHYDRATE 20 MCG: 20 SOLUTION RESPIRATORY (INHALATION) at 22:20

## 2018-01-01 RX ADMIN — DOXYCYCLINE HYCLATE 100 MG: 100 CAPSULE, GELATIN COATED ORAL at 08:52

## 2018-01-01 RX ADMIN — LORAZEPAM 0.5 MG: 0.5 TABLET ORAL at 23:36

## 2018-01-01 RX ADMIN — Medication 10 ML: at 08:47

## 2018-01-01 RX ADMIN — Medication 10 ML: at 21:30

## 2018-01-01 RX ADMIN — Medication 10 ML: at 08:29

## 2018-01-01 RX ADMIN — Medication 10 ML: at 20:25

## 2018-01-01 RX ADMIN — BUDESONIDE 500 MCG: 0.5 SUSPENSION RESPIRATORY (INHALATION) at 22:20

## 2018-01-01 RX ADMIN — Medication 10 ML: at 08:35

## 2018-01-01 RX ADMIN — ATORVASTATIN CALCIUM 10 MG: 10 TABLET, FILM COATED ORAL at 08:44

## 2018-01-01 RX ADMIN — DOXYCYCLINE HYCLATE 100 MG: 100 CAPSULE, GELATIN COATED ORAL at 21:22

## 2018-01-01 RX ADMIN — WATER 1 G: 1 INJECTION INTRAMUSCULAR; INTRAVENOUS; SUBCUTANEOUS at 16:56

## 2018-01-01 ASSESSMENT — PAIN SCALES - GENERAL
PAINLEVEL_OUTOF10: 0
PAINLEVEL_OUTOF10: 3
PAINLEVEL_OUTOF10: 0
PAINLEVEL_OUTOF10: 5

## 2018-01-01 ASSESSMENT — ENCOUNTER SYMPTOMS
SPUTUM PRODUCTION: 1
ABDOMINAL PAIN: 0
VOMITING: 0
WHEEZING: 1
BACK PAIN: 0
SORE THROAT: 0
COUGH: 1
SHORTNESS OF BREATH: 1

## 2018-01-01 ASSESSMENT — PAIN DESCRIPTION - DESCRIPTORS: DESCRIPTORS: HEADACHE

## 2018-01-01 ASSESSMENT — PAIN DESCRIPTION - FREQUENCY: FREQUENCY: INTERMITTENT

## 2018-01-01 ASSESSMENT — PAIN DESCRIPTION - PAIN TYPE: TYPE: ACUTE PAIN

## 2018-01-01 ASSESSMENT — PAIN DESCRIPTION - LOCATION: LOCATION: HEAD

## 2018-03-24 DIAGNOSIS — I48.0 PAROXYSMAL ATRIAL FIBRILLATION (HCC): ICD-10-CM

## 2018-03-26 ENCOUNTER — HOSPITAL ENCOUNTER (OUTPATIENT)
Dept: PHARMACY | Age: 83
Setting detail: THERAPIES SERIES
Discharge: HOME | End: 2018-03-26
Payer: MEDICARE

## 2018-03-26 VITALS
BODY MASS INDEX: 17.81 KG/M2 | WEIGHT: 91.2 LBS | DIASTOLIC BLOOD PRESSURE: 72 MMHG | HEART RATE: 71 BPM | SYSTOLIC BLOOD PRESSURE: 151 MMHG

## 2018-03-26 DIAGNOSIS — I48.0 PAROXYSMAL ATRIAL FIBRILLATION (HCC): ICD-10-CM

## 2018-03-26 LAB — INTERNATIONAL NORMALIZATION RATIO, POC: 2

## 2018-03-26 PROCEDURE — 85610 PROTHROMBIN TIME: CPT

## 2018-03-26 PROCEDURE — 99211 OFF/OP EST MAY X REQ PHY/QHP: CPT

## 2018-03-26 RX ORDER — WARFARIN SODIUM 2.5 MG/1
2.5 TABLET ORAL
COMMUNITY
End: 2018-01-01 | Stop reason: SDUPTHER

## 2018-04-04 DIAGNOSIS — F41.9 ANXIETY: ICD-10-CM

## 2018-04-04 RX ORDER — LORAZEPAM 0.5 MG/1
0.5 TABLET ORAL DAILY PRN
Qty: 30 TABLET | Refills: 0 | Status: SHIPPED | OUTPATIENT
Start: 2018-04-04 | End: 2018-05-07 | Stop reason: SDUPTHER

## 2018-04-19 DIAGNOSIS — I10 ESSENTIAL HYPERTENSION: ICD-10-CM

## 2018-04-19 RX ORDER — LISINOPRIL 2.5 MG/1
2.5 TABLET ORAL DAILY
Qty: 90 TABLET | Refills: 1 | Status: ON HOLD | OUTPATIENT
Start: 2018-04-19 | End: 2018-01-01 | Stop reason: HOSPADM

## 2018-04-23 ENCOUNTER — HOSPITAL ENCOUNTER (OUTPATIENT)
Dept: PHARMACY | Age: 83
Setting detail: THERAPIES SERIES
Discharge: HOME | End: 2018-04-23
Payer: MEDICARE

## 2018-04-23 VITALS
DIASTOLIC BLOOD PRESSURE: 84 MMHG | HEART RATE: 76 BPM | BODY MASS INDEX: 18.12 KG/M2 | WEIGHT: 92.8 LBS | SYSTOLIC BLOOD PRESSURE: 158 MMHG

## 2018-04-23 DIAGNOSIS — I48.0 PAROXYSMAL ATRIAL FIBRILLATION (HCC): ICD-10-CM

## 2018-04-23 LAB — INTERNATIONAL NORMALIZATION RATIO, POC: 1.8

## 2018-04-23 PROCEDURE — 99211 OFF/OP EST MAY X REQ PHY/QHP: CPT

## 2018-04-23 PROCEDURE — 85610 PROTHROMBIN TIME: CPT

## 2018-05-07 ENCOUNTER — HOSPITAL ENCOUNTER (OUTPATIENT)
Dept: PHARMACY | Age: 83
Setting detail: THERAPIES SERIES
Discharge: HOME | End: 2018-05-07
Payer: MEDICARE

## 2018-05-07 VITALS
BODY MASS INDEX: 18.55 KG/M2 | WEIGHT: 95 LBS | HEART RATE: 69 BPM | SYSTOLIC BLOOD PRESSURE: 128 MMHG | DIASTOLIC BLOOD PRESSURE: 75 MMHG

## 2018-05-07 DIAGNOSIS — I48.0 PAROXYSMAL ATRIAL FIBRILLATION (HCC): ICD-10-CM

## 2018-05-07 DIAGNOSIS — E03.8 OTHER SPECIFIED HYPOTHYROIDISM: ICD-10-CM

## 2018-05-07 DIAGNOSIS — F41.9 ANXIETY: ICD-10-CM

## 2018-05-07 LAB — INTERNATIONAL NORMALIZATION RATIO, POC: 2.2

## 2018-05-07 PROCEDURE — 85610 PROTHROMBIN TIME: CPT

## 2018-05-07 PROCEDURE — 99211 OFF/OP EST MAY X REQ PHY/QHP: CPT

## 2018-05-09 RX ORDER — LEVOTHYROXINE SODIUM 0.07 MG/1
TABLET ORAL
Qty: 90 TABLET | Refills: 1 | Status: SHIPPED | OUTPATIENT
Start: 2018-05-09 | End: 2018-01-01 | Stop reason: SDUPTHER

## 2018-05-09 RX ORDER — LORAZEPAM 0.5 MG/1
0.5 TABLET ORAL DAILY PRN
Qty: 30 TABLET | Refills: 0 | OUTPATIENT
Start: 2018-05-09 | End: 2018-06-08

## 2018-05-23 ENCOUNTER — OFFICE VISIT (OUTPATIENT)
Dept: FAMILY MEDICINE CLINIC | Age: 83
End: 2018-05-23
Payer: MEDICARE

## 2018-05-23 VITALS
HEART RATE: 73 BPM | RESPIRATION RATE: 18 BRPM | OXYGEN SATURATION: 90 % | HEIGHT: 60 IN | BODY MASS INDEX: 18.16 KG/M2 | WEIGHT: 92.5 LBS | SYSTOLIC BLOOD PRESSURE: 124 MMHG | TEMPERATURE: 98.3 F | DIASTOLIC BLOOD PRESSURE: 70 MMHG

## 2018-05-23 DIAGNOSIS — I48.0 PAROXYSMAL ATRIAL FIBRILLATION (HCC): ICD-10-CM

## 2018-05-23 DIAGNOSIS — F41.9 ANXIETY: ICD-10-CM

## 2018-05-23 DIAGNOSIS — E78.5 HYPERLIPIDEMIA, UNSPECIFIED HYPERLIPIDEMIA TYPE: ICD-10-CM

## 2018-05-23 DIAGNOSIS — I10 ESSENTIAL HYPERTENSION: ICD-10-CM

## 2018-05-23 DIAGNOSIS — I25.10 CORONARY ARTERY DISEASE INVOLVING NATIVE CORONARY ARTERY OF NATIVE HEART WITHOUT ANGINA PECTORIS: Chronic | ICD-10-CM

## 2018-05-23 DIAGNOSIS — E03.9 HYPOTHYROIDISM, UNSPECIFIED TYPE: Primary | ICD-10-CM

## 2018-05-23 PROCEDURE — 99213 OFFICE O/P EST LOW 20 MIN: CPT | Performed by: INTERNAL MEDICINE

## 2018-05-23 PROCEDURE — G8510 SCR DEP NEG, NO PLAN REQD: HCPCS | Performed by: INTERNAL MEDICINE

## 2018-05-23 RX ORDER — HYDROCHLOROTHIAZIDE 25 MG/1
TABLET ORAL
Refills: 3 | Status: ON HOLD | COMMUNITY
Start: 2018-04-02 | End: 2018-01-01 | Stop reason: HOSPADM

## 2018-05-23 ASSESSMENT — PATIENT HEALTH QUESTIONNAIRE - PHQ9
SUM OF ALL RESPONSES TO PHQ QUESTIONS 1-9: 0
1. LITTLE INTEREST OR PLEASURE IN DOING THINGS: 0
SUM OF ALL RESPONSES TO PHQ9 QUESTIONS 1 & 2: 0
2. FEELING DOWN, DEPRESSED OR HOPELESS: 0

## 2018-06-04 ENCOUNTER — HOSPITAL ENCOUNTER (OUTPATIENT)
Dept: PHARMACY | Age: 83
Setting detail: THERAPIES SERIES
Discharge: HOME | End: 2018-06-04
Payer: MEDICARE

## 2018-06-04 VITALS
BODY MASS INDEX: 18.4 KG/M2 | WEIGHT: 94.2 LBS | DIASTOLIC BLOOD PRESSURE: 79 MMHG | SYSTOLIC BLOOD PRESSURE: 139 MMHG | HEART RATE: 69 BPM

## 2018-06-04 DIAGNOSIS — I48.0 PAROXYSMAL ATRIAL FIBRILLATION (HCC): ICD-10-CM

## 2018-06-04 LAB — INTERNATIONAL NORMALIZATION RATIO, POC: 1.7

## 2018-06-04 PROCEDURE — 85610 PROTHROMBIN TIME: CPT

## 2018-06-04 PROCEDURE — 99211 OFF/OP EST MAY X REQ PHY/QHP: CPT

## 2018-06-07 DIAGNOSIS — E78.00 HYPERCHOLESTEROLEMIA: ICD-10-CM

## 2018-06-07 RX ORDER — ATORVASTATIN CALCIUM 10 MG/1
TABLET, FILM COATED ORAL
Qty: 90 TABLET | Refills: 1 | Status: SHIPPED | OUTPATIENT
Start: 2018-06-07 | End: 2018-01-01 | Stop reason: SDUPTHER

## 2018-06-11 DIAGNOSIS — F41.9 ANXIETY: ICD-10-CM

## 2018-06-11 DIAGNOSIS — F41.9 ANXIETY: Primary | ICD-10-CM

## 2018-06-11 RX ORDER — LORAZEPAM 0.5 MG/1
0.5 TABLET ORAL DAILY PRN
Qty: 30 TABLET | Refills: 0 | Status: CANCELLED | OUTPATIENT
Start: 2018-06-11 | End: 2018-01-01

## 2018-06-11 RX ORDER — LORAZEPAM 0.5 MG/1
0.5 TABLET ORAL DAILY PRN
Qty: 30 TABLET | Refills: 0 | Status: SHIPPED | OUTPATIENT
Start: 2018-06-11 | End: 2018-01-01 | Stop reason: SDUPTHER

## 2018-06-29 NOTE — ED PROVIDER NOTES
light.   Neck: Normal range of motion. Neck supple. Cardiovascular: Intact distal pulses. No murmur heard. Tachycardic   Pulmonary/Chest:   Dffuse wheezing appreciated bilaterally, poor air movement   Abdominal: Soft. There is no tenderness. There is no rebound and no guarding. Musculoskeletal: She exhibits no edema. Lymphadenopathy:     She has no cervical adenopathy. Neurological: She is alert and oriented to person, place, and time. No cranial nerve deficit. Skin: Skin is warm and dry. She is not diaphoretic. Psychiatric: She has a normal mood and affect. Her behavior is normal. Thought content normal.   Nursing note and vitals reviewed. Procedures    MDM  Number of Diagnoses or Management Options  Pneumonia due to organism:   Diagnosis management comments: This is an 80year old female with a PMH of Atrial Fibrillation and COPD who presented to the ED for evaluation of shortness of breath and a cough that had been going on for a week. Patient had significant wheezing on examination. She was evaluated for multiple etiologies including PE, ACS and Pneumonia to mention just a few. Patient was found to have a findings on her CXR suggestive of right lower lobe pneumonia. She was started on Rocephin, Doxycyline and given Duoneb treatments and Solumedrol which improved her symptoms on repeat examination. I discussed the case with Dr. Isaac Kathleen who agreed to admit the patient. Patient was agreeable to plan.              --------------------------------------------- PAST HISTORY ---------------------------------------------  Past Medical History:  has a past medical history of Acute MI, inferior wall ([address] CAD (coronary artery disease); Cardiogenic shock ([address] CHB (complete heart block) ([facility] [address] Dermatitis; Hypercholesterolemia; Hyperlipidemia; Hypertension; Hypothyroidism; Paroxysmal atrial fibrillation ([address] Pneumonia; Shingles; Thyroid disease; and Warfarin-induced coagulopathy ([address]     Past 11.5 - 15.0 fL    Platelets 682 271 - 120 E9/L    MPV 9.8 7.0 - 12.0 fL    Neutrophils % 89.0 (H) 43.0 - 80.0 %    Immature Granulocytes % 0.6 0.0 - 5.0 %    Lymphocytes % 3.2 (L) 20.0 - 42.0 %    Monocytes % 6.9 2.0 - 12.0 %    Eosinophils % 0.1 0.0 - 6.0 %    Basophils % 0.2 0.0 - 2.0 %    Neutrophils # 10.90 (H) 1.80 - 7.30 E9/L    Immature Granulocytes # 0.07 E9/L    Lymphocytes # 0.39 (L) 1.50 - 4.00 E9/L    Monocytes # 0.84 0.10 - 0.95 E9/L    Eosinophils # 0.01 (L) 0.05 - 0.50 E9/L    Basophils # 0.02 0.00 - 0.20 E9/L    Polychromasia 1+     Hypochromia 1+    Troponin   Result Value Ref Range    Troponin 0.01 0.00 - 0.03 ng/mL   Lactic Acid, Plasma   Result Value Ref Range    Lactic Acid 1.2 0.5 - 2.2 mmol/L   Protime-INR   Result Value Ref Range    Protime 22.3 (H) 9.3 - 12.4 sec    INR 2.0        RADIOLOGY:  XR CHEST PORTABLE   Final Result   Findings compatible with emphysema   Tortuous ectatic aorta   There is an infiltrate at the right lung base suspicious for pneumonia                        ------------------------- NURSING NOTES AND VITALS REVIEWED ---------------------------  Date / Time Roomed:  6/29/2018  9:42 AM  ED Bed Assignment:  [id number]    The nursing notes within the ED encounter and vital signs as below have been reviewed.      Patient Vitals for the past 24 hrs:   BP Temp Temp src Pulse Resp SpO2 Height Weight   06/29/18 1323 131/64 98 °F (36.7 °C) Temporal 98 24 96 % - -   06/29/18 1225 119/64 98.6 °F (37 °C) - 101 22 97 % - -   06/29/18 1144 (!) 127/55 - - 102 (!) 35 97 % - -   06/29/18 1057 - - - 99 (!) 34 100 % - -   06/29/18 1048 - - - 99 (!) 35 93 % - -   06/29/18 1036 - - - - (!) 38 100 % - -   06/29/18 1021 - - - 104 22 100 % - -   06/29/18 1012 - - - 101 (!) 37 100 % - -   06/29/18 0944 127/76 98.9 °F (37.2 °C) - 107 20 (!) 88 % 5' 2\" (1.575 m) 92 lb (41.7 kg)       Oxygen Saturation Interpretation: Normal    ------------------------------------------ PROGRESS NOTES ------------------------------------------  Re-evaluation(s):  Time: 9863  Patients symptoms are improving  Repeat physical examination is improved    Counseling:  I have spoken with the patient and discussed todays results, in addition to providing specific details for the plan of care and counseling regarding the diagnosis and prognosis. Their questions are answered at this time and they are agreeable with the plan of admission.    --------------------------------- ADDITIONAL PROVIDER NOTES ---------------------------------  Consultations:   Spoke with Dr. Gypsy Ames  Discussed case. They will admit the patient. This patient's ED course included: a personal history and physicial examination, re-evaluation prior to disposition, multiple bedside re-evaluations, IV medications, cardiac monitoring, continuous pulse oximetry and complex medical decision making and emergency management    This patient has remained hemodynamically stable during their ED course. Diagnosis:  1. Pneumonia due to organism        Disposition:  Patient's disposition: Admit to telemetry  Patient's condition is stable.            Essence Contreras DO  Resident  06/29/18 6403

## 2018-06-30 NOTE — PLAN OF CARE
Problem: Breathing Pattern - Ineffective:  Goal: Ability to achieve and maintain a regular respiratory rate will improve  Ability to achieve and maintain a regular respiratory rate will improve   Outcome: Met This Shift      Problem:  Activity:  Goal: Ability to tolerate increased activity will improve  Ability to tolerate increased activity will improve  Outcome: Met This Shift  Patient transferring form bed to chair independently     Problem: Falls - Risk of:  Goal: Will remain free from falls  Will remain free from falls  Outcome: Met This Shift

## 2018-06-30 NOTE — H&P
History and Physical      CHIEF COMPLAINT:  shortness of breath       HISTORY OF PRESENT ILLNESS:      The patient is a 80 y.o. female patient of Dr Eda Guerrero who presents with shortness of breath and cough. She has a long history of COPD. She is oxygen dependent at home. Last week she began to develop increasing shortness of breath associated with productive cough of yellow phlegm. She denies any fever or chills. Denies any chest pain. She called her pulmonologist and was sent to the hospital. Her shortness breath is exacerbated by exertion and lying flat and relieved by rest. She did not notice significant improvement with nebulizer therapy at home. She denies any hemoptysis. Past Medical History:    Past Medical History:   Diagnosis Date    Acute MI, inferior wall ([address] 1/2004    CAD (coronary artery disease)     Cardiogenic shock (HCC)     Subtotal subacute thrombosis RCA.  CHB (complete heart block) ([address] 01/2004    Transient CHB    Dermatitis     Hypercholesterolemia     Hyperlipidemia     Hypertension     Hypothyroidism     Paroxysmal atrial fibrillation ([facility] [facility]     Pneumonia 9/20/2014    Shingles     Thyroid disease     Warfarin-induced coagulopathy ([address] 9/20/2014       Past Surgical History:    Past Surgical History:   Procedure Laterality Date    ANGIOPLASTY  2004    APPENDECTOMY      CARDIAC PACEMAKER PLACEMENT Left 01/23/2004    [facility] Programmed VVIR. Failed attempt to insert atrial leads. Adequate thresholds could not be obtained trying two different electrodes. Therefore, the atrial lead was removed and the pulse generator atrial port was cancelled with an indiffferent short electrode block.  COLONOSCOPY  04/06/2011    CORONARY ANGIOPLASTY WITH STENT PLACEMENT  1/2009    Dilatation stent of 1/2009 - addition stents x 3.    DIAGNOSTIC CARDIAC CATH LAB PROCEDURE  1/9/2004    [facility]:  Subtotal RCA, partially successful PTCA - stent RCA.     DIAGNOSTIC about 66 years ago. She has a 40.00 pack-year smoking history. She has never used smokeless tobacco. She reports that she does not drink alcohol or use drugs. Family History:   family history includes High Blood Pressure in her mother; Stroke in her mother. REVIEW OF SYSTEMS    Constitutional: negative for chills and fevers  Eyes: negative for icterus and redness  Ears, nose, mouth, throat, and face: negative for epistaxis, hearing loss, nasal congestion, sore mouth, sore throat and tinnitus  Respiratory: positive for cough, dyspnea on exertion, emphysema, shortness of breath and sputum, negative for hemoptysis and pleurisy/chest pain  Cardiovascular: negative except for dyspnea  Gastrointestinal: negative for abdominal pain and vomiting  Genitourinary:negative for dysuria and frequency  Integument/breast: negative for pruritus and rash  Hematologic/lymphatic: negative for bleeding and easy bruising  Musculoskeletal:negative for arthralgias and back pain  Neurological: negative for coordination problems and dizziness  Behavioral/Psych: negative for anxiety and depression  Endocrine: negative for temperature intolerance  Allergic/Immunologic: negative for anaphylaxis and angioedema    PHYSICAL EXAM:    Vitals:  BP (!) 119/56   Pulse 81   Temp 98 °F (36.7 °C) (Oral)   Resp 18   Ht 5' 2\" (1.575 m)   Wt 92 lb (41.7 kg)   SpO2 94%   BMI 16.83 kg/m²     General appearance: alert, appears stated age and cooperative  Head: Normocephalic, without obvious abnormality, atraumatic  Eyes: conjunctivae/corneas clear. PERRL, EOM's intact. Fundi benign. Ears: normal TM's and external ear canals both ears  Nose: Nares normal. Septum midline. Mucosa normal. No drainage or sinus tenderness.   Throat: lips, mucosa, and tongue normal; teeth and gums normal  Neck: no adenopathy, no carotid bruit, no JVD, supple, symmetrical, trachea midline and thyroid not enlarged, symmetric, no tenderness/mass/nodules  Lungs: diminished breath sounds bibasilar and wheezes bilaterally  Heart: regular rate and rhythm, S1, S2 normal, no murmur, click, rub or gallop  Abdomen: soft, non-tender; bowel sounds normal; no masses,  no organomegaly  Extremities: extremities normal, atraumatic, no cyanosis or edema  Pulses: 2+ and symmetric  Skin: Skin color, texture, turgor normal. No rashes or lesions  Neurologic: Grossly normal    LABS:    CBC with Differential:    Lab Results   Component Value Date    WBC 10.8 06/30/2018    RBC 3.45 06/30/2018    HGB 11.2 06/30/2018    HCT 34.7 06/30/2018     06/30/2018    .6 06/30/2018    MCH 32.5 06/30/2018    MCHC 32.3 06/30/2018    RDW 13.4 06/30/2018    SEGSPCT 73 12/02/2011    BANDSPCT 27 09/20/2014    METASPCT 6 09/20/2014    LYMPHOPCT 3.2 06/29/2018    MONOPCT 6.9 06/29/2018    BASOPCT 0.2 06/29/2018    MONOSABS 0.84 06/29/2018    LYMPHSABS 0.39 06/29/2018    EOSABS 0.01 06/29/2018    BASOSABS 0.02 06/29/2018     CMP:    Lab Results   Component Value Date     06/30/2018    K 4.1 06/30/2018    CL 96 06/30/2018    CO2 36 06/30/2018    BUN 30 06/30/2018    CREATININE 0.7 06/30/2018    GFRAA >60 06/30/2018    LABGLOM >60 06/30/2018    GLUCOSE 132 06/30/2018    GLUCOSE 95 03/09/2012    PROT 6.7 06/15/2018    LABALBU 4.2 06/15/2018    LABALBU 4.5 03/09/2012    CALCIUM 9.2 06/30/2018    BILITOT 0.5 06/15/2018    ALKPHOS 64 06/15/2018    AST 39 06/15/2018    ALT 30 06/15/2018     PT/INR:    Lab Results   Component Value Date    PROTIME 34.1 06/30/2018    INR 3.0 06/30/2018     Troponin:    Lab Results   Component Value Date    TROPONINI 0.01 06/29/2018     Collected: 06/29/18 1700   Resulting lab: [address] LABORATORY   Value: FILM ARR ParaInfluenza 3 Virus Detected (Abnormal)   Comment:    *Additional information available - narrative     Results for orders placed or performed during the hospital encounter of 06/29/18   Brain Natriuretic Peptide   Result Value Ref Range     Pro-BNP

## 2018-06-30 NOTE — CONSULTS
LAB PROCEDURE  1/9/2004    [facility]:  Subtotal RCA, partially successful PTCA - stent RCA.  DIAGNOSTIC CARDIAC CATH LAB PROCEDURE  1/12/2--4    [facility]: Acute total occlusion RCA stent.  ECHO COMPL W DOP COLOR FLOW  5/2/2013         HEMORRHOID SURGERY      THROMBECTOMY      [employer].  TOTAL HIP ARTHROPLASTY  7/30/2005 Right. 12/2005 Left.  UPPER GASTROINTESTINAL ENDOSCOPY  8/2004    With colonoscopy.        Current Medications:    Current Facility-Administered Medications: sodium chloride flush 0.9 % injection 10 mL, 10 mL, Intravenous, 2 times per day  sodium chloride flush 0.9 % injection 10 mL, 10 mL, Intravenous, PRN  acetaminophen (TYLENOL) tablet 650 mg, 650 mg, Oral, Q4H PRN  multivitamin 1 tablet, 1 tablet, Oral, Daily  aspirin chewable tablet 81 mg, 81 mg, Oral, Daily  vitamin C (ASCORBIC ACID) tablet 500 mg, 500 mg, Oral, Daily  calcium-vitamin D (OSCAL-500) 500-200 MG-UNIT per tablet 1 tablet, 1 tablet, Oral, Daily  metoprolol tartrate (LOPRESSOR) tablet 50 mg, 50 mg, Oral, BID  lisinopril (PRINIVIL;ZESTRIL) tablet 2.5 mg, 2.5 mg, Oral, Daily  levothyroxine (SYNTHROID) tablet 75 mcg, 75 mcg, Oral, Daily  hydrochlorothiazide (HYDRODIURIL) tablet 25 mg, 25 mg, Oral, Daily  atorvastatin (LIPITOR) tablet 10 mg, 10 mg, Oral, Daily  LORazepam (ATIVAN) tablet 0.5 mg, 0.5 mg, Oral, Daily PRN  sodium chloride flush 0.9 % injection 10 mL, 10 mL, Intravenous, 2 times per day  sodium chloride flush 0.9 % injection 10 mL, 10 mL, Intravenous, PRN  magnesium hydroxide (MILK OF MAGNESIA) 400 MG/5ML suspension 30 mL, 30 mL, Oral, Daily PRN  ondansetron (ZOFRAN) injection 4 mg, 4 mg, Intravenous, Q6H PRN  cefTRIAXone (ROCEPHIN) 1 g in sterile water 10 mL IV syringe, 1 g, Intravenous, Q24H  doxycycline hyclate (VIBRAMYCIN) capsule 100 mg, 100 mg, Oral, 2 times per day  methylPREDNISolone sodium (SOLU-MEDROL) injection 40 mg, 40 mg, Intravenous, Q6H  budesonide (PULMICORT) nebulizer suspension 500 mcg, 0.5 mg, to name, place and time. Cranial nerves II-XII are grossly intact. DATA:    CBC:   Recent Labs      06/29/18   1030  06/30/18   0539   WBC  12.2*  10.8   HGB  11.8  11.2*   HCT  37.7  34.7   MCV  101.6*  100.6*   PLT  237  231       BMP:  Recent Labs      06/29/18   1030  06/30/18   0539   NA  144  142   K  4.0  4.1   CL  96*  96*   CO2  40*  36*   BUN  24*  30*   CREATININE  0.8  0.7    ALB:3,BILIDIR:3,BILITOT:3,ALKPHOS:3)@    PT/INR:   Recent Labs      06/29/18   1030  06/30/18   0539   PROTIME  22.3*  34.1*   INR  2.0  3.0       ABG:   No results for input(s): PH, PO2, PCO2, HCO3, BE, O2SAT, METHB, O2HB, COHB, O2CON, HHB, THB in the last 72 hours. Radiology Review:  CXR reviewed with patchy opacity right lower lung field    IMPRESSION/RECOMMENDATIONS:      AECOPD  Acute on chronic hypoxic and hypercarbic respiratory failure  Parainfluenza 3 (+)      1. Cont with antibiotics, may have gotten bacterial pneumonia on top of viral syndrome  2. Cont with nebs  3. Steroid taper as tolerated  4. Cont with oxygen as needed  5. OOB to chair          Thanks for letting us see this patient in consultation. Please contact us with any questions. Office [phone] or after hours through [employer], x [phone].

## 2018-07-01 NOTE — PROGRESS NOTES
Subjective:    Awake and alert. Feeling better  Denies chest pain or dyspnea. Denies abdominal pain. Tolerating diet. No nausea or vomiting. Objective:    /60   Pulse 82   Temp 98.3 °F (36.8 °C) (Temporal)   Resp 18   Ht 5' 2\" (1.575 m)   Wt 92 lb 9.6 oz (42 kg)   SpO2 97%   BMI 16.94 kg/m²   Skin: Warm and dry  Neck: Supple, no JVD  Heart:  RRR, no murmurs, gallops, or rubs. Lungs:  Diminished bilaterally, scattered rhonchi  Abd: bowel sounds present, nontender, nondistended, no masses  Extrem:  No clubbing, cyanosis, or edema, pulses intact    I/O last 3 completed shifts:   In: 440 [P.O.:440]  Out: 825 [Urine:825]    RESPIRATORY CULTURE [[id number]] (Abnormal) Collected: 06/30/18 1145   Updated: 07/01/18 1029    Specimen Type: Sputum    Specimen Source: Sputum Induced     CULTURE, RESPIRATORY Oral Pharyngeal Carmela absent (A)    Smear, Respiratory --    Group 5: >25 PMN's/LPF and <10 Epithelial cells/LPF   Moderate Polymorphonuclear leukocytes   Epithelial cells not seen   Abundant Gram negative rods     Organism GNR oxidase positive (A)    CULTURE, RESPIRATORY --    Heavy growth   Identification and sensitivity to follow    Narrative:     Source: SPUIN       Site: Sputum&Sputum             Protime-INR [[id number]] (Abnormal) Collected: 07/01/18 0508   Updated: 07/01/18 0891    Specimen Type: Blood    Specimen Source: Blood     Protime 43.3 (H) sec    INR 3.9         Current Facility-Administered Medications   Medication Dose Route Frequency Provider Last Rate Last Dose    sodium chloride flush 0.9 % injection 10 mL  10 mL Intravenous 2 times per day Muir Roll, DO   10 mL at 06/30/18 0924    sodium chloride flush 0.9 % injection 10 mL  10 mL Intravenous PRN Syed Roll, DO        acetaminophen (TYLENOL) tablet 650 mg  650 mg Oral Q4H PRN Syed Roll, DO        multivitamin 1 tablet  1 tablet Oral Daily Elthomas Patel, DO   1 tablet at 07/01/18 0834    aspirin chewable tablet 81 mg  81 mg Oral Daily Virgia Li, DO   81 mg at 07/01/18 9181    vitamin C (ASCORBIC ACID) tablet 500 mg  500 mg Oral Daily Virgia Li, DO   500 mg at 07/01/18 7895    calcium-vitamin D (OSCAL-500) 500-200 MG-UNIT per tablet 1 tablet  1 tablet Oral Daily Virgia Li, DO   1 tablet at 07/01/18 1168    metoprolol tartrate (LOPRESSOR) tablet 50 mg  50 mg Oral BID Virgia Li, DO   50 mg at 07/01/18 7982    lisinopril (PRINIVIL;ZESTRIL) tablet 2.5 mg  2.5 mg Oral Daily Virgia Li, DO   2.5 mg at 07/01/18 3558    levothyroxine (SYNTHROID) tablet 75 mcg  75 mcg Oral Daily Virgia Li, DO   75 mcg at 07/01/18 6996    hydrochlorothiazide (HYDRODIURIL) tablet 25 mg  25 mg Oral Daily Virgia Li, DO   25 mg at 07/01/18 4860    atorvastatin (LIPITOR) tablet 10 mg  10 mg Oral Daily Virgia Li, DO   10 mg at 07/01/18 3795    LORazepam (ATIVAN) tablet 0.5 mg  0.5 mg Oral Daily PRN Virgia Li, DO   0.5 mg at 06/30/18 2336    sodium chloride flush 0.9 % injection 10 mL  10 mL Intravenous 2 times per day Virgia Li, DO   10 mL at 07/01/18 2996    sodium chloride flush 0.9 % injection 10 mL  10 mL Intravenous PRN Virgia Li, DO   10 mL at 07/01/18 1700    magnesium hydroxide (MILK OF MAGNESIA) 400 MG/5ML suspension 30 mL  30 mL Oral Daily PRN Virgia Li, DO        ondansetron [facility] injection 4 mg  4 mg Intravenous Q6H PRN Virgia Li, DO        cefTRIAXone (ROCEPHIN) 1 g in sterile water 10 mL IV syringe  1 g Intravenous Q24H Virgia Li, DO   1 g at 07/01/18 1324    doxycycline hyclate (VIBRAMYCIN) capsule 100 mg  100 mg Oral 2 times per day Virgia Li, DO   100 mg at 07/01/18 0834    methylPREDNISolone sodium (SOLU-MEDROL) injection 40 mg  40 mg Intravenous Q6H Virgia Li, DO   40 mg at 07/01/18 1700    budesonide (PULMICORT) nebulizer suspension 500 mcg  0.5 mg Nebulization BID Lilian Clements MD   500 mcg at 07/01/18 0548    formoterol

## 2018-07-02 NOTE — PROGRESS NOTES
Oral Daily Junnie Hedges, DO   1 tablet at 07/02/18 1047    aspirin chewable tablet 81 mg  81 mg Oral Daily Junnie Hedges, DO   81 mg at 07/02/18 1046    vitamin C (ASCORBIC ACID) tablet 500 mg  500 mg Oral Daily Junnie Hedges, DO   500 mg at 07/02/18 1047    calcium-vitamin D (OSCAL-500) 500-200 MG-UNIT per tablet 1 tablet  1 tablet Oral Daily Junnie Hedges, DO   1 tablet at 07/02/18 1047    metoprolol tartrate (LOPRESSOR) tablet 50 mg  50 mg Oral BID Junnie Hedges, DO   50 mg at 07/02/18 1046    lisinopril (PRINIVIL;ZESTRIL) tablet 2.5 mg  2.5 mg Oral Daily Junnie Hedges, DO   2.5 mg at 07/02/18 1046    levothyroxine (SYNTHROID) tablet 75 mcg  75 mcg Oral Daily Junnie Hedges, DO   75 mcg at 07/02/18 1047    hydrochlorothiazide (HYDRODIURIL) tablet 25 mg  25 mg Oral Daily Junnie Hedges, DO   25 mg at 07/02/18 1047    atorvastatin (LIPITOR) tablet 10 mg  10 mg Oral Daily Junnie Hedges, DO   10 mg at 07/02/18 1047    LORazepam (ATIVAN) tablet 0.5 mg  0.5 mg Oral Daily PRN Junnie Hedges, DO   0.5 mg at 07/01/18 2345    sodium chloride flush 0.9 % injection 10 mL  10 mL Intravenous 2 times per day Junnie Hedges, DO   10 mL at 07/02/18 1048    sodium chloride flush 0.9 % injection 10 mL  10 mL Intravenous PRN Junnie Hedges, DO   10 mL at 07/01/18 1700    magnesium hydroxide (MILK OF MAGNESIA) 400 MG/5ML suspension 30 mL  30 mL Oral Daily PRN Junnie Hedges, DO        ondansetron [facility]) injection 4 mg  4 mg Intravenous Q6H PRN Junnie Hedges, DO        cefTRIAXone (ROCEPHIN) 1 g in sterile water 10 mL IV syringe  1 g Intravenous Q24H Junnie Hedges, DO   1 g at 07/01/18 1324    doxycycline hyclate (VIBRAMYCIN) capsule 100 mg  100 mg Oral 2 times per day Earl Nichole DO   100 mg at 07/02/18 1047    budesonide (PULMICORT) nebulizer suspension 500 mcg  0.5 mg Nebulization BID Joce Rodriguez MD   500 mcg at 07/02/18 0835    formoterol (PERFOROMIST) nebulizer solution 20 mcg  20 mcg Nebulization BID Itzel Bailey MD   20 mcg at 07/02/18 8732    warfarin (COUMADIN) tablet 2.5 mg  2.5 mg Oral Once per day on Sun Mon Tue Wed Thu Sat Rabia Batch, DO   Stopped at 07/01/18 1800    warfarin (COUMADIN) tablet 5 mg  5 mg Oral Once per day on Fri Rabia Batch, DO   5 mg at 06/29/18 1820       Problem list:    Patient Active Problem List   Diagnosis    Hypercholesterolemia    [facility] obstructive [facility]) [facility]    Coronary artery disease    Hypothyroidism    Hypoxemia    Underweight    Bronchitis, acute    Pulmonary hypertension    Mitral regurgitation    History of ST elevation myocardial infarction (STEMI)    CHB (complete heart block) (HCC)    Paroxysmal atrial fibrillation    Hypertension    Hyperlipidemia    Pneumonia    Severe protein-calorie malnutrition ([facility]       Assessment:    1. Acute exacerbation of COPD     2. Right lower lobe pneumonia, Pseudomonas     3. Parainfluenza tracheobronchitis     4. Essential hypertension     5. Paroxysmal atrial fibrillation      6. Hyperlipidemia     7. Pulmonary hypertension     8. Elevated INR    Plan:    1. Continue aerosols    2. Taper steroids    3. Change antibiotics    4. Await final sensitivities    5.  Continue warfarin      Lelo Vera D.O.  2:56 PM  7/2/2018

## 2018-07-02 NOTE — PROGRESS NOTES
[facility]  [address], [address], [address]      Pulmonary Progress Note      SUBJECTIVE:  Claims doing better with breathing and less sputum production, on NC and ambulating minimally in room    OBJECTIVE    Medications    Continuous Infusions:    Scheduled Meds:   sodium chloride flush  10 mL Intravenous 2 times per day    multivitamin  1 tablet Oral Daily    aspirin  81 mg Oral Daily    ascorbic acid  500 mg Oral Daily    calcium-vitamin D  1 tablet Oral Daily    metoprolol tartrate  50 mg Oral BID    lisinopril  2.5 mg Oral Daily    levothyroxine  75 mcg Oral Daily    hydrochlorothiazide  25 mg Oral Daily    atorvastatin  10 mg Oral Daily    sodium chloride flush  10 mL Intravenous 2 times per day    cefTRIAXone (ROCEPHIN) IV  1 g Intravenous Q24H    doxycycline hyclate  100 mg Oral 2 times per day    methylPREDNISolone  40 mg Intravenous Q6H    budesonide  0.5 mg Nebulization BID    formoterol  20 mcg Nebulization BID    warfarin  2.5 mg Oral Once per day on Sun Mon Tue Wed Thu Sat    warfarin  5 mg Oral Once per day on Fri       PRN Meds:sodium chloride flush, acetaminophen, LORazepam, sodium chloride flush, magnesium hydroxide, ondansetron    Physical    VITALS:  /66   Pulse 80   Temp 98 °F (36.7 °C) (Temporal)   Resp 18   Ht 5' 2\" (1.575 m)   Wt 92 lb 9.6 oz (42 kg)   SpO2 97%   BMI 16.94 kg/m²     24HR INTAKE/OUTPUT:      Intake/Output Summary (Last 24 hours) at 18 0918  Last data filed at 18 0811   Gross per 24 hour   Intake              360 ml   Output              875 ml   Net             -515 ml       24HR PULSE OXIMETRY RANGE:    SpO2  Av %  Min: 97 %  Max: 97 %    General appearance: alert, appears stated age and cooperative  Lungs: bilateral ronchi with cough  Heart: regular rate and rhythm, S1, S2 normal, no murmur, click, rub or gallop  Abdomen: soft, non-tender; bowel

## 2018-07-02 NOTE — CARE COORDINATION
Met with pt at bedside, discussed transition of care, and role. Plan is home with niece and sister when medically stable, pt currently attends OP therapy, and has home oxygen and nebulier through [facility], has cane at home she uses on an as needed basis, confirmed that niece will provide transportation at discharge.

## 2018-07-03 NOTE — PROGRESS NOTES
[facility]  [address] [facility], [address], [address]       Pulmonary Progress Note      SUBJECTIVE:  Still with mod cough and minimal sputum production, harsh, claims weak and not sure about going home, gradual improvement compared to when initially admitted    OBJECTIVE    Medications    Continuous Infusions:    Scheduled Meds:   methylPREDNISolone  40 mg Intravenous Q12H    cefepime  1 g Intravenous Q12H    sodium chloride flush  10 mL Intravenous 2 times per day    multivitamin  1 tablet Oral Daily    aspirin  81 mg Oral Daily    ascorbic acid  500 mg Oral Daily    calcium-vitamin D  1 tablet Oral Daily    metoprolol tartrate  50 mg Oral BID    lisinopril  2.5 mg Oral Daily    levothyroxine  75 mcg Oral Daily    hydrochlorothiazide  25 mg Oral Daily    atorvastatin  10 mg Oral Daily    sodium chloride flush  10 mL Intravenous 2 times per day    doxycycline hyclate  100 mg Oral 2 times per day    budesonide  0.5 mg Nebulization BID    formoterol  20 mcg Nebulization BID    warfarin  2.5 mg Oral Once per day on Sun Mon Tue Wed Thu Sat    warfarin  5 mg Oral Once per day on Fri       PRN Meds:sodium chloride flush, acetaminophen, LORazepam, sodium chloride flush, magnesium hydroxide, ondansetron    Physical    VITALS:  BP (!) 146/72   Pulse 70   Temp 97.2 °F (36.2 °C) (Oral)   Resp 22   Ht 5' 2\" (1.575 m)   Wt 92 lb 9.6 oz (42 kg)   SpO2 96%   BMI 16.94 kg/m²     24HR INTAKE/OUTPUT:      Intake/Output Summary (Last 24 hours) at 18 1434  Last data filed at 18 1414   Gross per 24 hour   Intake              760 ml   Output             1750 ml   Net             -990 ml       24HR PULSE OXIMETRY RANGE:    SpO2  Av.2 %  Min: 94 %  Max: 98 %    General appearance: alert, appears stated age and cooperative  Lungs: rhonchi bilaterally  Heart: regular rate and rhythm, S1, S2 normal, no murmur, click, rub or gallop  Abdomen: soft, non-tender; bowel sounds normal; no masses,  no organomegaly  Extremities: extremities normal, atraumatic, no cyanosis or edema  Neurologic: Mental status: Alert, oriented, thought content appropriate    Data    CBC: No results for input(s): WBC, HGB, HCT, MCV, PLT in the last 72 hours. BMP:No results for input(s): NA, K, CL, CO2, PHOS, BUN, CREATININE in the last 72 hours. Invalid input(s): CA ALB:3,BILIDIR:3,BILITOT:3,ALKPHOS:3)@    PT/INR:   Recent Labs      07/01/18   0508  07/02/18   0623  07/03/18   0608   PROTIME  43.3*  32.7*  23.3*   INR  3.9  2.9  2.1       ABG:   No results for input(s): PH, PO2, PCO2, HCO3, BE, O2SAT, METHB, O2HB, COHB, O2CON, HHB, THB in the last 72 hours. Radiology/Other tests reviewed: CXR reviewed with improved aeration right lower lung field, still with some opacity at right lower lung field    Assessment:     Active Problems:    COPD (chronic obstructive pulmonary disease) ([facility] [address]    Pneumonia  Resolved Problems:    * No resolved hospital problems. *  parainfluenza 3+  AECOPD  Hypoxia  pneumonia    Plan:       1. Sputum with pseudomonas, pan-sensitive, cont with antibiotics  2. IPPB and see if helps with expectoration  3. Cont with nebs  4. Steroid taper as tolerated, try prednisone tomorrow  5. Cont with oxygen  6. Ambulate as tolerated, PT/OT  7. Possible discharge prior to weekend, observe tomorrow if better with cough/sputum        Thanks for letting us see this patient in consultation. Please contact us with any questions. Office [phone] or after hours through [employer], x [phone].

## 2018-07-03 NOTE — PROGRESS NOTES
Called Dr Radha Quijano Re: pt's persistent productive cough, waiting call back    Informed Dr Radha Quijano of cough, orders given

## 2018-07-03 NOTE — PLAN OF CARE
Problem: Breathing Pattern - Ineffective:  Goal: Ability to achieve and maintain a regular respiratory rate will improve  Ability to achieve and maintain a regular respiratory rate will improve   Outcome: Met This Shift      Problem: Falls - Risk of:  Goal: Will remain free from falls  Will remain free from falls   Outcome: Met This Shift

## 2018-07-04 NOTE — PROGRESS NOTES
Subjective:    Awake and alert. Still much mucus and congestion  Continues to feel very weak, does not feel comfortable going home  Denies chest pain less dyspnea. Denies abdominal pain. Tolerating diet. No nausea or vomiting. Objective:    BP (!) 123/57   Pulse 88   Temp 98 °F (36.7 °C) (Oral)   Resp 18   Ht 5' 2\" (1.575 m)   Wt 92 lb 9.6 oz (42 kg)   SpO2 98%   BMI 16.94 kg/m²   Skin: Warm and dry  Neck: Supple, no JVD  Heart:  RRR, no murmurs, gallops, or rubs. Lungs:  Scattered rhonchi  Abd: bowel sounds present, nontender, nondistended, no masses  Extrem:  No clubbing, cyanosis, or edema, pulses intact    I/O last 3 completed shifts:   In: 480 [P.O.:480]  Out: 900 [Urine:900]    Laboratory:     PT/INR:    Lab Results   Component Value Date    PROTIME 27.6 07/04/2018    INR 2.4 07/04/2018        Current Facility-Administered Medications   Medication Dose Route Frequency Provider Last Rate Last Dose    predniSONE (DELTASONE) tablet 30 mg  30 mg Oral Daily Baldo Arredondo MD   30 mg at 07/04/18 9095    albuterol (ACCUNEB) nebulizer solution 0.63 mg  0.63 mg Nebulization Q4H While awake Baldo Arredondo MD   0.63 mg at 07/03/18 2023    benzonatate (TESSALON) capsule 100 mg  100 mg Oral Q4H PRN Baldo Arredondo MD   100 mg at 07/03/18 2047    benzocaine-menthol (CEPACOL SORE THROAT) lozenge 1 lozenge  1 lozenge Oral Q2H PRN Baldo Arredondo MD        cefepime (MAXIPIME) 1 g IVPB minibag  1 g Intravenous Q12H Katia Ortiz DO   Stopped at 07/04/18 0430    sodium chloride flush 0.9 % injection 10 mL  10 mL Intravenous 2 times per day Aly Piña DO   10 mL at 07/03/18 2130    sodium chloride flush 0.9 % injection 10 mL  10 mL Intravenous PRN Aly Piña DO        acetaminophen (TYLENOL) tablet 650 mg  650 mg Oral Q4H PRN Aly Piña DO        multivitamin 1 tablet  1 tablet Oral Daily Katia Ortiz DO   1 tablet at 07/04/18 5039    aspirin chewable tablet 81 mg  81 mg Oral Daily Virgia Li, DO   81 mg at 07/04/18 4553    vitamin C (ASCORBIC ACID) tablet 500 mg  500 mg Oral Daily Virgia Li, DO   500 mg at 07/04/18 0927    calcium-vitamin D (OSCAL-500) 500-200 MG-UNIT per tablet 1 tablet  1 tablet Oral Daily Virgia Li, DO   1 tablet at 07/04/18 0829    metoprolol tartrate (LOPRESSOR) tablet 50 mg  50 mg Oral BID Virgia Li, DO   50 mg at 07/04/18 2634    lisinopril (PRINIVIL;ZESTRIL) tablet 2.5 mg  2.5 mg Oral Daily Virgia Li, DO   2.5 mg at 07/04/18 6935    levothyroxine (SYNTHROID) tablet 75 mcg  75 mcg Oral Daily Virgia Li, DO   75 mcg at 07/04/18 6346    hydrochlorothiazide (HYDRODIURIL) tablet 25 mg  25 mg Oral Daily Virgia Li, DO   25 mg at 07/04/18 4040    atorvastatin (LIPITOR) tablet 10 mg  10 mg Oral Daily Virgia Li, DO   10 mg at 07/04/18 4359    LORazepam (ATIVAN) tablet 0.5 mg  0.5 mg Oral Daily PRN Virgia Li, DO   0.5 mg at 07/04/18 0023    sodium chloride flush 0.9 % injection 10 mL  10 mL Intravenous 2 times per day Virgia Li, DO   10 mL at 07/04/18 7257    sodium chloride flush 0.9 % injection 10 mL  10 mL Intravenous PRN Virgia Li, DO   10 mL at 07/03/18 1501    magnesium hydroxide (MILK OF MAGNESIA) 400 MG/5ML suspension 30 mL  30 mL Oral Daily PRN Virgia Li, DO        [facility]dansetron [facility]) injection 4 mg  4 mg Intravenous Q6H PRN Virgia Li, DO        doxycycline hyclate (VIBRAMYCIN) capsule 100 mg  100 mg Oral 2 times per day Virgia Li, DO   100 mg at 07/04/18 2119    budesonide (PULMICORT) nebulizer suspension 500 mcg  0.5 mg Nebulization BID Lilian Clements MD   500 mcg at 07/04/18 1046    formoterol (PERFOROMIST) nebulizer solution 20 mcg  20 mcg Nebulization BID Lilian Clements MD   20 mcg at 07/04/18 1045    warfarin (COUMADIN) tablet 2.5 mg  2.5 mg Oral Once per day on Sun Mon Tue Wed Thu Sat Jody Colorado DO   2.5 mg at 07/03/18 1732    warfarin (COUMADIN) tablet 5 mg  5 mg Oral Once per day on Fri Lisa Salazar DO   5 mg at 06/29/18 6560       Problem list:    Patient Active Problem List   Diagnosis    Hypercholesterolemia    [facility] obstructive [facility] disease) ([facility] [address]    Coronary artery disease    Hypothyroidism    Hypoxemia    Underweight    Bronchitis, acute    Pulmonary hypertension    Mitral regurgitation    History of ST elevation myocardial infarction (STEMI)    CHB (complete heart block) (HCC)    Paroxysmal atrial fibrillation    Hypertension    Hyperlipidemia    Pneumonia    Severe protein-calorie malnutrition (HCC)       Assessment:    1. Acute exacerbation of COPD     2. Right lower lobe pneumonia, Pseudomonas, Improving     3. Parainfluenza tracheobronchitis     4. Essential hypertension     5. Paroxysmal atrial fibrillation      6. Hyperlipidemia     7. Pulmonary hypertension    Plan:    1. Continue aerosols. 2. Continue antibiotics    3.  Discussed with pulmonary, possible bronchoscopy if no improvement, patient currently declining procedure      Lisa Salazar D.O., [facility]  11:35 AM  7/4/2018

## 2018-07-05 NOTE — PROGRESS NOTES
Classification: BMI <18.5 Underweight  · Comparative Standards (Estimated Nutrition Needs):  · Estimated Daily Total Kcal: >1000 ( x 1.3 SF)  · Estimated Daily Protein (g): 55-65    Estimated Intake vs Estimated Needs: Intake Less Than Needs    Nutrition Risk Level: Moderate    Nutrition Interventions:   Continue current diet, Continue current ONS  Continued Inpatient Monitoring, Coordination of Care    Nutrition Evaluation:   · Evaluation: Goals set   · Goals: Consume > 75% of meals/ONS    · Monitoring: Meal Intake, Supplement Intake, Diet Tolerance, Weight, Comparative Standards, Pertinent Labs    See Adult Nutrition Doc Flowsheet for more detail.      Electronically signed by Dayana Payne RD, LD on 7/5/18 at 3:21 PM    Contact Number:  Ext [phone]

## 2018-07-05 NOTE — PROGRESS NOTES
on Fri Stephanie Bingham, DO   5 mg at 06/29/18 1820       Problem list:    Patient Active Problem List   Diagnosis    Hypercholesterolemia    [facility] [address]    Coronary artery disease    Hypothyroidism    Hypoxemia    Underweight    Bronchitis, acute    Pulmonary hypertension    Mitral regurgitation    History of ST elevation myocardial infarction (STEMI)    CHB (complete heart block) (HCC)    Paroxysmal atrial fibrillation    Hypertension    Hyperlipidemia    Pneumonia    Severe protein-calorie malnutrition (HCC)       Assessment:    1. Acute exacerbation of COPD     2. Right lower lobe pneumonia, Pseudomonas, Improving     3. Parainfluenza tracheobronchitis     4. Essential hypertension     5. Paroxysmal atrial fibrillation      6. Hyperlipidemia     7. Pulmonary hypertension    Plan:    1. Continue aerosols    2. Continue antibiotics    3. Patient now agreeable to bronchoscopy    4. Recheck labs in a.m.       ADDY Nielsen D.O.  1:10 PM  7/5/2018

## 2018-07-05 NOTE — PROGRESS NOTES
Ideal Body 84%  · Adjusted Body Wt:  , body weight adjusted for    · BMI Classification: BMI <18.5 Underweight  · Comparative Standards (Estimated Nutrition Needs):  · Estimated Daily Total Kcal: >1000 ( x 1.3 SF)  · Estimated Daily Protein (g): 55-65    Estimated Intake vs Estimated Needs: Intake Less Than Needs    Nutrition Risk Level: Moderate    Nutrition Interventions:   Continue current diet, Continue current ONS  Continued Inpatient Monitoring, Coordination of Care    Nutrition Evaluation:   · Evaluation: Goals set   · Goals: Consume > 75% of meals/ONS    · Monitoring: Meal Intake, Supplement Intake, Diet Tolerance, Weight, Comparative Standards, Pertinent Labs    See Adult Nutrition Doc Flowsheet for more detail.      Electronically signed by Celestino Abrams RD, LD on 7/5/18 at 3:19 PM    Contact Number:  Ext [phone]

## 2018-07-05 NOTE — PLAN OF CARE
Problem: Breathing Pattern - Ineffective:  Goal: Ability to achieve and maintain a regular respiratory rate will improve  Ability to achieve and maintain a regular respiratory rate will improve   Outcome: Met This Shift      Problem:  Activity:  Goal: Ability to tolerate increased activity will improve  Ability to tolerate increased activity will improve   Outcome: Met This Shift      Problem: Falls - Risk of:  Goal: Will remain free from falls  Will remain free from falls   Outcome: Met This Shift    Goal: Absence of physical injury  Absence of physical injury   Outcome: Met This Shift      Problem: Nutrition  Goal: Optimal nutrition therapy  Outcome: Met This Shift      Problem: Musculor/Skeletal Functional Status  Goal: Highest potential functional level  Outcome: Met This Shift    Goal: Absence of falls  Outcome: Met This Shift

## 2018-07-05 NOTE — PLAN OF CARE
Problem: Nutrition  Goal: Optimal nutrition therapy  Outcome: Ongoing  Nutrition Problem: Severe malnutrition, in context of chronic illness  Intervention: Food and/or Nutrient Delivery: Continue current diet, Continue current ONS  Nutritional Goals: Consume > 75% of meals/ONS

## 2018-07-06 NOTE — PROGRESS NOTES
normal, no murmur, click, rub or gallop  Abdomen: soft, non-tender; bowel sounds normal; no masses,  no organomegaly  Extremities: extremities normal, atraumatic, no cyanosis or edema  Neurologic: Mental status: Alert, oriented, thought content appropriate    Data    CBC:   Recent Labs      07/06/18 0519   WBC  12.8*   HGB  10.7*   HCT  35.8   MCV  105.3*   PLT  311       BMP:  Recent Labs      07/06/18 0519   NA  146   K  4.8   CL  98   CO2  40*   BUN  58*   CREATININE  0.9    ALB:3,BILIDIR:3,BILITOT:3,ALKPHOS:3)@    PT/INR:   Recent Labs      07/04/18   0647  07/05/18   0630 07/06/18 0519   PROTIME  27.6*  27.6*  25.6*   INR  2.4  2.4  2.2       ABG:   No results for input(s): PH, PO2, PCO2, HCO3, BE, O2SAT, METHB, O2HB, COHB, O2CON, HHB, THB in the last 72 hours. Radiology/Other tests reviewed: none    Assessment:     Active Problems:    COPD (chronic obstructive pulmonary disease) ([facility])    Pneumonia  Resolved Problems:    * No resolved hospital problems. *  parainfluenza 3+  Hypoxia  Pneumonia      Plan:       1. Cont with nebs and EZPAP  2. Cont with steroid taper  3. Ambulate as tolerated  4. Can be discharged from pulmonary pov  5. ffup as out-pt in 2-3 weeks        Thanks for letting us see this patient in consultation. Please contact us with any questions. Office [phone] or after hours through [employer], x [phone].

## 2018-07-06 NOTE — PROGRESS NOTES
Physical Therapy  Facility/Department: [facility]  Daily Treatment Note  NAME: Mirlande Tay  : 1934  MRN: [id number]    Date of Service: 2018  Evaluating Therapist: Marlene Osler, DPT     Room #: 0854I  DIAGNOSIS: PNA  PRECAUTIONS: falls, contact  Pertinent Medical History: h/o MI, CAD, HLD, PNA, HTN, hypothyroidism, h/o shingles, pacemaker     Social:    Pt lives with niece, sister, and one other family member. Pt lives in a house with first floor setup with 2 steps and 1 hand rail(s) to enter. .   Prior to admission pt used ww and required occasional assistance with IADLs.      Initial Evaluation  Date: 18 Treatment  Date: 18 Short Term/ Long Term   Goals   Was pt agreeable to Eval/treatment? Yes   yes     Did pt report pain? Pt denied pain No c./o pain       Bed Mobility  Rolling: SBA  Supine to sit: SBA  Sit to supine: sSBA  Scooting: SBA Rolling: SBA  Supine to sit: SBA  Sit to supine: SBA  Scooting: SBA Mod I   Transfers Sit to stand: SBA   Stand to sit: SBA  Stand pivot: SBA  Sit to stand: SBA cuing for safety   Stand to sit: Supervision   Stand pivot: Supervision  w/ww Mod I   Ambulation   100 feet with ww with SBA within room 100 ft x 1 w/ww Supervision  >150 feet with ww with mod I   Stair negotiation: ascended and descended NT   NT limited by isolation prec 2 steps with 1 rail with mod I   AMPA Raw Score          Balance: fair + w/ww       Additional Comments: encouragement to participate, pt reported wanting to rest . Agreed to participate if she could return to bed after. Pt requires increased time to perform bed mobility and used bed rail. Pt sat EOB x 8 mins Supervision, and donned her socks and shoes. Cued for hand placement/ safety w/ sit to stand. Pt has slow neal , decreased step/ stride length , requires increased time to perform above distance. Pt returned to bed as noted. Pt was left supine with call light left by patient.     Chair/bed alarm:

## 2018-07-06 NOTE — DISCHARGE SUMMARY
Physician Discharge Summary     Patient ID:  Mary Harding  [id number]  67 y.o.  1934    Admit date: 6/29/2018    Discharge date and time: 7/6/2018 2:36 PM        Admission Diagnoses: Pneumonia [J18.9]  [facility] [J44.9]    Discharge Diagnoses:     1. Acute exacerbation of COPD     2. Right lower lobe pneumonia, Pseudomonas, Improving     3. Parainfluenza tracheobronchitis     4. Essential hypertension     5. Paroxysmal atrial fibrillation      6. Hyperlipidemia     7. Pulmonary hypertension    Consults: pulmonary/intensive care    Procedures: none    Laboratory:   Last 3 BMP  Recent Labs      07/06/18   0519   NA  146   K  4.8   CL  98   CO2  40*   BUN  58*   CREATININE  0.9   GLUCOSE  75   CALCIUM  9.9       Last 3 CBC:  Recent Labs      07/06/18   0519   WBC  12.8*   RBC  3.40*   HGB  10.7*   HCT  35.8   MCV  105.3*   MCH  31.5   MCHC  29.9*   RDW  13.2   PLT  311   MPV  10.4       Recent Labs      07/04/18   0647  07/05/18   0630  07/06/18   0519   PROTIME  27.6*  27.6*  25.6*   INR  2.4  2.4  2.2       Hospital Course:        The patient is a 80 y.o. female patient of Dr Emily Nuñez who presents with shortness of breath and cough. She has a long history of COPD. She is oxygen dependent at home. Last week she began to develop increasing shortness of breath associated with productive cough of yellow phlegm. She denies any fever or chills. Denies any chest pain. She called her pulmonologist and was sent to the hospital. Her shortness breath is exacerbated by exertion and lying flat and relieved by rest. She did not notice significant improvement with nebulizer therapy at home. She denies any hemoptysis. She was seen by pulmonary and improved with aggressive respiratory therapy and intravenous steroid therapy. She is eventually discharged home with home health care and a steroid taper.     Discharge Exam:  See progress note from today    Disposition: home    Patient

## 2018-07-11 NOTE — PATIENT INSTRUCTIONS
amount of medicine each time you use it. A metered-dose inhaler gives medicine in the form of a liquid mist.  Your doctor may want you to use a spacer with your inhaler. A spacer is a chamber that you attach to the inhaler. The chamber holds the medicine before you inhale it. That way, you can inhale the medicine in as many breaths as you need. Doctors recommend using a spacer with most metered-dose inhalers, especially those with corticosteroid medicines. Follow-up care is a key part of your treatment and safety. Be sure to make and go to all appointments, and call your doctor if you are having problems. It's also a good idea to know your test results and keep a list of the medicines you take. How can you care for yourself at home? To get started using your inhaler  · Talk with your health care provider to be sure you are using your inhaler the right way. It might help if you practice using it in front of a mirror. Use the inhaler exactly as prescribed. · Check that you have the correct medicine. If you use more than one inhaler, put a label on each one. This will let you know which one to use at the right time. · Keep track of how much medicine is in the inhaler. Check the label to see how many doses are in the container. If you know how many puffs you can take, you can replace the inhaler before you run out. Ask your health care provider how you can keep track of how much medicine is left. · Use a spacer if you have problems pressing the inhaler and breathing in at the same time. You also may need a spacer if you are using corticosteroid medicines. · If you are using a corticosteroid inhaler, gargle and rinse out your mouth with water after use. Do not swallow the water. Swallowing the water will increase the chance that the medicine will get into your bloodstream. This may make it more likely that you will have side effects. To use a spacer with an inhaler  1. Shake the inhaler.  Remove the inhaler cap, emergency. I feel uncomfortable, but I am not in danger. I can keep going even if I feel anxious. \"  · Be kind to your body:  ¨ Relieve tension with exercise or a massage. ¨ Get enough rest.  ¨ Avoid alcohol, caffeine, nicotine, and illegal drugs. They can increase your anxiety level and cause sleep problems. ¨ Learn and do relaxation techniques. See below for more about these techniques. · Engage your mind. Get out and do something you enjoy. Go to a funny movie, or take a walk or hike. Plan your day. Having too much or too little to do can make you anxious. · Keep a record of your symptoms. Discuss your fears with a good friend or family member, or join a support group for people with similar problems. Talking to others sometimes relieves stress. · Get involved in social groups, or volunteer to help others. Being alone sometimes makes things seem worse than they are. · Get at least 30 minutes of exercise on most days of the week to relieve stress. Walking is a good choice. You also may want to do other activities, such as running, swimming, cycling, or playing tennis or team sports. Relaxation techniques  Do relaxation exercises 10 to 20 minutes a day. You can play soothing, relaxing music while you do them, if you wish. · Tell others in your house that you are going to do your relaxation exercises. Ask them not to disturb you. · Find a comfortable place, away from all distractions and noise. · Lie down on your back, or sit with your back straight. · Focus on your breathing. Make it slow and steady. · Breathe in through your nose. Breathe out through either your nose or mouth. · Breathe deeply, filling up the area between your navel and your rib cage. Breathe so that your belly goes up and down. · Do not hold your breath. · Breathe like this for 5 to 10 minutes. Notice the feeling of calmness throughout your whole body.   As you continue to breathe slowly and deeply, relax by doing the following for another 5 to 10 minutes:  · Tighten and relax each muscle group in your body. You can begin at your toes and work your way up to your head. · Imagine your muscle groups relaxing and becoming heavy. · Empty your mind of all thoughts. · Let yourself relax more and more deeply. · Become aware of the state of calmness that surrounds you. · When your relaxation time is over, you can bring yourself back to alertness by moving your fingers and toes and then your hands and feet and then stretching and moving your entire body. Sometimes people fall asleep during relaxation, but they usually wake up shortly afterward. · Always give yourself time to return to full alertness before you drive a car or do anything that might cause an accident if you are not fully alert. Never play a relaxation tape while you drive a car. When should you call for help? Call 911 anytime you think you may need emergency care. For example, call if:    · You feel you cannot stop from hurting yourself or someone else.   [place] the numbers for these national suicide hotlines: 4-586-897-TALK (9-842.511.8197) and 8-568-HGGWLGX (6-169.903.7271). If you or someone you know talks about suicide or feeling hopeless, get help right away.   Watch closely for changes in your health, and be sure to contact your doctor if:    · You have anxiety or fear that affects your life.     · You have symptoms of anxiety that are new or different from those you had before. Where can you learn more? Go to [email or web address]://OctoshapepeBoomsense.MaPS. org and sign in to your ETARGET account. Enter P754 in the ChinaPNR box to learn more about \"Anxiety Disorder: Care Instructions. \"     If you do not have an account, please click on the \"Sign Up Now\" link. Current as of: December 7, 2017  Content Version: 11.6  © 6567-0266 [employer]. Care instructions adapted under license by [facility] ([facility]).  If you have questions about a medical condition or this instruction, always ask your healthcare professional. [place] [address] any warranty or liability for your use of this information.

## 2018-07-11 NOTE — PROGRESS NOTES
levalbuterol (XOPENEX) 1.25 MG/3ML nebulizer solution  Take 1 ampule by nebulization 3 times daily              levothyroxine (SYNTHROID) 75 MCG tablet  TAKE 1 TABLET BY MOUTH EVERY DAY             LORazepam (ATIVAN) 0.5 MG tablet  Take 1 tablet by mouth nightly for 30 days. .             metoprolol tartrate (LOPRESSOR) 50 MG tablet  Take 1 tablet by mouth 2 times daily             multivitamin (THERAGRAN) per tablet  Take 1 tablet by mouth daily. predniSONE (DELTASONE) 10 MG tablet  3 tabs daily x3 days, 2 tabs daily x3 days, 1 tab daily x3 days             warfarin (COUMADIN) 2.5 MG tablet  Take 2.5 mg by mouth Six times weekly Daily EXCEPT Friday                   Medications marked \"taking\" at this time  Outpatient Prescriptions Marked as Taking for the 7/11/18 encounter (Office Visit) with Emma Rosen MD   Medication Sig Dispense Refill    LORazepam (ATIVAN) 0.5 MG tablet Take 1 tablet by mouth nightly for 30 days. . 30 tablet 0    benzonatate (TESSALON) 100 MG capsule Take 1 capsule by mouth every 4 hours as needed for Cough 30 capsule 1    predniSONE (DELTASONE) 10 MG tablet 3 tabs daily x3 days, 2 tabs daily x3 days, 1 tab daily x3 days 18 tablet 0    Biotin 1000 MCG TABS Take 3,000 mcg by mouth daily      atorvastatin (LIPITOR) 10 MG tablet TAKE 1 TABLET BY MOUTH EVERY DAY 90 tablet 1    BREO ELLIPTA 200-25 MCG/INH AEPB USE 1 INHALATION ONCE DAILY. 5    levothyroxine (SYNTHROID) 75 MCG tablet TAKE 1 TABLET BY MOUTH EVERY DAY 90 tablet 1    warfarin (COUMADIN) 2.5 MG tablet Take 2.5 mg by mouth Six times weekly Daily EXCEPT Friday      levalbuterol (XOPENEX) 1.25 MG/3ML nebulizer solution Take 1 ampule by nebulization 3 times daily       metoprolol tartrate (LOPRESSOR) 50 MG tablet Take 1 tablet by mouth 2 times daily 180 tablet 3    ascorbic acid (VITAMIN C) 500 MG tablet Take 500 mg by mouth daily.       Coenzyme Q10 (CO Q 10) 100 MG CAPS Take 1 capsule by mouth daily  Fish Oil-Cholecalciferol (FISH OIL + D3) 1525-6644 MG-UNIT CAPS Take 1 capsule by mouth daily       calcium-vitamin D (OSCAL-500) 500-200 MG-UNIT per tablet Take 1 tablet by mouth daily.  aspirin 81 MG tablet Take 81 mg by mouth daily.  multivitamin (THERAGRAN) per tablet Take 1 tablet by mouth daily. Medications patient taking as of now reconciled against medications ordered at time of hospital discharge: Yes    Vitals:    07/11/18 0911   BP: 112/60   Pulse: 78   Resp: 18   Temp: 98.3 °F (36.8 °C)   TempSrc: Oral   SpO2: 98%   Weight: 91 lb 8 oz (41.5 kg)   Height: 5' (1.524 m)     Body mass index is 17.87 kg/m². Wt Readings from Last 3 Encounters:   07/11/18 91 lb 8 oz (41.5 kg)   06/30/18 92 lb 9.6 oz (42 kg)   06/22/18 91 lb 9.6 oz (41.5 kg)     BP Readings from Last 3 Encounters:   07/11/18 112/60   07/06/18 (!) 107/51   06/22/18 (!) 157/88        Inpatient course: Discharge summary reviewed- see chart. Chief Complaint   Patient presents with    Care Management     transitional care visit; pneumonia     History of Present illness - Follow up of Hospital diagnosis(es): COPD exacerbation  Right lower lobe pneumonia secondary to Pseudomonas. Tracheo bronchitis. Atrial fibrillation  Hypertension  Pt is alert awake and on supplemental oxygen. Was admitted on 6/29/18 to hospital and discharged on 7/6/18. Was seen by Dr GELLER [facility] and also seen by Pulmonary specialist Dr Mayte Ramon. Treated for the COPD and Pneumonia and discharged on 7/6/18 with home health care nursing. Non face to face  following discharge, date last encounter closed (first attempt may have been earlier): 7/7/2018 12:25 PM    Call initiated 2 business days of discharge: Yes     Interval history/Current status: Alert,awake oriented and on supplemental oxygen. Per family member she is eating better and family making sure she has 3 meals daily and trying to arrange for supplements   She is on oxygen 3 litres and oxygen increased today to 4 litres after talking to her lung specialist Dr Anita Bryant. Physical Exam:  General Appearance: alert and oriented to person, place and time, fairly developed and not well nourished but  in no acute distress and comfortable on oxygen  Skin: warm and dry, no rash or erythema  Head: normocephalic and atraumatic  Eyes: pupils equal, round, and reactive to light, extraocular eye movements intact, conjunctivae normal  ENT: tympanic membrane, external ear and ear canal normal bilaterally, nose without deformity, nasal mucosa and turbinates normal without polyps  Neck: supple and non-tender without mass, no thyromegaly or thyroid nodules, no cervical lymphadenopathy  Pulmonary/Chest: clear to auscultation bilaterally- no wheezes, rales or rhonchi, normal air movement, no respiratory distress  Breath sounds are decreased at the lung bases. No rales and no rhonchi audible. Cardiovascular: normal rate, regular rhythm, normal S1 and S2, no murmurs, rubs, clicks, or gallops, distal pulses intact, no carotid bruits  Abdomen: soft, non-tender, non-distended, normal bowel sounds, no masses or organomegaly  Extremities: no cyanosis, clubbing or edema Homans sign is negative. Musculoskeletal: normal range of motion, no joint swelling, deformity or tenderness  Neurologic: reflexes normal and symmetric, no cranial nerve deficit, gait is slow and cautious and uses a cane for ambulation. She moves all her extremeties on verbal command. No hand tremor. Some difficulty in performing the finger nose test and Knee heel test.        Assessment/Plan:  Volodymyr Chen was seen today for care management. Diagnoses and all orders for this visit:    Chronic obstructive pulmonary disease with acute exacerbation (HCC)  -     CBC Auto Differential; Future  Medications reviewed. Side effects and benefits of all meds reviewed. Spoke with Dr Anita Bryant on the phone. Her oxygen sat on 3 litres here was in [de-identified]  Dr Anita Bryant advised to increase oxygen to 4 litres. On 4 litres her sat was more than 90 percent. Repeat oxygen sat on 4 litres was  96 percent. Anxiety  -     LORazepam (ATIVAN) 0.5 MG tablet; Take 1 tablet by mouth nightly for 30 days. .  -     CBC Auto Differential; Future  Pt has chronic anxiety and side effects and benefits of Lorazepam explained. Pneumonia of right lower lobe due to Pseudomonas species (HCC)  -     CBC Auto Differential; Future  Pt received IV antibiotics in hospital for the pneumonia. Dr Loren Bella has advised we repeat [place] xray in 2-3 weeks. Coronary artery disease involving coronary bypass graft of native heart without angina pectoris  -     COMPREHENSIVE METABOLIC PANEL; Future  Stable. All medications reviewed. All questions answered. Atrial fibrillation, unspecified type ([address]  -     COMPREHENSIVE METABOLIC PANEL; Future  Pt is on coumadin and is getting a PT/INR on Thursday and results will be reported to coumadin clinic. Side effects and benefits of coumadin use explained. Her condition was  Discussed in the room with her niece who was accompanying her. Medical Decision Making: moderate complexity  We reviewed the side effects and benefits of all medications in detail. Med reconcillation was done and explained each medication and diagnosis associated with its use. She will return to office in 6 weeks.

## 2018-07-12 NOTE — PROGRESS NOTES
[facility]: HOME CARE NOTE    Patient is currently receiving home care services via: [facility] [address]    Med list updated last on: requested today    INR today is therapeutic at 2.2, goal 2-3. INR reflects usual warfarin dose of 5 mg on Fri.; 2.5 mg all other days  Warfarin dosing orders: same    Orders given to:  Wendy Durant   Next INR ordered for:  7/19/18  Next INR will reflect: 20 mg weekly    Mirna Ames PharmD 7/12/2018 12:34 PM

## 2018-07-27 NOTE — TELEPHONE ENCOUNTER
Spoke to Megan Ovalle, patient's niece. Scheduled an appt @ [facility] for Wednesday 8/1/18 @ 3:30 PM. Asked her to have patient to continue taking 2.5 mg daily except 5 mg on Fridays. Her INR on 7/25 was 1.9 and she was supposed to take 5 mg that day, then resume 2.5 mg daily except for 5 mg on Friday. However, the [facility]. nurse did not call back with instructions until Thursday 7/26. So, patient took warfarin 2.5 mg on Wed 7/25, 5 mg on Thurs 7/26, and will take 5 mg today, THEN SHE WILL RESUME 2.5 mg daily except 5 mg on Fridays.  Electronically signed by Angella Law on 7/27/18 at 2:41 PM

## 2018-08-14 NOTE — TELEPHONE ENCOUNTER
Requested Prescriptions     Pending Prescriptions Disp Refills    LORazepam (ATIVAN) 0.5 MG tablet 30 tablet 0     Sig: Take 1 tablet by mouth nightly for 30 days. Rao Ag        Next appt is 8/22/2018  Last appt was 7/11/2018

## 2018-08-15 NOTE — TELEPHONE ENCOUNTER
Patient called today needing a refill on her Lorazepam.  She would not go through the prescription line and wanted me to let you know.

## 2018-08-15 NOTE — PROGRESS NOTES
[facility]    Patient Findings     Positives:   Signs/symptoms of bleeding (DID HAVE SOME BLEEDING ON HEAL OF RIGHT FOOT FROM EXERCISING BUT STOPPED BLEEDING LAST WEEK AND HAS BEEN USING NEOSPORIN. IT HAS HEELED PER PATIENT. PLANS TO SEE DOCTOR FOR THIS.), Change in activity (GOES TO REHAB AND IS EXERCISING MORE)    Negatives:   Signs/symptoms of thrombosis, Laboratory test error suspected, Change in health, Change in alcohol use, Upcoming invasive procedure, Emergency department visit, Upcoming dental procedure, Missed doses, Extra doses, Change in medications, Change in diet/appetite, Hospital admission, Bruising, Other complaints         Patient  reports that she quit smoking about 13 years ago. Her smoking use included Cigarettes. She started smoking about 66 years ago. She has a 40.00 pack-year smoking history. She has never used smokeless tobacco.     Assessment/Plan:  INR is SUPRAtherapeutic at 3.8, goal is 2.0-3.0  Warfarin Dose: HOLD DOSE FOR TODAY AND TOMORROW (8/15 AND 8/16) WILL RESUME NORMAL REGIMEN OF 5 MG ON Friday AND 2.5 MG ON ALL OTHER DAYS ON (8/17)  Follow Up: 1 week    PATIENTS INR HAS BEEN THERAPEUTIC 2 WEEKS AGO AND EVEN SUBTHERAPEUTIC WITHIN THE LAST MONTH, THUS WILL NOT CHANGE REGIMEN. WILL RATHER HOLD DOSE FOR THE NEXT 2 DAYS AND RESTART HER CURRENT REGIMEN ON 8/17. WILL THEN SEE PATIENT ON 8/24, IN ONE WEEK TO FOLLOW-UP PATIENTS INR AND REASSESS THERAPY. Patient understands dosing directions and information discussed. Dosing schedule and follow up appointment given to patient. Patient acknowledges working in consult agreement with pharmacist as referred by his/her physician.     Nilson Maria PharmD 8/15/2018 4:16 PM

## 2018-09-04 NOTE — TELEPHONE ENCOUNTER
Requested Prescriptions     Pending Prescriptions Disp Refills    metoprolol tartrate (LOPRESSOR) 50 MG tablet 180 tablet 3     Sig: Take 1 tablet by mouth 2 times daily       Next appt is 9/12/2018  Last appt was 7/11/2018

## 2018-09-06 NOTE — TELEPHONE ENCOUNTER
CLINICAL PHARMACY NOTE - Adherence Review    Per records, identified care gap per [employer] (patient insurance):   1. Lisinopril 2.5 mg appears last filled for 90 days on 04/19/2018  2. Atorvastatin 10 mg appears last filled for 90 days on 06/07/2018    Notes:   Per Reconcile Medication Dispenses in Care Path:   1. Lisinopril 2.5 mg last filled for 90 days on 4/19/18  2. Atorvastatin 10 mg last filled for 90 days on 9/3/18    Lisinopril 2.5mg was discontinued on 7/6/18 by Dr. Toni Combs per note. No patient outreach planned at this time. Pravin Rojo PharmD  PGY-1 Pharmacy Resident   9/6/2018  5:06 PM    I have discussed the care of this patient with the resident. I agree with the assessment and plan as documented. Kiana Padilla, PharmD, Leva Dials  Clinical Pharmacy Specialist  O: [phone]  C: [address] [place] [phone], [address]    =======================================================    For Pharmacy Admin Tracking Only  PHSO: Yes  Total # of Interventions Recommended: 2  - New Order #: 0 New Medication Order Reason(s):  Adherence  - Refills Provided #: 0  - Maintenance Safety Lab Monitoring #: 0  - New Therapy Lab Monitoring #: 0  Total Interventions Accepted: 0  Time Spent (min): 15

## 2018-09-12 NOTE — PATIENT INSTRUCTIONS
is it treated? Treatments can help you feel better and prevent future problems, especially stroke and heart failure. The main types of treatment slow the heart rate, control the heart rhythm, and help prevent stroke. Your treatment will depend on the cause of your atrial fibrillation, your symptoms, and your risk for stroke. · Heart rate treatment. Medicine may be used to slow your heart rate. Your heartbeat may still be irregular. But these medicines keep your heart from beating too fast. They may also help relieve your symptoms. · Heart rhythm treatment. Different treatments may be used to try to stop atrial fibrillation and keep it from returning. They can also relieve symptoms. These treatments include medicine, electrical cardioversion to shock the heart back to a normal rhythm, a procedure called catheter ablation, and heart surgery. · Stroke prevention. You and your doctor can decide how to lower your risk. You may decide to take a blood-thinning medicine, such as aspirin or an anticoagulant. How can you live well with it? You can live well and help manage atrial fibrillation by having a heart-healthy lifestyle. To have a heart-healthy lifestyle:  · Don't smoke. · Eat heart-healthy foods. · Be active. Talk to your doctor about what type and level of exercise is safe for you. · Stay at a healthy weight. Lose weight if you need to. · Manage stress. · Avoid alcohol if it triggers symptoms. · Manage other health problems such as high blood pressure, high cholesterol, and diabetes. · Avoid getting sick from the flu. Get a flu shot every year. Where can you learn more? Go to https://[email or web address] org and sign in to your Rowl account. Enter U417 in the PlexPress box to learn more about \"Learning About Atrial Fibrillation. \"     If you do not have an account, please click on the \"Sign Up Now\" link.   Current as of: December 6, 2017  Content Version: 11.7  © 7443-6085

## 2018-09-13 NOTE — PROGRESS NOTES
Results   Component Value Date    WBC 4.9 07/30/2018    RBC 3.31 07/30/2018    HGB 10.6 07/30/2018    HCT 34.5 07/30/2018    .2 07/30/2018    MCH 32.0 07/30/2018    MCHC 30.7 07/30/2018    RDW 14.7 07/30/2018     07/30/2018    MPV 9.6 07/30/2018     WBC:    Lab Results   Component Value Date    WBC 4.9 07/30/2018     Platelets:    Lab Results   Component Value Date     07/30/2018     CMP:    Lab Results   Component Value Date     07/30/2018    K 4.2 07/30/2018     07/30/2018    CO2 33 07/30/2018    BUN 28 07/30/2018    CREATININE 1.0 07/30/2018    GFRAA >60 07/30/2018    LABGLOM 53 07/30/2018    GLUCOSE 102 07/30/2018    GLUCOSE 95 03/09/2012    PROT 6.8 07/30/2018    LABALBU 3.9 07/30/2018    LABALBU 4.5 03/09/2012    CALCIUM 9.7 07/30/2018    BILITOT 0.5 07/30/2018    ALKPHOS 65 07/30/2018    AST 31 07/30/2018    ALT 25 07/30/2018     BUN/Creatinine:    Lab Results   Component Value Date    BUN 28 07/30/2018    CREATININE 1.0 07/30/2018     Ionized Calcium:  No results found for: IONCA  Magnesium:    Lab Results   Component Value Date    MG 1.3 09/22/2014      -----------------------------------------------------------------  EKG: Not indicated today. Assessment and Plan   Monico Deras was seen today for general check up. Diagnoses and all orders for this visit:    Coronary artery disease involving coronary bypass graft of native heart without angina pectoris  -     COMPREHENSIVE METABOLIC PANEL; Future  Stable. No chest pain. Breating is stable on oxygen. No cough and no blood in sputum. Anxiety  -     LORazepam (ATIVAN) 0.5 MG tablet; Take 1 tablet by mouth nightly for 30 days. .  -     CBC Auto Differential; Future  She is on a low dose of Ativan 0.5 mgm po daily for her anxiety. Need for influenza vaccination  -     INFLUENZA, HIGH DOSE, 65 YRS +, IM, PF, PREFILL SYR, 0.5ML (FLUZONE HD)  Side effects and benefits of Influenza vaccine explained.     Hypothyroidism, unspecified

## 2018-09-18 NOTE — PROGRESS NOTES
[facility] PHYSICIAN CARDIOLOGY   OFFICE VISIT        PRIMARY CARE PHYSICIAN:      Endeina Baird MD         ALLERGIES / SENSITIVITIES:      No Known Allergies       REVIEWED MEDICATIONS:       Current Outpatient Prescriptions   Medication Sig Dispense Refill    Fluticasone-Umeclidin-Vilant 100-62.5-25 MCG/INH AEPB Inhale into the lungs      LORazepam (ATIVAN) 0.5 MG tablet Take 1 tablet by mouth nightly for 30 days. . 30 tablet 0    metoprolol tartrate (LOPRESSOR) 50 MG tablet Take 1 tablet by mouth 2 times daily 180 tablet 3    Biotin 1000 MCG TABS Take 3,000 mcg by mouth daily      atorvastatin (LIPITOR) 10 MG tablet TAKE 1 TABLET BY MOUTH EVERY DAY 90 tablet 1    levothyroxine (SYNTHROID) 75 MCG tablet TAKE 1 TABLET BY MOUTH EVERY DAY 90 tablet 1    warfarin (COUMADIN) 2.5 MG tablet Take 2.5 mg by mouth Six times weekly Daily EXCEPT 2 tabs (5mg) Friday      ascorbic acid (VITAMIN C) 500 MG tablet Take 500 mg by mouth daily.  Coenzyme Q10 (CO Q 10) 100 MG CAPS Take 1 capsule by mouth daily       Fish Oil-Cholecalciferol (FISH OIL + D3) 8070-8707 MG-UNIT CAPS Take 1 capsule by mouth daily       calcium-vitamin D (OSCAL-500) 500-200 MG-UNIT per tablet Take 1 tablet by mouth daily.  aspirin 81 MG tablet Take 81 mg by mouth daily.  multivitamin (THERAGRAN) per tablet Take 1 tablet by mouth daily.  hydrochlorothiazide (HYDRODIURIL) 25 MG tablet Take 25 mg by mouth daily       No current facility-administered medications for this visit. S: REASON FOR VISIT: Management of paroxysmal atrial fibrillation, valvular heart disease and coronary artery disease. She also suffers from COPD and wears two liters of oxygen at night and as needed during the day. She has been attending pulmonary rehab, and feels that she is getting stronger. She denies chest discomfort, palpitations, or orthopnea.       REVIEW OF SYSTEMS:     Constitutional: denies fevers, chills, night sweats, or unusual regurgitation, moderate tricuspid regurgitation and moderate pulmonary hypertension on echocardiogram 4/2016   7. COPD     TREATMENT PLAN:    1. Continue current medications   2. We discussed rationale for continued statin use   3. She was encouraged to continue pulmonary rehab   4. We discussed symptoms of CHF to report   5. Return to see Dr. Juan Pena in 6 months    The patient's current medication list, allergies, problem list and results of all previously ordered tests were reviewed at today's visit    [address]  [facility] [address]  [phone]

## 2018-09-21 NOTE — PATIENT INSTRUCTIONS
especially good for you. Examples include spinach, carrots, peaches, and berries.     · Foods high in fiber can reduce your cholesterol and provide important vitamins and minerals. High-fiber foods include whole-grain cereals and breads, oatmeal, beans, brown rice, citrus fruits, and apples.     · Limit drinks and foods with added sugar. These include candy, desserts, and soda pop.    Lifestyle changes    · If your doctor recommends it, get more exercise. Walking is a good choice. Bit by bit, increase the amount you walk every day. Try for at least 30 minutes on most days of the week. You also may want to swim, bike, or do other activities.     · Do not smoke. If you need help quitting, talk to your doctor about stop-smoking programs and medicines. These can increase your chances of quitting for good. Quitting smoking may be the most important step you can take to protect your heart. It is never too late to quit. You will get health benefits right away.     · Limit alcohol to 2 drinks a day for men and 1 drink a day for women. Too much alcohol can cause health problems. Medicines    · Take your medicines exactly as prescribed. Call your doctor if you think you are having a problem with your medicine.     · If your doctor recommends aspirin, take the amount directed each day. Make sure you take aspirin and not another kind of pain reliever, such as acetaminophen (Tylenol). If you take ibuprofen (such as Advil or Motrin) for other problems, take aspirin at least 2 hours before taking ibuprofen. When should you call for help? Call 911 if you have symptoms of a heart attack.  These may include:    · Chest pain or pressure, or a strange feeling in the chest.     · Sweating.     · Shortness of breath.     · Pain, pressure, or a strange feeling in the back, neck, jaw, or upper belly or in one or both shoulders or arms.     · Lightheadedness or sudden weakness.     · A fast or irregular heartbeat.    After you call 911, the  may tell you to chew 1 adult-strength or 2 to 4 low-dose aspirin. Wait for an ambulance. Do not try to drive yourself.   Watch closely for changes in your health, and be sure to contact your doctor if you have any problems. Where can you learn more? Go to [email or web address] org and sign in to your [employer] account. Enter Q010 in the [employer] box to learn more about \"A Healthy Heart: Care Instructions. \"     If you do not have an account, please click on the \"Sign Up Now\" link. Current as of: December 6, 2017  Content Version: 11.7  © 9679-8635 [employer]. Care instructions adapted under license by [facility] ([facility]). If you have questions about a medical condition or this instruction, always ask your healthcare professional. [address] any warranty or liability for your use of this information.

## 2018-09-28 NOTE — PROGRESS NOTES
[id number] [facility]    Patient Findings     Negatives:   Signs/symptoms of thrombosis, Signs/symptoms of bleeding, Laboratory test error suspected, Change in health, Change in alcohol use, Change in activity, Upcoming invasive procedure, Emergency department visit, Upcoming dental procedure, Missed doses, Extra doses, Change in medications, Change in diet/appetite, Hospital admission, Bruising, Other complaints         Patient  reports that she quit smoking about 14 years ago. Her smoking use included Cigarettes. She started smoking about 67 years ago. She has a 40.00 pack-year smoking history. She has never used smokeless tobacco.     Assessment/Plan:  INR is SUBtherapeutic at 1.7, goal is 2-3. DOSE DECREASED LAST WEEK D/T INR OF 4.4. AFTER FURTHER DISCUSSION, SHE ADDED MORE VITAMIN K INTO DIET. Warfarin Dose: INCREASE (14%) BACK TO 5 MG ON FRI.; 2.5 MG ALL OTHER DAYS AND MAINTAIN CURRENT AMOUNT OF VITAMIN K IN DIET    Follow Up: 2 weeks    Patient understands dosing directions and information discussed. Dosing schedule and follow up appointment given to patient. Patient acknowledges working in consult agreement with pharmacist as referred by his/her physician.     Kristin Herman PharmD 9/28/2018 3:35 PM

## 2018-10-12 NOTE — TELEPHONE ENCOUNTER
Requested Prescriptions     Pending Prescriptions Disp Refills    LORazepam (ATIVAN) 0.5 MG tablet 30 tablet 0     Sig: Take 1 tablet by mouth nightly for 30 days. Maribell Rivas        Next appt is 12/19/2018  Last appt was 9/12/2018

## 2018-12-06 PROBLEM — S06.5XAA SDH (SUBDURAL HEMATOMA): Status: ACTIVE | Noted: 2018-01-01

## 2018-12-06 NOTE — ED PROVIDER NOTES
Department of Emergency Medicine   ED  Provider Note  Admit Date/RoomTime: 12/5/2018 10:03 PM  ED Room: 05/05          History of Present Illness:  12/5/18, Time: 10:03 PM         Eliana Hernández is a 80 y.o. female presenting to the ED for fall, beginning prior to arrival.  The complaint has been constant, moderate in severity, and worsened by nothing. Pt arrived via EMS. The pt reports tripping on her oxygen tubing causing her to fall and hit the pavon of her head. No LOC. Pt is on blood thinners. She denies any weakness, numbness, dizziness, neck pain, chest pain, sob, abdominal pain, nausea, emesis, or any further complaints. Review of Systems:   Pertinent positives and negatives are stated within HPI, all other systems reviewed and are negative.      --------------------------------------------- PAST HISTORY ---------------------------------------------  Past Medical History:  has a past medical history of Acute MI, inferior wall ([address] CAD (coronary artery disease); Cardiogenic shock ([facility] CHB (complete heart block) ([facility] Dermatitis; Hypercholesterolemia; Hyperlipidemia; Hypertension; Hypothyroidism; Paroxysmal atrial fibrillation ([facility] [address] Pneumonia; Shingles; Thyroid disease; and Warfarin-induced coagulopathy ([facility] [address] [facility] Past Surgical History:  has a past surgical history that includes angioplasty (2004); Appendectomy; Diagnostic Cardiac Cath Lab Procedure (1/9/2004); Diagnostic Cardiac Cath Lab Procedure (1/12/2--4); Coronary angioplasty with stent (1/2009); thrombectomy; Hemorrhoid surgery; Upper gastrointestinal endoscopy (8/2004); Total hip arthroplasty (7/30/2005 Right.); Cardiac pacemaker placement (Left, 01/23/2004); ECHO Compl W Dop Color Flow (5/2/2013); and Colonoscopy (04/06/2011). Social History:  reports that she quit smoking about 14 years ago. Her smoking use included Cigarettes. She started smoking about 67 years ago. She has a 40.00 pack-year smoking history.  She has never used smokeless tobacco. She reports that she does not drink alcohol or use drugs. Family History: family history includes High Blood Pressure in her mother; Stroke in her mother. The patients home medications have been reviewed. Allergies: Patient has no known allergies. ---------------------------------------------------PHYSICAL EXAM--------------------------------------    Constitutional/General: Alert and oriented x3, well appearing, non toxic in NAD  Head: Normocephalic. Contusion to left occipital scalp. Eyes: PERRL, EOMI, conjunctiva normal  Mouth: Oropharynx clear, handling secretions, no trismus, no asymmetry of the posterior oropharynx or uvular edema  Neck: Supple, full ROM, non tender to palpation in the midline, no stridor,  Back: No thoracic or lumbar midline tenderness  Respiratory: Lungs clear to auscultation bilaterally, no wheezes, rales, or rhonchi. Not in respiratory distress  Cardiovascular:  Regular rate. Regular rhythm. No murmurs, gallops, or rubs. 2+ distal pulses  Chest: No chest wall tenderness  GI:  Abdomen Soft, Non tender, Non distended. +BS. No rebound, guarding, or rigidity. No pulsatile masses. Musculoskeletal: Moves all extremities x 4. Warm and well perfused  Integument: skin warm and dry. No rashes. Neurologic: GCS 15, no focal deficits  Psychiatric: Normal Affect      -------------------------------------------------- RESULTS -------------------------------------------------  I have personally reviewed all laboratory and imaging results for this patient. Results are listed below.      LABS:  Results for orders placed or performed during the hospital encounter of 12/05/18   CBC   Result Value Ref Range    WBC 4.8 4.5 - 11.5 E9/L    RBC 3.31 (L) 3.50 - 5.50 E12/L    Hemoglobin 9.4 (L) 11.5 - 15.5 g/dL    Hematocrit 30.8 (L) 34.0 - 48.0 %    MCV 93.1 80.0 - 99.9 fL    MCH 28.4 26.0 - 35.0 pg    MCHC 30.5 (L) 32.0 - 34.5 %    RDW 16.0 (H) 11.5 - 15.0 fL    Platelets

## 2018-12-06 NOTE — CONSULTS
female, appearing her stated  age, in no acute distress. NEUROLOGIC:  Cranial nerves II through XII are grossly intact. SKIN:  Warm and dry. LUNGS:  No respiratory distress. ABDOMEN:  No significant abdominal distention. EXTREMITIES:  She is moving all extremities well with no obvious focal  motor or sensory deficits. IMPRESSION:  An 25-year-old female who had a fall yesterday while in her  kitchen. Head CT demonstrated small left parietal subdural hematoma  measuring 8 mm in maximum thickness, stable on follow up CAT scan. PLAN:  No anticoagulation. Coumadin should be reversed and the INR  should be within normal limits. No surgical intervention at this time. We will see the patient back in the office in four weeks with a repeat  CAT scan, serial neurological examinations while here in the hospital.        Brayan Casper, [address]    D: 12/06/2018 7:57:48       T: 12/06/2018 9:17:45     [email or web address]  Job#: [id number]     Doc#: [id number]    CC:    I have interviewed and examined the patient and agree with above. No anticoagulation or antiplatlet therapy.   Follow up in 4 weeks    Maria Eugenia Cornejo

## 2018-12-06 NOTE — PROGRESS NOTES
present. Bones/joints:  No acute calvarial fracture. Sinuses: Normal as visualized. No acute sinusitis. Mastoid air cells: Normal as visualized. No mastoid effusion. Soft tissues:  Posterior left scalp swelling. Vasculature:  Arterial atheromatous vascular calcifications present. 1. Small acute posterior left parietal subdural hematoma. 2. Remainder of findings as above. This report has been electronically signed by Alida Head MD.    Ct Cervical Spine Wo Contrast    Result Date: 12/5/2018  Initial report created on 12/5/2018 11:50:26 PM EST EXAM:  CT Cervical Spine Without Intravenous Contrast EXAM DATE/TIME:  12/5/2018 10:15 PM CLINICAL HISTORY:  80years old, female; Injury or trauma; Fall; Initial encounter; Blunt trauma TECHNIQUE:  Axial computed tomography images of the cervical spine without intravenous contrast.  All CT scans at this facility use at least one of these dose optimization techniques: automated exposure control; mA and/or kV adjustment per patient size (includes targeted exams where dose is matched to clinical indication); or iterative reconstruction. Coronal and sagittal reformatted images were created and reviewed. COMPARISON:  No relevant prior studies available. FINDINGS:  Vertebrae:  Minimal degenerative anterolisthesis from C4-C6. Multilevel degenerative changes present within spine. No acute cervical spine fracture. No acute posttraumatic subluxation. Osteopenia. Left C4 facet bone island. Discs/Spinal canal/Neural foramina:  No significant focal disc herniation. Prevertebral Space:  Unremarkable prevertebral soft tissues. Soft tissues:  No neoplastic osseous lesion present. Lungs: There are advanced centrilobular emphysematous changes present. Pleural space:  No pneumothorax. 1. No acute fracture. 2. Remainder of findings as above.  This report has been electronically signed by Alida Head MD.      PHYSICAL EXAM:   GCS:  4 - Opens eyes on own   6 - Follows simple

## 2018-12-06 NOTE — H&P
findings of this report, which may be critical to patient care, were subsequently read aloud by  to Dr. Mabel Schuster at 11:47 PM EST on 12/5/2018, who then verbally acknowledged an understanding of the findings, which may be critical to patient care. This addendum has been electronically signed by Mehreen Jacques MD.    Result Date: 12/5/2018  Initial report created on 12/5/2018 11:44:18 PM EST EXAM:  CT Head Without Contrast EXAM DATE/TIME:  12/5/2018 10:15 PM CLINICAL HISTORY:  80years old, female; Injury or trauma; Fall; Additional info: Head injury TECHNIQUE:  Axial computed tomography images of the head/brain without contrast.  All CT scans at this facility use at least one of these dose optimization techniques: automated exposure control; mA and/or kV adjustment per patient size (includes targeted exams where dose is matched to clinical indication); or iterative reconstruction. Coronal and sagittal reformatted images were created and reviewed. COMPARISON:  CTB HEAD W/O CONTRAST 9/19/2014 3:18 PM FINDINGS:  Brain:  Generalized parenchymal atrophy present which is compatible with patient age. Chronic ischemic gliotic white matter changes present. No acute subarachnoid, parenchymal or intraventricular hemorrhage. 8mm posterior left parietal acute subdural hematoma. No definite acute infarct. Midline shift:  No abnormal mass lesion or midline shift. Ventricles:  Atrophy related ventriculomegaly/ex vacuo dilatation present. Bones/joints:  No acute calvarial fracture. Sinuses: Normal as visualized. No acute sinusitis. Mastoid air cells: Normal as visualized. No mastoid effusion. Soft tissues:  Posterior left scalp swelling. Vasculature:  Arterial atheromatous vascular calcifications present. 1. Small acute posterior left parietal subdural hematoma. 2. Remainder of findings as above.  This report has been electronically signed by Mehreen Jacques MD.    Ct Cervical Spine Wo Contrast    Result Date:

## 2018-12-07 NOTE — PROGRESS NOTES
SPEECH/LANGUAGE PATHOLOGY  SPEECH/LANGUAGE/COGNITIVE EVALUATION    PATIENT NAME:  Elizabeth Johnston      :  1934      TODAY'S DATE:  2018      SPEECH PATHOLOGY DIAGNOSIS:  WFL    THERAPY RECOMMENDATIONS:   [x]Speech Pathology intervention is not warranted at this time.    []Speech Pathology intervention is recommended with emphasis on the following:                  MOTOR SPEECH       Oral Peripheral Examination   [x]Adequate lingual/labial strength   []Generalized oral weakness   []Right labiobuccal weakness   []Left labiobuccal weakness     []Right lingual deviation    []Left lingual deviation   []Inadequate velopharyngeal closure  []Oral apraxia       []CNT    Parameters of Speech Production  Respiration:  [x]WFL []Inadequate for speech production  Articulation:  [x]WFL []Distortions []Anticipatory struggle []CNT  Resonance:  [x]WFL []Hypernasal  []Hyponasal  []Nasal emission []CNT  Quality:   [x]WFL []Hoarse []Harsh []Strained [] Breathiness []CNT  Pitch:    [x]WFL []High []Low []CNT  Intensity: [x]WFL []Loud []Quiet []CNT  Fluency:  [x]Intact []Dysfluent []CNT  Prosody [x]Intact []Monotone []Irregular fluctuation      RECEPTIVE LANGUAGE    Comprehension of Yes/No Questions:   [x]WNL []Incomplete []Latent  []Inconsistent []Perseveration []Cueing  []Unable []CNT    Process  Simple Verbal Commands:   [x]WNL []Incomplete []Latent  []Inconsistent []Perseveration []Cueing  []Unable []CNT  Process Intermediate Verbal Commands:   [x]WNL []Incomplete []Latent  []Inconsistent []Perseveration []Cueing  []Unable []CNT  Process Complex Verbal Commands:   [x]WNL []Incomplete []Latent  []Inconsistent []Perseveration []Cueing  []Unable []CNT    Comprehension of Conversation:     [x]WNL []Incomplete []Latent  []Inconsistent []Perseveration []Cueing  []Unable []CNT       EXPRESSIVE LANGUAGE     Serials: ([x]Functional[] Impaired)    Imitation:  Words  ([x]Functional[] Impaired)    Sentences ([x]Functional[] [facility]) [M94.5X3M]     ACTIVE PROBLEM LIST:   Patient Active Problem List   Diagnosis    Hypercholesterolemia    [facility]    Coronary artery disease    Hypothyroidism    Hypoxemia    Underweight    Bronchitis, acute    Pulmonary hypertension [facility] [address] [facility]    Mitral regurgitation    History of ST elevation myocardial infarction (STEMI)    CHB (complete heart block) (HCC)    Paroxysmal atrial fibrillation    Hypertension    Hyperlipidemia    Pneumonia    SDH (subdural hematoma) [facility]    Head injury

## 2018-12-07 NOTE — PROGRESS NOTES
(SYNTHROID) tablet 75 mcg, 75 mcg, Oral, QAM AC  metoprolol tartrate (LOPRESSOR) tablet 50 mg, 50 mg, Oral, BID  sodium chloride flush 0.9 % injection 10 mL, 10 mL, Intravenous, 2 times per day  sodium chloride flush 0.9 % injection 10 mL, 10 mL, Intravenous, PRN  acetaminophen (TYLENOL) tablet 650 mg, 650 mg, Oral, Q4H PRN  magnesium hydroxide (MILK OF MAGNESIA) 400 MG/5ML suspension 30 mL, 30 mL, Oral, Daily PRN  ondansetron (ZOFRAN) injection 4 mg, 4 mg, Intravenous, Q6H PRN  levETIRAcetam (KEPPRA) tablet 500 mg, 500 mg, Oral, BID    ASSESSMENT:   SDH     PLAN:  -No neurosurgerical intervention needed at this time  -No anticoagulation or antiplatelet agents until hemorrhage is completely resolved.    -Follow up in neurosurgery clinic in 4 weeks with repeat head CT scan       Electronically signed by Gabriella Tapia PA-C on 12/7/2018 at 1:19 PM

## 2018-12-07 NOTE — PROGRESS NOTES
Treatment:      -- At end of session, pt was properly positioned in semi-supine with all devices within reach, all lines and tubes intact. Oriented pt to call bell. Bed Alarm activated. Made all appropriate Environmental Modifications to facilitate pt's level of IND and safety. All needs met. -- Education:  Provided Pt/Family ed re: Benefits/Purpose of OT services;  OT Plan of Care;  Transfer Safety, Walker safety; Benefits of use of DME/AD/Adaptive equip/techs to increase safety/IND with Functional Ax; Techs to increase Safety/Safety Awareness w/ Functional Ax; Energy Conservation Techs/Pursed-Lip Breathing;  Benefits of Cont'd Participation in OT services at D/C      Pt and/or Family verbalized/demonstrated a good understanding of education provided. Will Review PRN. Provided Skilled SUP/Assist w/ Pt safety, Proper Positioning, ADLs, Transfers and Functional Mobility as noted above, as well as set up and clean up for session. Skilled monitoring of Vitals and pts response to treatment. Pt would benefit from continued skilled OT to increase level of safety and Sebastian with ADLs, Functional Transfers and Functional Mobility in order to improve quality of life.       Eval Complexity: Moderate     Assessment of current deficits   Functional mobility [x]  ADLs [x] Strength []  Cognition []  Functional transfers  [x] IADLs [x] Safety Awareness [x]  Endurance [x]  Fine Motor Coordination [] Balance [] Vision/perception [] Sensation []   Gross Motor Coordination [] ROM [] Delirium []                  Motor Control []    Plan of Care:   ADL retraining [x]   Equipment needs [x]   Neuromuscular re-education [x] Energy Conservation Techniques [x]  Functional Transfer training [x] Patient and/or Family Education [x]  Functional Mobility training [x]  Environmental Modifications [x]  Cognitive re-training [x]   Compensatory techniques for ADLs [x]  Splinting Needs

## 2018-12-07 NOTE — CARE COORDINATION
CM met with patient at bedside to discuss transition of care. Pt currently lives in a 1 story home with her niece and sister. Pt's bed and bath are on the 1st floor. She has home o2 and nebulizer from pta (through [facility]). She also uses a cane from pta. Therapy eval reviewed. We discussed hhc, but pt declines this service at present. We also discussed recommendation for a wheeled walker. Pt is agreeable to this and would like to use [facility]. Referral sent to [facility] to arrange walker. Patient/family will have to  wheeled walker at local [facility] building.   Per patient, her niece will provide her with homegoing transportation

## 2018-12-07 NOTE — PLAN OF CARE
Patient had a mechanical fall at home resulting in a subdural hematoma. She saw PT in the hospital whom recommended that patient can return to home with good mobility. She does require a wheeling walker for increased mobility at home.       Electronically signed by Liana Reyes DO on 12/7/2018 at 12:49 PM

## 2018-12-07 NOTE — PROGRESS NOTES
Physical Therapy  Initial Assessment     Name: Pedro Clemens  : 1934  MRN: [id number]    Date of Service: 2018    Evaluating PT:  Katelin Tidwell, PT, DPT, [id number]    Room #:  2172/5396-U  Diagnosis:  SDH  Precautions:  Falls, O2  Equipment Needs:  [employer] recommended    Pt lives with niece and sister in a 2 story home with 2 stair(s) to enter and 1 rail(s). Bed is on 1 floor and bath is on 1 floor. Pt ambulated with cane PTA -- owns [employer] but says it is too wide for her. Pt independent for ADL performance. Initial Evaluation  Date: 18 Treatment Short Term/ Long Term   Goals   AM-PAC 6 Clicks      Was pt agreeable to Eval/treatment? yes     Does pt have pain? denies     Bed Mobility  Rolling: NT  Supine to sit: NT  Sit to supine: NT  Scooting: NT  --seated in bedside chair  Independent   Transfers Sit to stand: SBA  Stand to sit: SBA  Stand pivot: NT  Independent   Ambulation    30 feet with no AD CGA  And  75 feet with WW SBA  >200 feet with AAD mod I   Stair negotiation: ascended and descended  NT  2 steps with 1 rail SBA   ROM BUE:  See OT eval  BLE:  WFL     Strength BUE:  See OT eval  BLE grossly:  4/5  5/5   Balance Sitting EOB:  NT  Dynamic Standing:  SBA/CGA  Sitting EOB:  Independent  Dynamic Standing: Mod I with AAD     Pt is A & O x 3  Sensation:  Pt denies numbness and tingling to extremities  Edema:  unremarkable    Patient education  Pt educated on safety, sequencing during transfers, and role of PT     Patient response to education:   Pt verbalized understanding Pt demonstrated skill Pt requires further education in this area   yes yes reinforce     Assessment/Comments    Pt seated in bedside chair upon entry to room. Agreeable to PT evaluation. Pt on 2 L O2 which she reports is her baseline at home. Stood from chair with no difficulty. Ambulated to penn with no AD, mildly unsteady, hands on assist to steady.  Provided [employer], gait much improved, steady, requiring no assist. Limited distance d/t SOB. Returned to room, O2 sat 88%, quickly recovered to >90% in sitting after 10 seconds. Pt aware of deep breathing techniques. Educated on recommendation for use of WW once home as she reported she uses cane but doesn't feel safe with it. Pt receptive and felt the [employer] helped her. Pt with all needs met and call light within reach. Pt will benefit from further skilled PT to address functional deficits described above. Pts/ family goals   1. Return home     Patient and or family understand(s) diagnosis, prognosis, and plan of care. Yes     PLAN  PT care will be provided in accordance with the objectives noted above. Whenever appropriate, clear delegation orders will be provided for nursing staff. Exercises and functional mobility practice will be used as well as appropriate assistive devices or modalities to obtain goals. Patient and family education will also be administered as needed. Frequency of treatments: 2-5x/week x 7-10 days.     Time in  0945  Time out  [address], [place], [place]  [id number]

## 2018-12-07 NOTE — PROGRESS NOTES
Daily Trauma Progress Note 12/7/2018      Admit Date: 12/5/2018      Mechanism of Injury: Fall SH    INJURIES:   Patient Active Problem List   Diagnosis    Hypercholesterolemia    [facility]    Coronary artery disease    Hypothyroidism    Hypoxemia    Underweight    Bronchitis, acute    Pulmonary hypertension [facility] [facility]    Mitral regurgitation    History of ST elevation myocardial infarction (STEMI)    CHB (complete heart block) (HCC)    Paroxysmal atrial fibrillation    Hypertension    Hyperlipidemia    Pneumonia    SDH [facility]subdural hematoma) [facility] [address] [facility]    Head injury       PREVIOUS 24 HOUR EVENTS: no acute events    Subjective:     Patient doing well with complaint of mild back pain from bed. Patient tolerating diet, passing flatus and had a bowel movement. Patient eager to go home. Objective:   Patient Vitals for the past 8 hrs:   BP Temp Temp src Pulse Resp SpO2   12/07/18 0017 (!) 115/54 98.5 °F (36.9 °C) Oral 80 18 96 %     I/O last 3 completed shifts: In: 832.8 [P.O.:250; Blood:582.8]  Out: 350 [Urine:350]  No intake/output data recorded. PHYSICAL:  Pain Description: mild  GCS:    4 - Opens eyes on own   6 - Follows simple motor commands  5 - Alert and oriented    Pupil size:  Left 3 mm    Right 3 mm    Pupil reaction: Yes    Wiggles fingers: Left Yes Right Yes    Hand grasp:   Left normal       Right normal    Wiggles toes: Left Yes    Right Yes    Plantar flexion: Left normal     Right normal       General appearance: alert, cooperative and in no acute distress. Mild tenderness to occipital scalp without significant cephalohematoma  Eyes: grossly normal  Lungs: clear to auscultation bilaterally  Heart: regular rate and rhythm  Abdomen: soft, non-tender, non distended, no masses,  no organomegaly  Skin: No skin abnormalities  Neurologic: Alert and oriented x 3.  Grossly normal  Musculoskeletal: No clubbing cyanosis or edema.     CONSULTS:

## 2018-12-13 NOTE — DISCHARGE SUMMARY
95 03/09/2012    LABGLOM >60 12/07/2018    PROTIME 13.0 12/07/2018    INR 1.1 12/07/2018    LABALBU 3.2 12/07/2018    LABALBU 4.5 03/09/2012    PROT 6.3 12/07/2018    CALCIUM 9.0 12/07/2018    MG 1.4 12/07/2018    BILITOT 0.5 12/07/2018    BILIDIR <0.2 09/19/2014    ALKPHOS 82 12/07/2018    AST 29 12/07/2018    ALT 20 12/07/2018       Discharge Exam:   VITALS: /61   Pulse 72   Temp 96.5 °F (35.8 °C) (Temporal)   Resp 16   Ht 5' (1.524 m)   Wt 94 lb (42.6 kg)   SpO2 95%   BMI 18.36 kg/m²     General appearance: alert, appears stated age and cooperative  Head: Normocephalic, without obvious abnormality, atraumatic  Neck: no adenopathy, no carotid bruit, no JVD, supple, symmetrical, trachea midline and thyroid not enlarged, symmetric, no tenderness/mass/nodules  Lungs: clear to auscultation bilaterally  Heart: regular rate and rhythm, S1, S2 normal, no murmur, click, rub or gallop  Abdomen: soft, non-tender; bowel sounds normal; no masses,  no organomegaly  Extremities: extremities normal, atraumatic, no cyanosis or edema    Disposition: home       Medication List      START taking these medications    levETIRAcetam 500 MG tablet  Commonly known as:  KEPPRA  Take 1 tablet by mouth 2 times daily        CONTINUE taking these medications    ascorbic acid 500 MG tablet  Commonly known as:  VITAMIN C     atorvastatin 10 MG tablet  Commonly known as:  LIPITOR  TAKE 1 TABLET BY MOUTH EVERY DAY     Biotin 1000 MCG Tabs     calcium-vitamin D 500-200 MG-UNIT per tablet  Commonly known as:  OSCAL-500     Co Q 10 100 MG Caps     FISH OIL + D3 8058-1035 MG-UNIT Caps     Fluticasone-Umeclidin-Vilant 100-62.5-25 MCG/INH Aepb     hydrochlorothiazide 25 MG tablet  Commonly known as:  HYDRODIURIL  TAKE 1 TABLET BY MOUTH DAILY     levothyroxine 75 MCG tablet  Commonly known as:  SYNTHROID  TAKE 1 TABLET BY MOUTH EVERY DAY     LORazepam 0.5 MG tablet  Commonly known as:  ATIVAN  Take 1 tablet by mouth nightly for 30 days. [place] Churn

## 2018-12-17 NOTE — TELEPHONE ENCOUNTER
Spoke to [place]. Patient is currently off of warfarin until she follows up with doctor on 1/7/19. She would like to know if patient is still allowed to take CoQ10? Thank you.  Electronically signed by Dora Love on 12/17/18 at 1:18 PM

## 2019-01-01 ENCOUNTER — APPOINTMENT (OUTPATIENT)
Dept: GENERAL RADIOLOGY | Age: 84
DRG: 207 | End: 2019-01-01
Payer: MEDICARE

## 2019-01-01 ENCOUNTER — TELEPHONE (OUTPATIENT)
Dept: FAMILY MEDICINE CLINIC | Age: 84
End: 2019-01-01

## 2019-01-01 ENCOUNTER — APPOINTMENT (OUTPATIENT)
Dept: ULTRASOUND IMAGING | Age: 84
DRG: 189 | End: 2019-01-01
Payer: MEDICARE

## 2019-01-01 ENCOUNTER — ANESTHESIA (OUTPATIENT)
Dept: ENDOSCOPY | Age: 84
DRG: 207 | End: 2019-01-01
Payer: MEDICARE

## 2019-01-01 ENCOUNTER — CARE COORDINATION (OUTPATIENT)
Dept: CASE MANAGEMENT | Age: 84
End: 2019-01-01

## 2019-01-01 ENCOUNTER — APPOINTMENT (OUTPATIENT)
Dept: GENERAL RADIOLOGY | Age: 84
DRG: 189 | End: 2019-01-01
Payer: MEDICARE

## 2019-01-01 ENCOUNTER — HOSPITAL ENCOUNTER (OUTPATIENT)
Dept: CT IMAGING | Age: 84
Discharge: HOME | End: 2019-01-07
Payer: MEDICARE

## 2019-01-01 ENCOUNTER — OFFICE VISIT (OUTPATIENT)
Dept: FAMILY MEDICINE CLINIC | Age: 84
End: 2019-01-01
Payer: MEDICARE

## 2019-01-01 ENCOUNTER — ANESTHESIA EVENT (OUTPATIENT)
Dept: ENDOSCOPY | Age: 84
DRG: 207 | End: 2019-01-01
Payer: MEDICARE

## 2019-01-01 ENCOUNTER — APPOINTMENT (OUTPATIENT)
Dept: CT IMAGING | Age: 84
DRG: 189 | End: 2019-01-01
Payer: MEDICARE

## 2019-01-01 ENCOUNTER — HOSPITAL ENCOUNTER (INPATIENT)
Age: 84
LOS: 16 days | Discharge: NURSING, REHAB OR LONG-TERM CARE | DRG: 207 | End: 2019-04-08
Attending: EMERGENCY MEDICINE | Admitting: INTERNAL MEDICINE
Payer: MEDICARE

## 2019-01-01 ENCOUNTER — APPOINTMENT (OUTPATIENT)
Dept: GENERAL RADIOLOGY | Age: 84
End: 2019-01-01

## 2019-01-01 ENCOUNTER — HOSPITAL ENCOUNTER (INPATIENT)
Age: 84
LOS: 4 days | Discharge: NURSING, REHAB OR LONG-TERM CARE | DRG: 189 | End: 2019-01-15
Attending: EMERGENCY MEDICINE | Admitting: INTERNAL MEDICINE
Payer: MEDICARE

## 2019-01-01 ENCOUNTER — OFFICE VISIT (OUTPATIENT)
Dept: CARDIOLOGY CLINIC | Age: 84
End: 2019-01-01
Payer: MEDICARE

## 2019-01-01 VITALS
TEMPERATURE: 98.9 F | RESPIRATION RATE: 16 BRPM | SYSTOLIC BLOOD PRESSURE: 138 MMHG | WEIGHT: 93.2 LBS | HEART RATE: 75 BPM | HEIGHT: 60 IN | OXYGEN SATURATION: 100 % | BODY MASS INDEX: 18.3 KG/M2 | DIASTOLIC BLOOD PRESSURE: 57 MMHG

## 2019-01-01 VITALS
HEIGHT: 60 IN | WEIGHT: 88.8 LBS | SYSTOLIC BLOOD PRESSURE: 127 MMHG | RESPIRATION RATE: 18 BRPM | OXYGEN SATURATION: 95 % | DIASTOLIC BLOOD PRESSURE: 72 MMHG | TEMPERATURE: 98.4 F | HEART RATE: 75 BPM | BODY MASS INDEX: 17.43 KG/M2

## 2019-01-01 VITALS
WEIGHT: 91 LBS | SYSTOLIC BLOOD PRESSURE: 120 MMHG | RESPIRATION RATE: 18 BRPM | HEIGHT: 60 IN | BODY MASS INDEX: 17.87 KG/M2 | DIASTOLIC BLOOD PRESSURE: 66 MMHG | HEART RATE: 69 BPM

## 2019-01-01 VITALS
SYSTOLIC BLOOD PRESSURE: 106 MMHG | RESPIRATION RATE: 18 BRPM | TEMPERATURE: 98.1 F | HEART RATE: 73 BPM | WEIGHT: 92 LBS | HEIGHT: 60 IN | DIASTOLIC BLOOD PRESSURE: 70 MMHG | BODY MASS INDEX: 18.06 KG/M2

## 2019-01-01 VITALS — OXYGEN SATURATION: 97 % | SYSTOLIC BLOOD PRESSURE: 86 MMHG | DIASTOLIC BLOOD PRESSURE: 39 MMHG

## 2019-01-01 DIAGNOSIS — J90 PLEURAL EFFUSION: ICD-10-CM

## 2019-01-01 DIAGNOSIS — J44.9 CHRONIC OBSTRUCTIVE PULMONARY DISEASE, UNSPECIFIED COPD TYPE (HCC): ICD-10-CM

## 2019-01-01 DIAGNOSIS — F41.9 ANXIETY: ICD-10-CM

## 2019-01-01 DIAGNOSIS — J18.9 PNEUMONIA DUE TO ORGANISM: Primary | ICD-10-CM

## 2019-01-01 DIAGNOSIS — I48.0 PAROXYSMAL ATRIAL FIBRILLATION (HCC): ICD-10-CM

## 2019-01-01 DIAGNOSIS — I25.810 CORONARY ARTERY DISEASE INVOLVING CORONARY BYPASS GRAFT OF NATIVE HEART WITHOUT ANGINA PECTORIS: Primary | Chronic | ICD-10-CM

## 2019-01-01 DIAGNOSIS — J44.1 COPD WITH ACUTE EXACERBATION (HCC): ICD-10-CM

## 2019-01-01 DIAGNOSIS — J96.01 ACUTE RESPIRATORY FAILURE WITH HYPOXIA (HCC): ICD-10-CM

## 2019-01-01 DIAGNOSIS — D64.9 ANEMIA, UNSPECIFIED TYPE: ICD-10-CM

## 2019-01-01 DIAGNOSIS — Z99.81 ON HOME O2: ICD-10-CM

## 2019-01-01 DIAGNOSIS — I48.0 PAROXYSMAL ATRIAL FIBRILLATION (HCC): Primary | Chronic | ICD-10-CM

## 2019-01-01 DIAGNOSIS — I38 VHD (VALVULAR HEART DISEASE): ICD-10-CM

## 2019-01-01 DIAGNOSIS — E03.9 HYPOTHYROIDISM, UNSPECIFIED TYPE: ICD-10-CM

## 2019-01-01 DIAGNOSIS — S06.5XAA SDH (SUBDURAL HEMATOMA): ICD-10-CM

## 2019-01-01 DIAGNOSIS — J90 BILATERAL PLEURAL EFFUSION: Primary | ICD-10-CM

## 2019-01-01 DIAGNOSIS — Z95.0 STATUS POST PLACEMENT OF CARDIAC PACEMAKER: ICD-10-CM

## 2019-01-01 DIAGNOSIS — I25.10 CORONARY ARTERY DISEASE INVOLVING NATIVE CORONARY ARTERY OF NATIVE HEART WITHOUT ANGINA PECTORIS: ICD-10-CM

## 2019-01-01 DIAGNOSIS — Z46.59 ENCOUNTER FOR NASOGASTRIC (NG) TUBE PLACEMENT: ICD-10-CM

## 2019-01-01 DIAGNOSIS — I10 HTN (HYPERTENSION), BENIGN: Chronic | ICD-10-CM

## 2019-01-01 DIAGNOSIS — R09.02 HYPOXIA: ICD-10-CM

## 2019-01-01 DIAGNOSIS — J96.00 ACUTE RESPIRATORY FAILURE, UNSPECIFIED WHETHER WITH HYPOXIA OR HYPERCAPNIA (HCC): ICD-10-CM

## 2019-01-01 LAB
AADO2: 108.6 MMHG
AADO2: 123 MMHG
AADO2: 125.3 MMHG
AADO2: 136.3 MMHG
AADO2: 136.6 MMHG
AADO2: 14.8 MMHG
AADO2: 169.2 MMHG
AADO2: 71.6 MMHG
AADO2: 71.8 MMHG
AADO2: 73.2 MMHG
AADO2: 73.3 MMHG
AADO2: 80.6 MMHG
AADO2: 99 MMHG
ABO/RH: NORMAL
ABO/RH: NORMAL
ALBUMIN SERPL-MCNC: 2.8 G/DL (ref 3.5–5.2)
ALBUMIN SERPL-MCNC: 2.9 G/DL (ref 3.5–5.2)
ALBUMIN SERPL-MCNC: 3 G/DL (ref 3.5–5.2)
ALBUMIN SERPL-MCNC: 3.1 G/DL (ref 3.5–5.2)
ALBUMIN SERPL-MCNC: 3.1 G/DL (ref 3.5–5.2)
ALBUMIN SERPL-MCNC: 3.2 G/DL (ref 3.5–5.2)
ALBUMIN SERPL-MCNC: 3.2 G/DL (ref 3.5–5.2)
ALBUMIN SERPL-MCNC: 3.3 G/DL (ref 3.5–5.2)
ALBUMIN SERPL-MCNC: 3.4 G/DL (ref 3.5–5.2)
ALBUMIN SERPL-MCNC: 3.4 G/DL (ref 3.5–5.2)
ALBUMIN SERPL-MCNC: 3.5 G/DL (ref 3.5–5.2)
ALP BLD-CCNC: 54 U/L (ref 35–104)
ALP BLD-CCNC: 58 U/L (ref 35–104)
ALP BLD-CCNC: 58 U/L (ref 35–104)
ALP BLD-CCNC: 59 U/L (ref 35–104)
ALP BLD-CCNC: 62 U/L (ref 35–104)
ALP BLD-CCNC: 64 U/L (ref 35–104)
ALP BLD-CCNC: 66 U/L (ref 35–104)
ALP BLD-CCNC: 68 U/L (ref 35–104)
ALP BLD-CCNC: 72 U/L (ref 35–104)
ALP BLD-CCNC: 73 U/L (ref 35–104)
ALP BLD-CCNC: 74 U/L (ref 35–104)
ALP BLD-CCNC: 77 U/L (ref 35–104)
ALP BLD-CCNC: 81 U/L (ref 35–104)
ALP BLD-CCNC: 94 U/L (ref 35–104)
ALP BLD-CCNC: 96 U/L (ref 35–104)
ALT SERPL-CCNC: 18 U/L (ref 0–32)
ALT SERPL-CCNC: 21 U/L (ref 0–32)
ALT SERPL-CCNC: 25 U/L (ref 0–32)
ALT SERPL-CCNC: 27 U/L (ref 0–32)
ALT SERPL-CCNC: 28 U/L (ref 0–32)
ALT SERPL-CCNC: 31 U/L (ref 0–32)
ALT SERPL-CCNC: 31 U/L (ref 0–32)
ALT SERPL-CCNC: 33 U/L (ref 0–32)
ALT SERPL-CCNC: 34 U/L (ref 0–32)
ALT SERPL-CCNC: 34 U/L (ref 0–32)
ALT SERPL-CCNC: 36 U/L (ref 0–32)
ALT SERPL-CCNC: 58 U/L (ref 0–32)
ALT SERPL-CCNC: 72 U/L (ref 0–32)
ANION GAP SERPL CALCULATED.3IONS-SCNC: 10 MMOL/L (ref 7–16)
ANION GAP SERPL CALCULATED.3IONS-SCNC: 11 MMOL/L (ref 7–16)
ANION GAP SERPL CALCULATED.3IONS-SCNC: 12 MMOL/L (ref 7–16)
ANION GAP SERPL CALCULATED.3IONS-SCNC: 13 MMOL/L (ref 7–16)
ANION GAP SERPL CALCULATED.3IONS-SCNC: 14 MMOL/L (ref 7–16)
ANION GAP SERPL CALCULATED.3IONS-SCNC: 15 MMOL/L (ref 7–16)
ANION GAP SERPL CALCULATED.3IONS-SCNC: 15 MMOL/L (ref 7–16)
ANION GAP SERPL CALCULATED.3IONS-SCNC: 6 MMOL/L (ref 7–16)
ANION GAP SERPL CALCULATED.3IONS-SCNC: 8 MMOL/L (ref 7–16)
ANION GAP SERPL CALCULATED.3IONS-SCNC: 8 MMOL/L (ref 7–16)
ANION GAP SERPL CALCULATED.3IONS-SCNC: 9 MMOL/L (ref 7–16)
ANION GAP SERPL CALCULATED.3IONS-SCNC: 9 MMOL/L (ref 7–16)
ANISOCYTOSIS: ABNORMAL
ANTIBODY SCREEN: NORMAL
ANTIBODY SCREEN: NORMAL
APPEARANCE FLUID: CLEAR
AST SERPL-CCNC: 21 U/L (ref 0–31)
AST SERPL-CCNC: 23 U/L (ref 0–31)
AST SERPL-CCNC: 27 U/L (ref 0–31)
AST SERPL-CCNC: 28 U/L (ref 0–31)
AST SERPL-CCNC: 29 U/L (ref 0–31)
AST SERPL-CCNC: 30 U/L (ref 0–31)
AST SERPL-CCNC: 30 U/L (ref 0–31)
AST SERPL-CCNC: 31 U/L (ref 0–31)
AST SERPL-CCNC: 31 U/L (ref 0–31)
AST SERPL-CCNC: 33 U/L (ref 0–31)
AST SERPL-CCNC: 36 U/L (ref 0–31)
AST SERPL-CCNC: 39 U/L (ref 0–31)
AST SERPL-CCNC: 45 U/L (ref 0–31)
AST SERPL-CCNC: 58 U/L (ref 0–31)
AST SERPL-CCNC: 59 U/L (ref 0–31)
B.E.: -0.7 MMOL/L (ref -3–3)
B.E.: -0.9 MMOL/L (ref -3–3)
B.E.: -1.8 MMOL/L (ref -3–3)
B.E.: -2.2 MMOL/L (ref -3–3)
B.E.: -2.3 MMOL/L (ref -3–3)
B.E.: -5.2 MMOL/L (ref -3–3)
B.E.: 1.2 MMOL/L (ref -3–3)
B.E.: 1.8 MMOL/L (ref -3–3)
B.E.: 5.4 MMOL/L (ref -3–3)
B.E.: 7.2 MMOL/L (ref -3–3)
B.E.: 7.3 MMOL/L (ref -3–3)
B.E.: 7.7 MMOL/L (ref -3–3)
B.E.: 7.9 MMOL/L (ref -3–3)
B.E.: 8 MMOL/L (ref -3–3)
B.E.: 9.8 MMOL/L (ref -3–3)
BACTERIA: ABNORMAL /HPF
BACTERIA: NORMAL /HPF
BASOPHILIC STIPPLING: ABNORMAL
BASOPHILS ABSOLUTE: 0 E9/L (ref 0–0.2)
BASOPHILS ABSOLUTE: 0.01 E9/L (ref 0–0.2)
BASOPHILS ABSOLUTE: 0.04 E9/L (ref 0–0.2)
BASOPHILS RELATIVE PERCENT: 0 % (ref 0–2)
BASOPHILS RELATIVE PERCENT: 0.1 % (ref 0–2)
BASOPHILS RELATIVE PERCENT: 0.2 % (ref 0–2)
BASOPHILS RELATIVE PERCENT: 0.2 % (ref 0–2)
BASOPHILS RELATIVE PERCENT: 0.7 % (ref 0–2)
BILIRUB SERPL-MCNC: 0.2 MG/DL (ref 0–1.2)
BILIRUB SERPL-MCNC: 0.3 MG/DL (ref 0–1.2)
BILIRUB SERPL-MCNC: 0.4 MG/DL (ref 0–1.2)
BILIRUB SERPL-MCNC: 0.6 MG/DL (ref 0–1.2)
BILIRUB SERPL-MCNC: 0.7 MG/DL (ref 0–1.2)
BILIRUB SERPL-MCNC: <0.2 MG/DL (ref 0–1.2)
BILIRUBIN URINE: NEGATIVE
BILIRUBIN URINE: NEGATIVE
BLOOD BANK DISPENSE STATUS: NORMAL
BLOOD BANK PRODUCT CODE: NORMAL
BLOOD CULTURE, ROUTINE: NORMAL
BLOOD CULTURE, ROUTINE: NORMAL
BLOOD, URINE: ABNORMAL
BLOOD, URINE: NEGATIVE
BODY FLUID CULTURE, STERILE: NORMAL
BPU ID: NORMAL
BUN BLDV-MCNC: 15 MG/DL (ref 8–23)
BUN BLDV-MCNC: 20 MG/DL (ref 8–23)
BUN BLDV-MCNC: 35 MG/DL (ref 8–23)
BUN BLDV-MCNC: 37 MG/DL (ref 8–23)
BUN BLDV-MCNC: 40 MG/DL (ref 8–23)
BUN BLDV-MCNC: 42 MG/DL (ref 8–23)
BUN BLDV-MCNC: 43 MG/DL (ref 8–23)
BUN BLDV-MCNC: 45 MG/DL (ref 8–23)
BUN BLDV-MCNC: 46 MG/DL (ref 8–23)
BUN BLDV-MCNC: 47 MG/DL (ref 8–23)
BUN BLDV-MCNC: 48 MG/DL (ref 8–23)
BUN BLDV-MCNC: 49 MG/DL (ref 8–23)
BUN BLDV-MCNC: 50 MG/DL (ref 8–23)
BUN BLDV-MCNC: 51 MG/DL (ref 8–23)
CALCIUM IONIZED: 1.22 MMOL/L (ref 1.15–1.33)
CALCIUM SERPL-MCNC: 10.3 MG/DL (ref 8.6–10.2)
CALCIUM SERPL-MCNC: 8.2 MG/DL (ref 8.6–10.2)
CALCIUM SERPL-MCNC: 8.3 MG/DL (ref 8.6–10.2)
CALCIUM SERPL-MCNC: 8.3 MG/DL (ref 8.6–10.2)
CALCIUM SERPL-MCNC: 8.4 MG/DL (ref 8.6–10.2)
CALCIUM SERPL-MCNC: 8.5 MG/DL (ref 8.6–10.2)
CALCIUM SERPL-MCNC: 8.5 MG/DL (ref 8.6–10.2)
CALCIUM SERPL-MCNC: 8.6 MG/DL (ref 8.6–10.2)
CALCIUM SERPL-MCNC: 8.7 MG/DL (ref 8.6–10.2)
CALCIUM SERPL-MCNC: 8.7 MG/DL (ref 8.6–10.2)
CALCIUM SERPL-MCNC: 8.8 MG/DL (ref 8.6–10.2)
CALCIUM SERPL-MCNC: 9.1 MG/DL (ref 8.6–10.2)
CASTS: NORMAL /LPF
CEA,FLUID: 1 NG/ML
CEA,FLUID: 1 NG/ML
CELL COUNT FLUID TYPE: NORMAL
CHLORIDE BLD-SCNC: 100 MMOL/L (ref 98–107)
CHLORIDE BLD-SCNC: 102 MMOL/L (ref 98–107)
CHLORIDE BLD-SCNC: 103 MMOL/L (ref 98–107)
CHLORIDE BLD-SCNC: 104 MMOL/L (ref 98–107)
CHLORIDE BLD-SCNC: 104 MMOL/L (ref 98–107)
CHLORIDE BLD-SCNC: 105 MMOL/L (ref 98–107)
CHLORIDE BLD-SCNC: 108 MMOL/L (ref 98–107)
CHLORIDE BLD-SCNC: 108 MMOL/L (ref 98–107)
CHLORIDE BLD-SCNC: 110 MMOL/L (ref 98–107)
CHLORIDE BLD-SCNC: 110 MMOL/L (ref 98–107)
CHLORIDE BLD-SCNC: 112 MMOL/L (ref 98–107)
CHLORIDE BLD-SCNC: 113 MMOL/L (ref 98–107)
CHLORIDE BLD-SCNC: 94 MMOL/L (ref 98–107)
CHLORIDE BLD-SCNC: 97 MMOL/L (ref 98–107)
CHLORIDE BLD-SCNC: 99 MMOL/L (ref 98–107)
CLARITY: CLEAR
CLARITY: CLEAR
CO2: 22 MMOL/L (ref 22–29)
CO2: 24 MMOL/L (ref 22–29)
CO2: 25 MMOL/L (ref 22–29)
CO2: 25 MMOL/L (ref 22–29)
CO2: 28 MMOL/L (ref 22–29)
CO2: 29 MMOL/L (ref 22–29)
CO2: 30 MMOL/L (ref 22–29)
CO2: 30 MMOL/L (ref 22–29)
CO2: 31 MMOL/L (ref 22–29)
CO2: 31 MMOL/L (ref 22–29)
CO2: 32 MMOL/L (ref 22–29)
CO2: 35 MMOL/L (ref 22–29)
CO2: 37 MMOL/L (ref 22–29)
COHB: 0.1 % (ref 0–1.5)
COHB: 0.2 % (ref 0–1.5)
COHB: 0.2 % (ref 0–1.5)
COHB: 0.3 % (ref 0–1.5)
COHB: 0.4 % (ref 0–1.5)
COHB: 0.5 % (ref 0–1.5)
COHB: 0.5 % (ref 0–1.5)
COHB: 0.6 % (ref 0–1.5)
COHB: 0.6 % (ref 0–1.5)
COHB: 0.7 % (ref 0–1.5)
COLOR FLUID: YELLOW
COLOR: YELLOW
COLOR: YELLOW
COMMENT: ABNORMAL
CREAT SERPL-MCNC: 0.7 MG/DL (ref 0.5–1)
CREAT SERPL-MCNC: 0.8 MG/DL (ref 0.5–1)
CREAT SERPL-MCNC: 0.9 MG/DL (ref 0.5–1)
CREAT SERPL-MCNC: 1 MG/DL (ref 0.5–1)
CRITICAL: ABNORMAL
CULTURE, BLOOD 2: NORMAL
CULTURE, BLOOD 2: NORMAL
CULTURE, RESPIRATORY: ABNORMAL
DATE ANALYZED: ABNORMAL
DATE OF COLLECTION: ABNORMAL
DESCRIPTION BLOOD BANK: NORMAL
EKG ATRIAL RATE: 100 BPM
EKG ATRIAL RATE: 101 BPM
EKG ATRIAL RATE: 258 BPM
EKG ATRIAL RATE: 300 BPM
EKG P AXIS: 47 DEGREES
EKG P-R INTERVAL: 220 MS
EKG Q-T INTERVAL: 304 MS
EKG Q-T INTERVAL: 324 MS
EKG Q-T INTERVAL: 366 MS
EKG Q-T INTERVAL: 430 MS
EKG QRS DURATION: 74 MS
EKG QRS DURATION: 78 MS
EKG QRS DURATION: 84 MS
EKG QRS DURATION: 86 MS
EKG QTC CALCULATION (BAZETT): 376 MS
EKG QTC CALCULATION (BAZETT): 405 MS
EKG QTC CALCULATION (BAZETT): 420 MS
EKG QTC CALCULATION (BAZETT): 437 MS
EKG R AXIS: 46 DEGREES
EKG R AXIS: 60 DEGREES
EKG R AXIS: 73 DEGREES
EKG R AXIS: 74 DEGREES
EKG T AXIS: -108 DEGREES
EKG T AXIS: -2 DEGREES
EKG T AXIS: 4 DEGREES
EKG T AXIS: 79 DEGREES
EKG VENTRICULAR RATE: 101 BPM
EKG VENTRICULAR RATE: 107 BPM
EKG VENTRICULAR RATE: 46 BPM
EKG VENTRICULAR RATE: 86 BPM
EOSINOPHILS ABSOLUTE: 0 E9/L (ref 0.05–0.5)
EOSINOPHILS ABSOLUTE: 0.01 E9/L (ref 0.05–0.5)
EOSINOPHILS ABSOLUTE: 0.03 E9/L (ref 0.05–0.5)
EOSINOPHILS ABSOLUTE: 0.1 E9/L (ref 0.05–0.5)
EOSINOPHILS RELATIVE PERCENT: 0 % (ref 0–6)
EOSINOPHILS RELATIVE PERCENT: 0.1 % (ref 0–6)
EOSINOPHILS RELATIVE PERCENT: 0.3 % (ref 0–6)
EOSINOPHILS RELATIVE PERCENT: 1.7 % (ref 0–6)
EPITHELIAL CELLS, UA: ABNORMAL /HPF
EPITHELIAL CELLS, UA: NORMAL /HPF
FILM ARRAY ADENOVIRUS: ABNORMAL
FILM ARRAY ADENOVIRUS: NORMAL
FILM ARRAY ADENOVIRUS: NORMAL
FILM ARRAY BORDETELLA PERTUSSIS: ABNORMAL
FILM ARRAY BORDETELLA PERTUSSIS: NORMAL
FILM ARRAY BORDETELLA PERTUSSIS: NORMAL
FILM ARRAY CHLAMYDOPHILIA PNEUMONIAE: ABNORMAL
FILM ARRAY CHLAMYDOPHILIA PNEUMONIAE: NORMAL
FILM ARRAY CHLAMYDOPHILIA PNEUMONIAE: NORMAL
FILM ARRAY CORONAVIRUS 229E: ABNORMAL
FILM ARRAY CORONAVIRUS 229E: NORMAL
FILM ARRAY CORONAVIRUS 229E: NORMAL
FILM ARRAY CORONAVIRUS HKU1: ABNORMAL
FILM ARRAY CORONAVIRUS HKU1: NORMAL
FILM ARRAY CORONAVIRUS HKU1: NORMAL
FILM ARRAY CORONAVIRUS NL63: ABNORMAL
FILM ARRAY CORONAVIRUS NL63: NORMAL
FILM ARRAY CORONAVIRUS NL63: NORMAL
FILM ARRAY CORONAVIRUS OC43: ABNORMAL
FILM ARRAY CORONAVIRUS OC43: NORMAL
FILM ARRAY CORONAVIRUS OC43: NORMAL
FILM ARRAY INFLUENZA A VIRUS 09H1: ABNORMAL
FILM ARRAY INFLUENZA A VIRUS 09H1: NORMAL
FILM ARRAY INFLUENZA A VIRUS 09H1: NORMAL
FILM ARRAY INFLUENZA A VIRUS H1: ABNORMAL
FILM ARRAY INFLUENZA A VIRUS H1: NORMAL
FILM ARRAY INFLUENZA A VIRUS H1: NORMAL
FILM ARRAY INFLUENZA A VIRUS H3: ABNORMAL
FILM ARRAY INFLUENZA A VIRUS H3: NORMAL
FILM ARRAY INFLUENZA A VIRUS H3: NORMAL
FILM ARRAY INFLUENZA A VIRUS: ABNORMAL
FILM ARRAY INFLUENZA A VIRUS: NORMAL
FILM ARRAY INFLUENZA A VIRUS: NORMAL
FILM ARRAY INFLUENZA B: ABNORMAL
FILM ARRAY INFLUENZA B: NORMAL
FILM ARRAY INFLUENZA B: NORMAL
FILM ARRAY METAPNEUMOVIRUS: NORMAL
FILM ARRAY METAPNEUMOVIRUS: NORMAL
FILM ARRAY MYCOPLASMA PNEUMONIAE: ABNORMAL
FILM ARRAY MYCOPLASMA PNEUMONIAE: NORMAL
FILM ARRAY MYCOPLASMA PNEUMONIAE: NORMAL
FILM ARRAY PARAINFLUENZA VIRUS 1: ABNORMAL
FILM ARRAY PARAINFLUENZA VIRUS 1: NORMAL
FILM ARRAY PARAINFLUENZA VIRUS 1: NORMAL
FILM ARRAY PARAINFLUENZA VIRUS 2: ABNORMAL
FILM ARRAY PARAINFLUENZA VIRUS 2: NORMAL
FILM ARRAY PARAINFLUENZA VIRUS 2: NORMAL
FILM ARRAY PARAINFLUENZA VIRUS 3: ABNORMAL
FILM ARRAY PARAINFLUENZA VIRUS 3: NORMAL
FILM ARRAY PARAINFLUENZA VIRUS 3: NORMAL
FILM ARRAY PARAINFLUENZA VIRUS 4: ABNORMAL
FILM ARRAY PARAINFLUENZA VIRUS 4: NORMAL
FILM ARRAY PARAINFLUENZA VIRUS 4: NORMAL
FILM ARRAY RESPIRATORY SYNCITIAL VIRUS: ABNORMAL
FILM ARRAY RESPIRATORY SYNCITIAL VIRUS: NORMAL
FILM ARRAY RESPIRATORY SYNCITIAL VIRUS: NORMAL
FILM ARRAY RHINOVIRUS/ENTEROVIRUS: ABNORMAL
FILM ARRAY RHINOVIRUS/ENTEROVIRUS: NORMAL
FILM ARRAY RHINOVIRUS/ENTEROVIRUS: NORMAL
FIO2: 30 %
FIO2: 40 %
FIO2: 50 %
FLUID PATH CONSULT: YES
FLUID TYPE: NORMAL
GFR AFRICAN AMERICAN: >60
GFR NON-AFRICAN AMERICAN: 53 ML/MIN/1.73
GFR NON-AFRICAN AMERICAN: 60 ML/MIN/1.73
GFR NON-AFRICAN AMERICAN: >60 ML/MIN/1.73
GLUCOSE BLD-MCNC: 100 MG/DL (ref 74–99)
GLUCOSE BLD-MCNC: 101 MG/DL (ref 74–99)
GLUCOSE BLD-MCNC: 103 MG/DL (ref 74–99)
GLUCOSE BLD-MCNC: 105 MG/DL (ref 74–99)
GLUCOSE BLD-MCNC: 109 MG/DL (ref 74–99)
GLUCOSE BLD-MCNC: 123 MG/DL (ref 74–99)
GLUCOSE BLD-MCNC: 131 MG/DL (ref 74–99)
GLUCOSE BLD-MCNC: 136 MG/DL (ref 74–99)
GLUCOSE BLD-MCNC: 139 MG/DL (ref 74–99)
GLUCOSE BLD-MCNC: 140 MG/DL (ref 74–99)
GLUCOSE BLD-MCNC: 149 MG/DL (ref 74–99)
GLUCOSE BLD-MCNC: 151 MG/DL (ref 74–99)
GLUCOSE BLD-MCNC: 160 MG/DL (ref 74–99)
GLUCOSE BLD-MCNC: 164 MG/DL (ref 74–99)
GLUCOSE BLD-MCNC: 166 MG/DL (ref 74–99)
GLUCOSE BLD-MCNC: 168 MG/DL (ref 74–99)
GLUCOSE BLD-MCNC: 177 MG/DL (ref 74–99)
GLUCOSE BLD-MCNC: 227 MG/DL (ref 74–99)
GLUCOSE BLD-MCNC: 73 MG/DL (ref 74–99)
GLUCOSE BLD-MCNC: 89 MG/DL (ref 74–99)
GLUCOSE BLD-MCNC: 90 MG/DL (ref 74–99)
GLUCOSE BLD-MCNC: 97 MG/DL (ref 74–99)
GLUCOSE URINE: NEGATIVE MG/DL
GLUCOSE URINE: NEGATIVE MG/DL
GLUCOSE, FLUID: 157 MG/DL
GRAM STAIN ORDERABLE: NORMAL
GRAM STAIN RESULT: NORMAL
HCO3: 22 MMOL/L (ref 22–26)
HCO3: 22.2 MMOL/L (ref 22–26)
HCO3: 22.3 MMOL/L (ref 22–26)
HCO3: 23.3 MMOL/L (ref 22–26)
HCO3: 23.8 MMOL/L (ref 22–26)
HCO3: 26.5 MMOL/L (ref 22–26)
HCO3: 26.5 MMOL/L (ref 22–26)
HCO3: 30.3 MMOL/L (ref 22–26)
HCO3: 31.3 MMOL/L (ref 22–26)
HCO3: 31.4 MMOL/L (ref 22–26)
HCO3: 32.2 MMOL/L (ref 22–26)
HCO3: 32.5 MMOL/L (ref 22–26)
HCO3: 33.3 MMOL/L (ref 22–26)
HCO3: 35.1 MMOL/L (ref 22–26)
HCO3: 35.4 MMOL/L (ref 22–26)
HCT VFR BLD CALC: 22.7 % (ref 34–48)
HCT VFR BLD CALC: 24.8 % (ref 34–48)
HCT VFR BLD CALC: 27 % (ref 34–48)
HCT VFR BLD CALC: 27.3 % (ref 34–48)
HCT VFR BLD CALC: 28.3 % (ref 34–48)
HCT VFR BLD CALC: 28.4 % (ref 34–48)
HCT VFR BLD CALC: 28.6 % (ref 34–48)
HCT VFR BLD CALC: 29 % (ref 34–48)
HCT VFR BLD CALC: 29 % (ref 34–48)
HCT VFR BLD CALC: 29.3 % (ref 34–48)
HCT VFR BLD CALC: 29.6 % (ref 34–48)
HCT VFR BLD CALC: 30.3 % (ref 34–48)
HCT VFR BLD CALC: 30.3 % (ref 34–48)
HCT VFR BLD CALC: 31 % (ref 34–48)
HCT VFR BLD CALC: 32.3 % (ref 34–48)
HCT VFR BLD CALC: 33 % (ref 34–48)
HCT VFR BLD CALC: 33.2 % (ref 34–48)
HCT VFR BLD CALC: 33.3 % (ref 34–48)
HCT VFR BLD CALC: 33.8 % (ref 34–48)
HCT VFR BLD CALC: 35.8 % (ref 34–48)
HCT VFR BLD CALC: 40.8 % (ref 34–48)
HEMOGLOBIN: 10.1 G/DL (ref 11.5–15.5)
HEMOGLOBIN: 10.6 G/DL (ref 11.5–15.5)
HEMOGLOBIN: 12.1 G/DL (ref 11.5–15.5)
HEMOGLOBIN: 6.8 G/DL (ref 11.5–15.5)
HEMOGLOBIN: 7.4 G/DL (ref 11.5–15.5)
HEMOGLOBIN: 8.2 G/DL (ref 11.5–15.5)
HEMOGLOBIN: 8.4 G/DL (ref 11.5–15.5)
HEMOGLOBIN: 8.5 G/DL (ref 11.5–15.5)
HEMOGLOBIN: 8.7 G/DL (ref 11.5–15.5)
HEMOGLOBIN: 8.9 G/DL (ref 11.5–15.5)
HEMOGLOBIN: 9 G/DL (ref 11.5–15.5)
HEMOGLOBIN: 9.1 G/DL (ref 11.5–15.5)
HEMOGLOBIN: 9.2 G/DL (ref 11.5–15.5)
HEMOGLOBIN: 9.2 G/DL (ref 11.5–15.5)
HEMOGLOBIN: 9.5 G/DL (ref 11.5–15.5)
HEMOGLOBIN: 9.7 G/DL (ref 11.5–15.5)
HEMOGLOBIN: 9.8 G/DL (ref 11.5–15.5)
HEMOGLOBIN: 9.8 G/DL (ref 11.5–15.5)
HEMOGLOBIN: 9.9 G/DL (ref 11.5–15.5)
HGB, POC: 10.6
HHB: 0.9 % (ref 0–5)
HHB: 0.9 % (ref 0–5)
HHB: 1.1 % (ref 0–5)
HHB: 1.2 % (ref 0–5)
HHB: 1.3 % (ref 0–5)
HHB: 1.5 % (ref 0–5)
HHB: 1.5 % (ref 0–5)
HHB: 1.6 % (ref 0–5)
HHB: 1.7 % (ref 0–5)
HHB: 2.6 % (ref 0–5)
HHB: 3 % (ref 0–5)
HHB: 3.5 % (ref 0–5)
HHB: 4 % (ref 0–5)
HHB: 5.3 % (ref 0–5)
HHB: 5.9 % (ref 0–5)
HYPOCHROMIA: ABNORMAL
IMMATURE GRANULOCYTES #: 0.02 E9/L
IMMATURE GRANULOCYTES #: 0.04 E9/L
IMMATURE GRANULOCYTES #: 0.17 E9/L
IMMATURE GRANULOCYTES #: 0.17 E9/L
IMMATURE GRANULOCYTES #: 0.25 E9/L
IMMATURE GRANULOCYTES %: 0.3 % (ref 0–5)
IMMATURE GRANULOCYTES %: 0.5 % (ref 0–5)
IMMATURE GRANULOCYTES %: 1 % (ref 0–5)
IMMATURE GRANULOCYTES %: 2 % (ref 0–5)
IMMATURE GRANULOCYTES %: 2.3 % (ref 0–5)
INFLUENZA A BY PCR: NOT DETECTED
INFLUENZA B BY PCR: NOT DETECTED
INR BLD: 1
INR BLD: 1.1
INR BLD: 1.2
INR BLD: 1.4
INR BLD: 1.8
INR BLD: 2
INR BLD: 2
INR BLD: 2.4
INR BLD: 2.5
INR BLD: 2.7
INR BLD: 3.4
INR BLD: 3.6
INR BLD: 4.9
INR BLD: 5.4
INR BLD: 5.8
INR BLD: ABNORMAL
KETONES, URINE: 15 MG/DL
KETONES, URINE: NEGATIVE MG/DL
LAB: ABNORMAL
LACTIC ACID: 0.7 MMOL/L (ref 0.5–2.2)
LACTIC ACID: 1.1 MMOL/L (ref 0.5–2.2)
LD, FLUID: 79 U/L
LEUKOCYTE ESTERASE, URINE: NEGATIVE
LEUKOCYTE ESTERASE, URINE: NEGATIVE
LV EF: 65 %
LVEF MODALITY: NORMAL
LYMPHOCYTES ABSOLUTE: 0.1 E9/L (ref 1.5–4)
LYMPHOCYTES ABSOLUTE: 0.1 E9/L (ref 1.5–4)
LYMPHOCYTES ABSOLUTE: 0.17 E9/L (ref 1.5–4)
LYMPHOCYTES ABSOLUTE: 0.21 E9/L (ref 1.5–4)
LYMPHOCYTES ABSOLUTE: 0.21 E9/L (ref 1.5–4)
LYMPHOCYTES ABSOLUTE: 0.26 E9/L (ref 1.5–4)
LYMPHOCYTES ABSOLUTE: 0.3 E9/L (ref 1.5–4)
LYMPHOCYTES ABSOLUTE: 0.36 E9/L (ref 1.5–4)
LYMPHOCYTES ABSOLUTE: 0.46 E9/L (ref 1.5–4)
LYMPHOCYTES ABSOLUTE: 0.52 E9/L (ref 1.5–4)
LYMPHOCYTES ABSOLUTE: 0.54 E9/L (ref 1.5–4)
LYMPHOCYTES ABSOLUTE: 0.63 E9/L (ref 1.5–4)
LYMPHOCYTES ABSOLUTE: 0.71 E9/L (ref 1.5–4)
LYMPHOCYTES ABSOLUTE: 0.72 E9/L (ref 1.5–4)
LYMPHOCYTES RELATIVE PERCENT: 0.9 % (ref 20–42)
LYMPHOCYTES RELATIVE PERCENT: 0.9 % (ref 20–42)
LYMPHOCYTES RELATIVE PERCENT: 1.8 % (ref 20–42)
LYMPHOCYTES RELATIVE PERCENT: 12.3 % (ref 20–42)
LYMPHOCYTES RELATIVE PERCENT: 2 % (ref 20–42)
LYMPHOCYTES RELATIVE PERCENT: 2.6 % (ref 20–42)
LYMPHOCYTES RELATIVE PERCENT: 3.1 % (ref 20–42)
LYMPHOCYTES RELATIVE PERCENT: 3.5 % (ref 20–42)
LYMPHOCYTES RELATIVE PERCENT: 3.5 % (ref 20–42)
LYMPHOCYTES RELATIVE PERCENT: 4.3 % (ref 20–42)
LYMPHOCYTES RELATIVE PERCENT: 5.9 % (ref 20–42)
Lab: ABNORMAL
MAGNESIUM: 1.6 MG/DL (ref 1.6–2.6)
MAGNESIUM: 1.7 MG/DL (ref 1.6–2.6)
MAGNESIUM: 1.7 MG/DL (ref 1.6–2.6)
MAGNESIUM: 1.8 MG/DL (ref 1.6–2.6)
MAGNESIUM: 1.9 MG/DL (ref 1.6–2.6)
MAGNESIUM: 1.9 MG/DL (ref 1.6–2.6)
MAGNESIUM: 2 MG/DL (ref 1.6–2.6)
MAGNESIUM: 2.1 MG/DL (ref 1.6–2.6)
MAGNESIUM: 2.2 MG/DL (ref 1.6–2.6)
MAGNESIUM: 2.2 MG/DL (ref 1.6–2.6)
MCH RBC QN AUTO: 27.9 PG (ref 26–35)
MCH RBC QN AUTO: 28 PG (ref 26–35)
MCH RBC QN AUTO: 28 PG (ref 26–35)
MCH RBC QN AUTO: 28.1 PG (ref 26–35)
MCH RBC QN AUTO: 28.1 PG (ref 26–35)
MCH RBC QN AUTO: 28.2 PG (ref 26–35)
MCH RBC QN AUTO: 28.2 PG (ref 26–35)
MCH RBC QN AUTO: 28.3 PG (ref 26–35)
MCH RBC QN AUTO: 28.4 PG (ref 26–35)
MCH RBC QN AUTO: 28.4 PG (ref 26–35)
MCH RBC QN AUTO: 28.5 PG (ref 26–35)
MCH RBC QN AUTO: 28.6 PG (ref 26–35)
MCH RBC QN AUTO: 28.7 PG (ref 26–35)
MCH RBC QN AUTO: 28.8 PG (ref 26–35)
MCH RBC QN AUTO: 28.9 PG (ref 26–35)
MCHC RBC AUTO-ENTMCNC: 29.4 % (ref 32–34.5)
MCHC RBC AUTO-ENTMCNC: 29.4 % (ref 32–34.5)
MCHC RBC AUTO-ENTMCNC: 29.6 % (ref 32–34.5)
MCHC RBC AUTO-ENTMCNC: 29.7 % (ref 32–34.5)
MCHC RBC AUTO-ENTMCNC: 29.7 % (ref 32–34.5)
MCHC RBC AUTO-ENTMCNC: 29.8 % (ref 32–34.5)
MCHC RBC AUTO-ENTMCNC: 29.8 % (ref 32–34.5)
MCHC RBC AUTO-ENTMCNC: 29.9 % (ref 32–34.5)
MCHC RBC AUTO-ENTMCNC: 30 % (ref 32–34.5)
MCHC RBC AUTO-ENTMCNC: 30 % (ref 32–34.5)
MCHC RBC AUTO-ENTMCNC: 30.3 % (ref 32–34.5)
MCHC RBC AUTO-ENTMCNC: 30.4 % (ref 32–34.5)
MCHC RBC AUTO-ENTMCNC: 30.4 % (ref 32–34.5)
MCHC RBC AUTO-ENTMCNC: 30.6 % (ref 32–34.5)
MCHC RBC AUTO-ENTMCNC: 31 % (ref 32–34.5)
MCHC RBC AUTO-ENTMCNC: 31.1 % (ref 32–34.5)
MCHC RBC AUTO-ENTMCNC: 31.4 % (ref 32–34.5)
MCV RBC AUTO: 90.1 FL (ref 80–99.9)
MCV RBC AUTO: 90.4 FL (ref 80–99.9)
MCV RBC AUTO: 90.8 FL (ref 80–99.9)
MCV RBC AUTO: 90.9 FL (ref 80–99.9)
MCV RBC AUTO: 91.6 FL (ref 80–99.9)
MCV RBC AUTO: 91.8 FL (ref 80–99.9)
MCV RBC AUTO: 93.1 FL (ref 80–99.9)
MCV RBC AUTO: 93.4 FL (ref 80–99.9)
MCV RBC AUTO: 93.8 FL (ref 80–99.9)
MCV RBC AUTO: 94.2 FL (ref 80–99.9)
MCV RBC AUTO: 94.3 FL (ref 80–99.9)
MCV RBC AUTO: 94.4 FL (ref 80–99.9)
MCV RBC AUTO: 94.4 FL (ref 80–99.9)
MCV RBC AUTO: 95.1 FL (ref 80–99.9)
MCV RBC AUTO: 95.7 FL (ref 80–99.9)
MCV RBC AUTO: 96 FL (ref 80–99.9)
MCV RBC AUTO: 96 FL (ref 80–99.9)
MCV RBC AUTO: 96.5 FL (ref 80–99.9)
MCV RBC AUTO: 96.9 FL (ref 80–99.9)
METER GLUCOSE: 112 MG/DL (ref 74–99)
METER GLUCOSE: 89 MG/DL (ref 74–99)
METHB: 0.3 % (ref 0–1.5)
METHB: 0.3 % (ref 0–1.5)
METHB: 0.4 % (ref 0–1.5)
METHB: 0.5 % (ref 0–1.5)
METHB: 0.6 % (ref 0–1.5)
MODE: ABNORMAL
MODE: AC
MONOCYTE, FLUID: 95 %
MONOCYTES ABSOLUTE: 0.27 E9/L (ref 0.1–0.95)
MONOCYTES ABSOLUTE: 0.28 E9/L (ref 0.1–0.95)
MONOCYTES ABSOLUTE: 0.31 E9/L (ref 0.1–0.95)
MONOCYTES ABSOLUTE: 0.35 E9/L (ref 0.1–0.95)
MONOCYTES ABSOLUTE: 0.36 E9/L (ref 0.1–0.95)
MONOCYTES ABSOLUTE: 0.38 E9/L (ref 0.1–0.95)
MONOCYTES ABSOLUTE: 0.4 E9/L (ref 0.1–0.95)
MONOCYTES ABSOLUTE: 0.5 E9/L (ref 0.1–0.95)
MONOCYTES ABSOLUTE: 0.53 E9/L (ref 0.1–0.95)
MONOCYTES ABSOLUTE: 0.61 E9/L (ref 0.1–0.95)
MONOCYTES ABSOLUTE: 0.64 E9/L (ref 0.1–0.95)
MONOCYTES ABSOLUTE: 0.65 E9/L (ref 0.1–0.95)
MONOCYTES ABSOLUTE: 0.96 E9/L (ref 0.1–0.95)
MONOCYTES ABSOLUTE: 1.07 E9/L (ref 0.1–0.95)
MONOCYTES RELATIVE PERCENT: 1.7 % (ref 2–12)
MONOCYTES RELATIVE PERCENT: 1.7 % (ref 2–12)
MONOCYTES RELATIVE PERCENT: 10 % (ref 2–12)
MONOCYTES RELATIVE PERCENT: 2.6 % (ref 2–12)
MONOCYTES RELATIVE PERCENT: 3.5 % (ref 2–12)
MONOCYTES RELATIVE PERCENT: 3.9 % (ref 2–12)
MONOCYTES RELATIVE PERCENT: 4.4 % (ref 2–12)
MONOCYTES RELATIVE PERCENT: 4.4 % (ref 2–12)
MONOCYTES RELATIVE PERCENT: 5.2 % (ref 2–12)
MONOCYTES RELATIVE PERCENT: 7.4 % (ref 2–12)
MONOCYTES RELATIVE PERCENT: 7.8 % (ref 2–12)
MONOCYTES RELATIVE PERCENT: 8.7 % (ref 2–12)
MYELOCYTE PERCENT: 0.9 % (ref 0–0)
MYELOCYTE PERCENT: 1.7 % (ref 0–0)
NEUTROPHIL, FLUID: 5 %
NEUTROPHILS ABSOLUTE: 10.8 E9/L (ref 1.8–7.3)
NEUTROPHILS ABSOLUTE: 12.41 E9/L (ref 1.8–7.3)
NEUTROPHILS ABSOLUTE: 12.78 E9/L (ref 1.8–7.3)
NEUTROPHILS ABSOLUTE: 14.38 E9/L (ref 1.8–7.3)
NEUTROPHILS ABSOLUTE: 15.16 E9/L (ref 1.8–7.3)
NEUTROPHILS ABSOLUTE: 17.2 E9/L (ref 1.8–7.3)
NEUTROPHILS ABSOLUTE: 4.41 E9/L (ref 1.8–7.3)
NEUTROPHILS ABSOLUTE: 6.44 E9/L (ref 1.8–7.3)
NEUTROPHILS ABSOLUTE: 6.58 E9/L (ref 1.8–7.3)
NEUTROPHILS ABSOLUTE: 7.56 E9/L (ref 1.8–7.3)
NEUTROPHILS ABSOLUTE: 8.12 E9/L (ref 1.8–7.3)
NEUTROPHILS ABSOLUTE: 8.69 E9/L (ref 1.8–7.3)
NEUTROPHILS ABSOLUTE: 9.7 E9/L (ref 1.8–7.3)
NEUTROPHILS ABSOLUTE: 9.89 E9/L (ref 1.8–7.3)
NEUTROPHILS RELATIVE PERCENT: 76.3 % (ref 43–80)
NEUTROPHILS RELATIVE PERCENT: 81.4 % (ref 43–80)
NEUTROPHILS RELATIVE PERCENT: 87 % (ref 43–80)
NEUTROPHILS RELATIVE PERCENT: 87.8 % (ref 43–80)
NEUTROPHILS RELATIVE PERCENT: 91.8 % (ref 43–80)
NEUTROPHILS RELATIVE PERCENT: 92.2 % (ref 43–80)
NEUTROPHILS RELATIVE PERCENT: 93.1 % (ref 43–80)
NEUTROPHILS RELATIVE PERCENT: 93.9 % (ref 43–80)
NEUTROPHILS RELATIVE PERCENT: 94.8 % (ref 43–80)
NEUTROPHILS RELATIVE PERCENT: 94.8 % (ref 43–80)
NEUTROPHILS RELATIVE PERCENT: 96.5 % (ref 43–80)
NITRITE, URINE: NEGATIVE
NITRITE, URINE: NEGATIVE
NUCLEATED CELLS FLUID: 475 /UL
NUCLEATED RED BLOOD CELLS: 0.9 /100 WBC
NUCLEATED RED BLOOD CELLS: 0.9 /100 WBC
O2 CONTENT: 14.6 ML/DL
O2 CONTENT: 14.7 ML/DL
O2 CONTENT: 18.1 ML/DL
O2 SATURATION: 94 % (ref 92–98.5)
O2 SATURATION: 94.7 % (ref 92–98.5)
O2 SATURATION: 96 % (ref 92–98.5)
O2 SATURATION: 96.5 % (ref 92–98.5)
O2 SATURATION: 97 % (ref 92–98.5)
O2 SATURATION: 97.4 % (ref 92–98.5)
O2 SATURATION: 98.3 % (ref 92–98.5)
O2 SATURATION: 98.4 % (ref 92–98.5)
O2 SATURATION: 98.5 % (ref 92–98.5)
O2 SATURATION: 98.5 % (ref 92–98.5)
O2 SATURATION: 98.7 % (ref 92–98.5)
O2 SATURATION: 98.8 % (ref 92–98.5)
O2 SATURATION: 98.9 % (ref 92–98.5)
O2 SATURATION: 99.1 % (ref 92–98.5)
O2 SATURATION: 99.1 % (ref 92–98.5)
O2HB: 93.1 % (ref 94–97)
O2HB: 93.8 % (ref 94–97)
O2HB: 95.3 % (ref 94–97)
O2HB: 95.6 % (ref 94–97)
O2HB: 96.1 % (ref 94–97)
O2HB: 96.7 % (ref 94–97)
O2HB: 97.6 % (ref 94–97)
O2HB: 97.7 % (ref 94–97)
O2HB: 97.8 % (ref 94–97)
O2HB: 98 % (ref 94–97)
O2HB: 98 % (ref 94–97)
O2HB: 98.1 % (ref 94–97)
O2HB: 98.4 % (ref 94–97)
OPERATOR ID: 1353
OPERATOR ID: 1353
OPERATOR ID: 1874
OPERATOR ID: 2577
OPERATOR ID: 2593
OPERATOR ID: 366
OPERATOR ID: 882
OPERATOR ID: ABNORMAL
ORGANISM: ABNORMAL
OVALOCYTES: ABNORMAL
PATIENT TEMP: 34 C
PATIENT TEMP: 37 C
PCO2: 37.1 MMHG (ref 35–45)
PCO2: 37.2 MMHG (ref 35–45)
PCO2: 37.4 MMHG (ref 35–45)
PCO2: 38.5 MMHG (ref 35–45)
PCO2: 40.9 MMHG (ref 35–45)
PCO2: 41.9 MMHG (ref 35–45)
PCO2: 45.2 MMHG (ref 35–45)
PCO2: 45.7 MMHG (ref 35–45)
PCO2: 50.3 MMHG (ref 35–45)
PCO2: 52.4 MMHG (ref 35–45)
PCO2: 52.9 MMHG (ref 35–45)
PCO2: 54.7 MMHG (ref 35–45)
PCO2: 58.1 MMHG (ref 35–45)
PCO2: 64.5 MMHG (ref 35–45)
PCO2: 81 MMHG (ref 35–45)
PDW BLD-RTO: 14.8 FL (ref 11.5–15)
PDW BLD-RTO: 14.9 FL (ref 11.5–15)
PDW BLD-RTO: 17.1 FL (ref 11.5–15)
PDW BLD-RTO: 17.2 FL (ref 11.5–15)
PDW BLD-RTO: 17.2 FL (ref 11.5–15)
PDW BLD-RTO: 17.4 FL (ref 11.5–15)
PDW BLD-RTO: 17.6 FL (ref 11.5–15)
PDW BLD-RTO: 17.6 FL (ref 11.5–15)
PDW BLD-RTO: 17.7 FL (ref 11.5–15)
PDW BLD-RTO: 17.7 FL (ref 11.5–15)
PDW BLD-RTO: 17.8 FL (ref 11.5–15)
PDW BLD-RTO: 17.9 FL (ref 11.5–15)
PDW BLD-RTO: 18 FL (ref 11.5–15)
PDW BLD-RTO: 18.1 FL (ref 11.5–15)
PDW BLD-RTO: 18.1 FL (ref 11.5–15)
PDW BLD-RTO: 18.4 FL (ref 11.5–15)
PDW BLD-RTO: 18.5 FL (ref 11.5–15)
PDW BLD-RTO: 18.6 FL (ref 11.5–15)
PDW BLD-RTO: 18.7 FL (ref 11.5–15)
PEEP/CPAP: 5 CMH2O
PEEP/CPAP: 6 CMH2O
PFO2: 2.03 MMHG/%
PFO2: 2.19 MMHG/%
PFO2: 2.3 MMHG/%
PFO2: 2.42 MMHG/%
PFO2: 2.8 MMHG/%
PFO2: 3.03 MMHG/%
PFO2: 3.09 MMHG/%
PFO2: 3.09 MMHG/%
PFO2: 3.23 MMHG/%
PFO2: 3.51 MMHG/%
PFO2: 3.52 MMHG/%
PFO2: 3.53 MMHG/%
PFO2: 4.93 MMHG/%
PH BLOOD GAS: 7.21 (ref 7.35–7.45)
PH BLOOD GAS: 7.26 (ref 7.35–7.45)
PH BLOOD GAS: 7.28 (ref 7.35–7.45)
PH BLOOD GAS: 7.35 (ref 7.35–7.45)
PH BLOOD GAS: 7.37 (ref 7.35–7.45)
PH BLOOD GAS: 7.39 (ref 7.35–7.45)
PH BLOOD GAS: 7.39 (ref 7.35–7.45)
PH BLOOD GAS: 7.4 (ref 7.35–7.45)
PH BLOOD GAS: 7.4 (ref 7.35–7.45)
PH BLOOD GAS: 7.41 (ref 7.35–7.45)
PH BLOOD GAS: 7.42 (ref 7.35–7.45)
PH BLOOD GAS: 7.44 (ref 7.35–7.45)
PH BLOOD GAS: 7.47 (ref 7.35–7.45)
PH BLOOD GAS: 7.47 (ref 7.35–7.45)
PH BLOOD GAS: 7.53 (ref 7.35–7.45)
PH UA: 5.5 (ref 5–9)
PH UA: 6 (ref 5–9)
PH, BODY FLUID: 7.63
PHOSPHORUS: 1.8 MG/DL (ref 2.5–4.5)
PHOSPHORUS: 2 MG/DL (ref 2.5–4.5)
PHOSPHORUS: 2.1 MG/DL (ref 2.5–4.5)
PHOSPHORUS: 2.1 MG/DL (ref 2.5–4.5)
PHOSPHORUS: 2.2 MG/DL (ref 2.5–4.5)
PHOSPHORUS: 2.2 MG/DL (ref 2.5–4.5)
PHOSPHORUS: 2.3 MG/DL (ref 2.5–4.5)
PHOSPHORUS: 2.4 MG/DL (ref 2.5–4.5)
PHOSPHORUS: 2.5 MG/DL (ref 2.5–4.5)
PHOSPHORUS: 2.9 MG/DL (ref 2.5–4.5)
PHOSPHORUS: 2.9 MG/DL (ref 2.5–4.5)
PIP: 16 CMH2O
PLATELET # BLD: 156 E9/L (ref 130–450)
PLATELET # BLD: 182 E9/L (ref 130–450)
PLATELET # BLD: 184 E9/L (ref 130–450)
PLATELET # BLD: 219 E9/L (ref 130–450)
PLATELET # BLD: 221 E9/L (ref 130–450)
PLATELET # BLD: 222 E9/L (ref 130–450)
PLATELET # BLD: 228 E9/L (ref 130–450)
PLATELET # BLD: 231 E9/L (ref 130–450)
PLATELET # BLD: 237 E9/L (ref 130–450)
PLATELET # BLD: 243 E9/L (ref 130–450)
PLATELET # BLD: 265 E9/L (ref 130–450)
PLATELET # BLD: 295 E9/L (ref 130–450)
PLATELET # BLD: 314 E9/L (ref 130–450)
PLATELET # BLD: 316 E9/L (ref 130–450)
PLATELET # BLD: 338 E9/L (ref 130–450)
PLATELET # BLD: 350 E9/L (ref 130–450)
PLATELET # BLD: 366 E9/L (ref 130–450)
PLATELET # BLD: 367 E9/L (ref 130–450)
PLATELET # BLD: 403 E9/L (ref 130–450)
PMV BLD AUTO: 10 FL (ref 7–12)
PMV BLD AUTO: 10.2 FL (ref 7–12)
PMV BLD AUTO: 10.2 FL (ref 7–12)
PMV BLD AUTO: 10.3 FL (ref 7–12)
PMV BLD AUTO: 10.4 FL (ref 7–12)
PMV BLD AUTO: 10.4 FL (ref 7–12)
PMV BLD AUTO: 10.5 FL (ref 7–12)
PMV BLD AUTO: 10.7 FL (ref 7–12)
PMV BLD AUTO: 10.9 FL (ref 7–12)
PMV BLD AUTO: 9.5 FL (ref 7–12)
PMV BLD AUTO: 9.6 FL (ref 7–12)
PMV BLD AUTO: 9.7 FL (ref 7–12)
PMV BLD AUTO: 9.9 FL (ref 7–12)
PMV BLD AUTO: 9.9 FL (ref 7–12)
PO2: 109.4 MMHG (ref 60–100)
PO2: 123.6 MMHG (ref 60–100)
PO2: 123.6 MMHG (ref 60–100)
PO2: 140.7 MMHG (ref 60–100)
PO2: 141.2 MMHG (ref 60–100)
PO2: 144.2 MMHG (ref 60–100)
PO2: 147.9 MMHG (ref 60–100)
PO2: 149.8 MMHG (ref 60–100)
PO2: 161.4 MMHG (ref 60–100)
PO2: 175.4 MMHG (ref 60–100)
PO2: 72.7 MMHG (ref 60–100)
PO2: 81.1 MMHG (ref 60–100)
PO2: 83.9 MMHG (ref 60–100)
PO2: 91 MMHG (ref 60–100)
PO2: 91.9 MMHG (ref 60–100)
POC CHLORIDE: ABNORMAL MMOL/L (ref 100–108)
POC CREATININE: ABNORMAL MG/DL (ref 0.5–1)
POC POTASSIUM: 4 MMOL/L (ref 3.5–5)
POC SODIUM: 148 MMOL/L (ref 132–146)
POIKILOCYTES: ABNORMAL
POLYCHROMASIA: ABNORMAL
POTASSIUM REFLEX MAGNESIUM: 4.1 MMOL/L (ref 3.5–5)
POTASSIUM SERPL-SCNC: 3.1 MMOL/L (ref 3.5–5)
POTASSIUM SERPL-SCNC: 3.5 MMOL/L (ref 3.5–5)
POTASSIUM SERPL-SCNC: 3.5 MMOL/L (ref 3.5–5)
POTASSIUM SERPL-SCNC: 3.6 MMOL/L (ref 3.5–5)
POTASSIUM SERPL-SCNC: 3.7 MMOL/L (ref 3.5–5)
POTASSIUM SERPL-SCNC: 3.7 MMOL/L (ref 3.5–5)
POTASSIUM SERPL-SCNC: 3.8 MMOL/L (ref 3.5–5)
POTASSIUM SERPL-SCNC: 3.8 MMOL/L (ref 3.5–5)
POTASSIUM SERPL-SCNC: 3.9 MMOL/L (ref 3.5–5)
POTASSIUM SERPL-SCNC: 4 MMOL/L (ref 3.5–5)
POTASSIUM SERPL-SCNC: 4 MMOL/L (ref 3.5–5)
POTASSIUM SERPL-SCNC: 4.1 MMOL/L (ref 3.5–5)
POTASSIUM SERPL-SCNC: 4.3 MMOL/L (ref 3.5–5)
POTASSIUM SERPL-SCNC: 4.47 MMOL/L (ref 3.3–5.1)
POTASSIUM SERPL-SCNC: 4.5 MMOL/L (ref 3.5–5)
POTASSIUM SERPL-SCNC: 4.5 MMOL/L (ref 3.5–5)
POTASSIUM SERPL-SCNC: 4.6 MMOL/L (ref 3.5–5)
POTASSIUM SERPL-SCNC: 4.9 MMOL/L (ref 3.5–5)
PRO-BNP: 1021 PG/ML (ref 0–450)
PROCALCITONIN: 0.05 NG/ML (ref 0–0.08)
PROCALCITONIN: 0.16 NG/ML (ref 0–0.08)
PROCALCITONIN: 0.75 NG/ML (ref 0–0.08)
PROTEIN FLUID: 2.5 G/DL
PROTEIN UA: 30 MG/DL
PROTEIN UA: 30 MG/DL
PROTHROMBIN TIME: 11.7 SEC (ref 9.3–12.4)
PROTHROMBIN TIME: 12.4 SEC (ref 9.3–12.4)
PROTHROMBIN TIME: 13.4 SEC (ref 9.3–12.4)
PROTHROMBIN TIME: 14 SEC (ref 9.3–12.4)
PROTHROMBIN TIME: 14.3 SEC (ref 9.3–12.4)
PROTHROMBIN TIME: 16.4 SEC (ref 9.3–12.4)
PROTHROMBIN TIME: 20.3 SEC (ref 9.3–12.4)
PROTHROMBIN TIME: 23.1 SEC (ref 9.3–12.4)
PROTHROMBIN TIME: 23.2 SEC (ref 9.3–12.4)
PROTHROMBIN TIME: 27.2 SEC (ref 9.3–12.4)
PROTHROMBIN TIME: 28.1 SEC (ref 9.3–12.4)
PROTHROMBIN TIME: 28.3 SEC (ref 9.3–12.4)
PROTHROMBIN TIME: 28.7 SEC (ref 9.3–12.4)
PROTHROMBIN TIME: 30.5 SEC (ref 9.3–12.4)
PROTHROMBIN TIME: 38.5 SEC (ref 9.3–12.4)
PROTHROMBIN TIME: 40.4 SEC (ref 9.3–12.4)
PROTHROMBIN TIME: 55.2 SEC (ref 9.3–12.4)
PROTHROMBIN TIME: 60.9 SEC (ref 9.3–12.4)
PROTHROMBIN TIME: 64.7 SEC (ref 9.3–12.4)
PROTHROMBIN TIME: ABNORMAL SEC (ref 9.3–12.4)
PS: 10 CMH20
RBC # BLD: 2.56 E12/L (ref 3.5–5.5)
RBC # BLD: 2.87 E12/L (ref 3.5–5.5)
RBC # BLD: 2.97 E12/L (ref 3.5–5.5)
RBC # BLD: 3 E12/L (ref 3.5–5.5)
RBC # BLD: 3.1 E12/L (ref 3.5–5.5)
RBC # BLD: 3.15 E12/L (ref 3.5–5.5)
RBC # BLD: 3.18 E12/L (ref 3.5–5.5)
RBC # BLD: 3.21 E12/L (ref 3.5–5.5)
RBC # BLD: 3.22 E12/L (ref 3.5–5.5)
RBC # BLD: 3.24 E12/L (ref 3.5–5.5)
RBC # BLD: 3.24 E12/L (ref 3.5–5.5)
RBC # BLD: 3.3 E12/L (ref 3.5–5.5)
RBC # BLD: 3.42 E12/L (ref 3.5–5.5)
RBC # BLD: 3.46 E12/L (ref 3.5–5.5)
RBC # BLD: 3.47 E12/L (ref 3.5–5.5)
RBC # BLD: 3.54 E12/L (ref 3.5–5.5)
RBC # BLD: 3.58 E12/L (ref 3.5–5.5)
RBC # BLD: 3.8 E12/L (ref 3.5–5.5)
RBC # BLD: 4.25 E12/L (ref 3.5–5.5)
RBC FLUID: <2000 /UL
RBC UA: ABNORMAL /HPF (ref 0–2)
RBC UA: NORMAL /HPF (ref 0–2)
REASON FOR REJECTION: NORMAL
REASON FOR REJECTION: NORMAL
REJECTED TEST: NORMAL
REJECTED TEST: NORMAL
RI(T): 0.51
RI(T): 0.52
RI(T): 0.71
RI(T): 0.79
RI(T): 0.8
RI(T): 0.85
RI(T): 0.87
RI(T): 0.88
RI(T): 1.11
RI(T): 1.34
RI(T): 1.68
RI(T): 10 %
RI(T): 155 %
RR MECHANICAL: 10 B/MIN
RR MECHANICAL: 16 B/MIN
SCHISTOCYTES: ABNORMAL
SMEAR, RESPIRATORY: ABNORMAL
SODIUM BLD-SCNC: 133 MMOL/L (ref 132–146)
SODIUM BLD-SCNC: 134 MMOL/L (ref 132–146)
SODIUM BLD-SCNC: 137 MMOL/L (ref 132–146)
SODIUM BLD-SCNC: 138 MMOL/L (ref 132–146)
SODIUM BLD-SCNC: 139 MMOL/L (ref 132–146)
SODIUM BLD-SCNC: 139 MMOL/L (ref 132–146)
SODIUM BLD-SCNC: 142 MMOL/L (ref 132–146)
SODIUM BLD-SCNC: 143 MMOL/L (ref 132–146)
SODIUM BLD-SCNC: 144 MMOL/L (ref 132–146)
SODIUM BLD-SCNC: 144 MMOL/L (ref 132–146)
SODIUM BLD-SCNC: 145 MMOL/L (ref 132–146)
SODIUM BLD-SCNC: 145 MMOL/L (ref 132–146)
SODIUM BLD-SCNC: 146 MMOL/L (ref 132–146)
SODIUM BLD-SCNC: 147 MMOL/L (ref 132–146)
SODIUM BLD-SCNC: 149 MMOL/L (ref 132–146)
SODIUM BLD-SCNC: 152 MMOL/L (ref 132–146)
SODIUM BLD-SCNC: 154 MMOL/L (ref 132–146)
SODIUM BLD-SCNC: 157 MMOL/L (ref 132–146)
SODIUM BLD-SCNC: 159 MMOL/L (ref 132–146)
SOURCE, BLOOD GAS: ABNORMAL
SPECIFIC GRAVITY UA: 1.02 (ref 1–1.03)
SPECIFIC GRAVITY UA: 1.02 (ref 1–1.03)
TARGET CELLS: ABNORMAL
TEAR DROP CELLS: ABNORMAL
THB: 10.1 G/DL (ref 11.5–16.5)
THB: 10.2 G/DL (ref 11.5–16.5)
THB: 10.4 G/DL (ref 11.5–16.5)
THB: 10.5 G/DL (ref 11.5–16.5)
THB: 10.6 G/DL (ref 11.5–16.5)
THB: 10.6 G/DL (ref 11.5–16.5)
THB: 11 G/DL (ref 11.5–16.5)
THB: 11.1 G/DL (ref 11.5–16.5)
THB: 11.2 G/DL (ref 11.5–16.5)
THB: 12.9 G/DL (ref 11.5–16.5)
THB: 9.8 G/DL (ref 11.5–16.5)
THB: 9.8 G/DL (ref 11.5–16.5)
THB: 9.9 G/DL (ref 11.5–16.5)
TIME ANALYZED: 1007
TIME ANALYZED: 1239
TIME ANALYZED: 1604
TIME ANALYZED: 309
TIME ANALYZED: 419
TIME ANALYZED: 443
TIME ANALYZED: 453
TIME ANALYZED: 500
TIME ANALYZED: 535
TIME ANALYZED: 540
TIME ANALYZED: 607
TIME ANALYZED: 608
TIME ANALYZED: 649
TIME ANALYZED: 707
TIME ANALYZED: 804
TOTAL PROTEIN: 5.6 G/DL (ref 6.4–8.3)
TOTAL PROTEIN: 5.7 G/DL (ref 6.4–8.3)
TOTAL PROTEIN: 5.7 G/DL (ref 6.4–8.3)
TOTAL PROTEIN: 5.8 G/DL (ref 6.4–8.3)
TOTAL PROTEIN: 5.9 G/DL (ref 6.4–8.3)
TOTAL PROTEIN: 6 G/DL (ref 6.4–8.3)
TOTAL PROTEIN: 6 G/DL (ref 6.4–8.3)
TOTAL PROTEIN: 6.1 G/DL (ref 6.4–8.3)
TOTAL PROTEIN: 6.2 G/DL (ref 6.4–8.3)
TOTAL PROTEIN: 6.2 G/DL (ref 6.4–8.3)
TOTAL PROTEIN: 6.5 G/DL (ref 6.4–8.3)
TOTAL PROTEIN: 7.4 G/DL (ref 6.4–8.3)
TRIGLYCERIDES FLUID: 12 MG/DL
TROPONIN: 0.04 NG/ML (ref 0–0.03)
TROPONIN: 0.09 NG/ML (ref 0–0.03)
TROPONIN: 0.23 NG/ML (ref 0–0.03)
TROPONIN: <0.01 NG/ML (ref 0–0.03)
UROBILINOGEN, URINE: 0.2 E.U./DL
UROBILINOGEN, URINE: 0.2 E.U./DL
VT MECHANICAL: 350 ML
VT MECHANICAL: 380 ML
VT MECHANICAL: 400 ML
VT MECHANICAL: 420 ML
VT MECHANICAL: 420 ML
WBC # BLD: 10.1 E9/L (ref 4.5–11.5)
WBC # BLD: 10.2 E9/L (ref 4.5–11.5)
WBC # BLD: 10.7 E9/L (ref 4.5–11.5)
WBC # BLD: 12 E9/L (ref 4.5–11.5)
WBC # BLD: 13.2 E9/L (ref 4.5–11.5)
WBC # BLD: 13.6 E9/L (ref 4.5–11.5)
WBC # BLD: 15.3 E9/L (ref 4.5–11.5)
WBC # BLD: 16.5 E9/L (ref 4.5–11.5)
WBC # BLD: 16.8 E9/L (ref 4.5–11.5)
WBC # BLD: 17.3 E9/L (ref 4.5–11.5)
WBC # BLD: 18.1 E9/L (ref 4.5–11.5)
WBC # BLD: 3.5 E9/L (ref 4.5–11.5)
WBC # BLD: 5.8 E9/L (ref 4.5–11.5)
WBC # BLD: 7 E9/L (ref 4.5–11.5)
WBC # BLD: 7 E9/L (ref 4.5–11.5)
WBC # BLD: 7.1 E9/L (ref 4.5–11.5)
WBC # BLD: 8.7 E9/L (ref 4.5–11.5)
WBC # BLD: 8.7 E9/L (ref 4.5–11.5)
WBC # BLD: 9.3 E9/L (ref 4.5–11.5)
WBC UA: ABNORMAL /HPF (ref 0–5)
WBC UA: NORMAL /HPF (ref 0–5)

## 2019-01-01 PROCEDURE — 71045 X-RAY EXAM CHEST 1 VIEW: CPT

## 2019-01-01 PROCEDURE — 6370000000 HC RX 637 (ALT 250 FOR IP): Performed by: SURGERY

## 2019-01-01 PROCEDURE — 80053 COMPREHEN METABOLIC PANEL: CPT

## 2019-01-01 PROCEDURE — 84145 PROCALCITONIN (PCT): CPT

## 2019-01-01 PROCEDURE — 5A1955Z RESPIRATORY VENTILATION, GREATER THAN 96 CONSECUTIVE HOURS: ICD-10-PCS | Performed by: EMERGENCY MEDICINE

## 2019-01-01 PROCEDURE — 2700000000 HC OXYGEN THERAPY PER DAY

## 2019-01-01 PROCEDURE — 36415 COLL VENOUS BLD VENIPUNCTURE: CPT

## 2019-01-01 PROCEDURE — 99223 1ST HOSP IP/OBS HIGH 75: CPT | Performed by: INTERNAL MEDICINE

## 2019-01-01 PROCEDURE — 84478 ASSAY OF TRIGLYCERIDES: CPT

## 2019-01-01 PROCEDURE — 2500000003 HC RX 250 WO HCPCS: Performed by: STUDENT IN AN ORGANIZED HEALTH CARE EDUCATION/TRAINING PROGRAM

## 2019-01-01 PROCEDURE — 97530 THERAPEUTIC ACTIVITIES: CPT

## 2019-01-01 PROCEDURE — 6360000002 HC RX W HCPCS

## 2019-01-01 PROCEDURE — 6360000002 HC RX W HCPCS: Performed by: INTERNAL MEDICINE

## 2019-01-01 PROCEDURE — 93306 TTE W/DOPPLER COMPLETE: CPT

## 2019-01-01 PROCEDURE — 32555 ASPIRATE PLEURA W/ IMAGING: CPT

## 2019-01-01 PROCEDURE — 97163 PT EVAL HIGH COMPLEX 45 MIN: CPT

## 2019-01-01 PROCEDURE — 85025 COMPLETE CBC W/AUTO DIFF WBC: CPT

## 2019-01-01 PROCEDURE — 6360000002 HC RX W HCPCS: Performed by: STUDENT IN AN ORGANIZED HEALTH CARE EDUCATION/TRAINING PROGRAM

## 2019-01-01 PROCEDURE — 36592 COLLECT BLOOD FROM PICC: CPT

## 2019-01-01 PROCEDURE — 83735 ASSAY OF MAGNESIUM: CPT

## 2019-01-01 PROCEDURE — 6370000000 HC RX 637 (ALT 250 FOR IP): Performed by: INTERNAL MEDICINE

## 2019-01-01 PROCEDURE — APPSS180 APP SPLIT SHARED TIME > 60 MINUTES: Performed by: NURSE PRACTITIONER

## 2019-01-01 PROCEDURE — 93005 ELECTROCARDIOGRAM TRACING: CPT | Performed by: INTERNAL MEDICINE

## 2019-01-01 PROCEDURE — 94668 MNPJ CHEST WALL SBSQ: CPT

## 2019-01-01 PROCEDURE — 89051 BODY FLUID CELL COUNT: CPT

## 2019-01-01 PROCEDURE — 2580000003 HC RX 258: Performed by: INTERNAL MEDICINE

## 2019-01-01 PROCEDURE — 74230 X-RAY XM SWLNG FUNCJ C+: CPT

## 2019-01-01 PROCEDURE — 3700000000 HC ANESTHESIA ATTENDED CARE: Performed by: SURGERY

## 2019-01-01 PROCEDURE — 85610 PROTHROMBIN TIME: CPT

## 2019-01-01 PROCEDURE — C9113 INJ PANTOPRAZOLE SODIUM, VIA: HCPCS | Performed by: INTERNAL MEDICINE

## 2019-01-01 PROCEDURE — 86900 BLOOD TYPING SEROLOGIC ABO: CPT

## 2019-01-01 PROCEDURE — 94760 N-INVAS EAR/PLS OXIMETRY 1: CPT

## 2019-01-01 PROCEDURE — 94660 CPAP INITIATION&MGMT: CPT

## 2019-01-01 PROCEDURE — 80048 BASIC METABOLIC PNL TOTAL CA: CPT

## 2019-01-01 PROCEDURE — C9113 INJ PANTOPRAZOLE SODIUM, VIA: HCPCS | Performed by: SURGERY

## 2019-01-01 PROCEDURE — 2000000000 HC ICU R&B

## 2019-01-01 PROCEDURE — 82805 BLOOD GASES W/O2 SATURATION: CPT

## 2019-01-01 PROCEDURE — 31500 INSERT EMERGENCY AIRWAY: CPT

## 2019-01-01 PROCEDURE — 86923 COMPATIBILITY TEST ELECTRIC: CPT

## 2019-01-01 PROCEDURE — 88305 TISSUE EXAM BY PATHOLOGIST: CPT

## 2019-01-01 PROCEDURE — 36600 WITHDRAWAL OF ARTERIAL BLOOD: CPT

## 2019-01-01 PROCEDURE — 2580000003 HC RX 258: Performed by: EMERGENCY MEDICINE

## 2019-01-01 PROCEDURE — 94640 AIRWAY INHALATION TREATMENT: CPT

## 2019-01-01 PROCEDURE — 85014 HEMATOCRIT: CPT

## 2019-01-01 PROCEDURE — 2500000003 HC RX 250 WO HCPCS: Performed by: INTERNAL MEDICINE

## 2019-01-01 PROCEDURE — 36430 TRANSFUSION BLD/BLD COMPNT: CPT

## 2019-01-01 PROCEDURE — 6360000002 HC RX W HCPCS: Performed by: SURGERY

## 2019-01-01 PROCEDURE — 2500000003 HC RX 250 WO HCPCS: Performed by: SURGERY

## 2019-01-01 PROCEDURE — 94667 MNPJ CHEST WALL 1ST: CPT

## 2019-01-01 PROCEDURE — 84100 ASSAY OF PHOSPHORUS: CPT

## 2019-01-01 PROCEDURE — 6370000000 HC RX 637 (ALT 250 FOR IP): Performed by: STUDENT IN AN ORGANIZED HEALTH CARE EDUCATION/TRAINING PROGRAM

## 2019-01-01 PROCEDURE — 88112 CYTOPATH CELL ENHANCE TECH: CPT

## 2019-01-01 PROCEDURE — 87798 DETECT AGENT NOS DNA AMP: CPT

## 2019-01-01 PROCEDURE — 71275 CT ANGIOGRAPHY CHEST: CPT

## 2019-01-01 PROCEDURE — 94002 VENT MGMT INPAT INIT DAY: CPT

## 2019-01-01 PROCEDURE — 85018 HEMOGLOBIN: CPT

## 2019-01-01 PROCEDURE — 84132 ASSAY OF SERUM POTASSIUM: CPT

## 2019-01-01 PROCEDURE — 2060000000 HC ICU INTERMEDIATE R&B

## 2019-01-01 PROCEDURE — 7100000001 HC PACU RECOVERY - ADDTL 15 MIN: Performed by: SURGERY

## 2019-01-01 PROCEDURE — 6370000000 HC RX 637 (ALT 250 FOR IP): Performed by: EMERGENCY MEDICINE

## 2019-01-01 PROCEDURE — 82565 ASSAY OF CREATININE: CPT

## 2019-01-01 PROCEDURE — 84484 ASSAY OF TROPONIN QUANT: CPT

## 2019-01-01 PROCEDURE — 87581 M.PNEUMON DNA AMP PROBE: CPT

## 2019-01-01 PROCEDURE — 97165 OT EVAL LOW COMPLEX 30 MIN: CPT

## 2019-01-01 PROCEDURE — 6360000002 HC RX W HCPCS: Performed by: EMERGENCY MEDICINE

## 2019-01-01 PROCEDURE — 6360000002 HC RX W HCPCS: Performed by: NURSE PRACTITIONER

## 2019-01-01 PROCEDURE — 94003 VENT MGMT INPAT SUBQ DAY: CPT

## 2019-01-01 PROCEDURE — 87040 BLOOD CULTURE FOR BACTERIA: CPT

## 2019-01-01 PROCEDURE — 0BH17EZ INSERTION OF ENDOTRACHEAL AIRWAY INTO TRACHEA, VIA NATURAL OR ARTIFICIAL OPENING: ICD-10-PCS | Performed by: EMERGENCY MEDICINE

## 2019-01-01 PROCEDURE — 7100000000 HC PACU RECOVERY - FIRST 15 MIN: Performed by: SURGERY

## 2019-01-01 PROCEDURE — 82962 GLUCOSE BLOOD TEST: CPT

## 2019-01-01 PROCEDURE — 92611 MOTION FLUOROSCOPY/SWALLOW: CPT

## 2019-01-01 PROCEDURE — 99232 SBSQ HOSP IP/OBS MODERATE 35: CPT | Performed by: INTERNAL MEDICINE

## 2019-01-01 PROCEDURE — 93005 ELECTROCARDIOGRAM TRACING: CPT | Performed by: EMERGENCY MEDICINE

## 2019-01-01 PROCEDURE — 82330 ASSAY OF CALCIUM: CPT

## 2019-01-01 PROCEDURE — P9016 RBC LEUKOCYTES REDUCED: HCPCS

## 2019-01-01 PROCEDURE — 85027 COMPLETE CBC AUTOMATED: CPT

## 2019-01-01 PROCEDURE — 02HV33Z INSERTION OF INFUSION DEVICE INTO SUPERIOR VENA CAVA, PERCUTANEOUS APPROACH: ICD-10-PCS | Performed by: SURGERY

## 2019-01-01 PROCEDURE — 94770 HC ETCO2 MONITOR DAILY: CPT

## 2019-01-01 PROCEDURE — 2580000003 HC RX 258: Performed by: STUDENT IN AN ORGANIZED HEALTH CARE EDUCATION/TRAINING PROGRAM

## 2019-01-01 PROCEDURE — 51702 INSERT TEMP BLADDER CATH: CPT

## 2019-01-01 PROCEDURE — 2580000003 HC RX 258: Performed by: SURGERY

## 2019-01-01 PROCEDURE — 87633 RESP VIRUS 12-25 TARGETS: CPT

## 2019-01-01 PROCEDURE — 86850 RBC ANTIBODY SCREEN: CPT

## 2019-01-01 PROCEDURE — 99285 EMERGENCY DEPT VISIT HI MDM: CPT

## 2019-01-01 PROCEDURE — 83880 ASSAY OF NATRIURETIC PEPTIDE: CPT

## 2019-01-01 PROCEDURE — 2709999900 HC NON-CHARGEABLE SUPPLY: Performed by: SURGERY

## 2019-01-01 PROCEDURE — 83615 LACTATE (LD) (LDH) ENZYME: CPT

## 2019-01-01 PROCEDURE — 87206 SMEAR FLUORESCENT/ACID STAI: CPT

## 2019-01-01 PROCEDURE — 86901 BLOOD TYPING SEROLOGIC RH(D): CPT

## 2019-01-01 PROCEDURE — 94664 DEMO&/EVAL PT USE INHALER: CPT

## 2019-01-01 PROCEDURE — 99213 OFFICE O/P EST LOW 20 MIN: CPT | Performed by: INTERNAL MEDICINE

## 2019-01-01 PROCEDURE — 93010 ELECTROCARDIOGRAM REPORT: CPT | Performed by: INTERNAL MEDICINE

## 2019-01-01 PROCEDURE — 81001 URINALYSIS AUTO W/SCOPE: CPT

## 2019-01-01 PROCEDURE — 84157 ASSAY OF PROTEIN OTHER: CPT

## 2019-01-01 PROCEDURE — 92610 EVALUATE SWALLOWING FUNCTION: CPT

## 2019-01-01 PROCEDURE — 82947 ASSAY GLUCOSE BLOOD QUANT: CPT

## 2019-01-01 PROCEDURE — 84295 ASSAY OF SERUM SODIUM: CPT

## 2019-01-01 PROCEDURE — 74018 RADEX ABDOMEN 1 VIEW: CPT

## 2019-01-01 PROCEDURE — 87186 SC STD MICRODIL/AGAR DIL: CPT

## 2019-01-01 PROCEDURE — 93000 ELECTROCARDIOGRAM COMPLETE: CPT | Performed by: INTERNAL MEDICINE

## 2019-01-01 PROCEDURE — 3700000001 HC ADD 15 MINUTES (ANESTHESIA): Performed by: SURGERY

## 2019-01-01 PROCEDURE — 97535 SELF CARE MNGMENT TRAINING: CPT

## 2019-01-01 PROCEDURE — 87081 CULTURE SCREEN ONLY: CPT

## 2019-01-01 PROCEDURE — 2500000003 HC RX 250 WO HCPCS: Performed by: EMERGENCY MEDICINE

## 2019-01-01 PROCEDURE — 83605 ASSAY OF LACTIC ACID: CPT

## 2019-01-01 PROCEDURE — 82435 ASSAY OF BLOOD CHLORIDE: CPT

## 2019-01-01 PROCEDURE — 99232 SBSQ HOSP IP/OBS MODERATE 35: CPT | Performed by: NURSE PRACTITIONER

## 2019-01-01 PROCEDURE — 2580000003 HC RX 258: Performed by: RADIOLOGY

## 2019-01-01 PROCEDURE — 87070 CULTURE OTHR SPECIMN AEROBIC: CPT

## 2019-01-01 PROCEDURE — 85018 HEMOGLOBIN: CPT | Performed by: INTERNAL MEDICINE

## 2019-01-01 PROCEDURE — 93005 ELECTROCARDIOGRAM TRACING: CPT | Performed by: STUDENT IN AN ORGANIZED HEALTH CARE EDUCATION/TRAINING PROGRAM

## 2019-01-01 PROCEDURE — 87077 CULTURE AEROBIC IDENTIFY: CPT

## 2019-01-01 PROCEDURE — 87502 INFLUENZA DNA AMP PROBE: CPT

## 2019-01-01 PROCEDURE — 1200000000 HC SEMI PRIVATE

## 2019-01-01 PROCEDURE — 36556 INSERT NON-TUNNEL CV CATH: CPT | Performed by: INTERNAL MEDICINE

## 2019-01-01 PROCEDURE — 2500000003 HC RX 250 WO HCPCS

## 2019-01-01 PROCEDURE — 0W993ZZ DRAINAGE OF RIGHT PLEURAL CAVITY, PERCUTANEOUS APPROACH: ICD-10-PCS | Performed by: INTERNAL MEDICINE

## 2019-01-01 PROCEDURE — 92526 ORAL FUNCTION THERAPY: CPT

## 2019-01-01 PROCEDURE — 0DH63UZ INSERTION OF FEEDING DEVICE INTO STOMACH, PERCUTANEOUS APPROACH: ICD-10-PCS | Performed by: SURGERY

## 2019-01-01 PROCEDURE — 99231 SBSQ HOSP IP/OBS SF/LOW 25: CPT | Performed by: NURSE PRACTITIONER

## 2019-01-01 PROCEDURE — 99221 1ST HOSP IP/OBS SF/LOW 40: CPT | Performed by: NURSE PRACTITIONER

## 2019-01-01 PROCEDURE — 99214 OFFICE O/P EST MOD 30 MIN: CPT | Performed by: INTERNAL MEDICINE

## 2019-01-01 PROCEDURE — 99233 SBSQ HOSP IP/OBS HIGH 50: CPT | Performed by: INTERNAL MEDICINE

## 2019-01-01 PROCEDURE — 97161 PT EVAL LOW COMPLEX 20 MIN: CPT

## 2019-01-01 PROCEDURE — 2580000003 HC RX 258

## 2019-01-01 PROCEDURE — 96365 THER/PROPH/DIAG IV INF INIT: CPT

## 2019-01-01 PROCEDURE — 99291 CRITICAL CARE FIRST HOUR: CPT | Performed by: INTERNAL MEDICINE

## 2019-01-01 PROCEDURE — 3609018000 HC EGD ESOPHAGOGASTRODUODENOSCOPY PEG TUBE INSERTION: Performed by: SURGERY

## 2019-01-01 PROCEDURE — 6360000002 HC RX W HCPCS: Performed by: NURSE ANESTHETIST, CERTIFIED REGISTERED

## 2019-01-01 PROCEDURE — 70450 CT HEAD/BRAIN W/O DYE: CPT

## 2019-01-01 PROCEDURE — 87486 CHLMYD PNEUM DNA AMP PROBE: CPT

## 2019-01-01 PROCEDURE — 87205 SMEAR GRAM STAIN: CPT

## 2019-01-01 PROCEDURE — 2140000000 HC CCU INTERMEDIATE R&B

## 2019-01-01 PROCEDURE — 82378 CARCINOEMBRYONIC ANTIGEN: CPT

## 2019-01-01 PROCEDURE — 89220 SPUTUM SPECIMEN COLLECTION: CPT

## 2019-01-01 PROCEDURE — 83986 ASSAY PH BODY FLUID NOS: CPT

## 2019-01-01 PROCEDURE — 6360000004 HC RX CONTRAST MEDICATION: Performed by: RADIOLOGY

## 2019-01-01 PROCEDURE — 97167 OT EVAL HIGH COMPLEX 60 MIN: CPT

## 2019-01-01 RX ORDER — BUDESONIDE 0.5 MG/2ML
1 INHALANT ORAL 2 TIMES DAILY
Status: DISCONTINUED | OUTPATIENT
Start: 2019-01-01 | End: 2019-01-01 | Stop reason: HOSPADM

## 2019-01-01 RX ORDER — POTASSIUM CHLORIDE 7.45 MG/ML
INJECTION INTRAVENOUS
Status: DISPENSED
Start: 2019-01-01 | End: 2019-01-01

## 2019-01-01 RX ORDER — FENTANYL CITRATE 50 UG/ML
INJECTION, SOLUTION INTRAMUSCULAR; INTRAVENOUS
Status: COMPLETED
Start: 2019-01-01 | End: 2019-01-01

## 2019-01-01 RX ORDER — DOCUSATE SODIUM 100 MG/1
100 CAPSULE, LIQUID FILLED ORAL DAILY
Status: DISCONTINUED | OUTPATIENT
Start: 2019-01-01 | End: 2019-01-01 | Stop reason: SDUPTHER

## 2019-01-01 RX ORDER — LIDOCAINE HYDROCHLORIDE 10 MG/ML
5 INJECTION, SOLUTION EPIDURAL; INFILTRATION; INTRACAUDAL; PERINEURAL ONCE
Status: DISCONTINUED | OUTPATIENT
Start: 2019-01-01 | End: 2019-01-01

## 2019-01-01 RX ORDER — METHYLPREDNISOLONE SODIUM SUCCINATE 40 MG/ML
40 INJECTION, POWDER, LYOPHILIZED, FOR SOLUTION INTRAMUSCULAR; INTRAVENOUS EVERY 8 HOURS
Status: DISCONTINUED | OUTPATIENT
Start: 2019-01-01 | End: 2019-01-01

## 2019-01-01 RX ORDER — PANTOPRAZOLE SODIUM 40 MG/10ML
40 INJECTION, POWDER, LYOPHILIZED, FOR SOLUTION INTRAVENOUS DAILY
Status: DISCONTINUED | OUTPATIENT
Start: 2019-01-01 | End: 2019-01-01

## 2019-01-01 RX ORDER — MORPHINE SULFATE 2 MG/ML
1 INJECTION, SOLUTION INTRAMUSCULAR; INTRAVENOUS ONCE
Status: COMPLETED | OUTPATIENT
Start: 2019-01-01 | End: 2019-01-01

## 2019-01-01 RX ORDER — MAGNESIUM SULFATE IN WATER 40 MG/ML
2 INJECTION, SOLUTION INTRAVENOUS ONCE
Status: COMPLETED | OUTPATIENT
Start: 2019-01-01 | End: 2019-01-01

## 2019-01-01 RX ORDER — DIGOXIN 0.25 MG/ML
125 INJECTION INTRAMUSCULAR; INTRAVENOUS ONCE
Status: COMPLETED | OUTPATIENT
Start: 2019-01-01 | End: 2019-01-01

## 2019-01-01 RX ORDER — BUDESONIDE 0.5 MG/2ML
1 INHALANT ORAL 2 TIMES DAILY
Status: DISCONTINUED | OUTPATIENT
Start: 2019-01-01 | End: 2019-01-01

## 2019-01-01 RX ORDER — POTASSIUM CHLORIDE 20 MEQ/1
40 TABLET, EXTENDED RELEASE ORAL ONCE
Status: COMPLETED | OUTPATIENT
Start: 2019-01-01 | End: 2019-01-01

## 2019-01-01 RX ORDER — WARFARIN SODIUM 2 MG/1
2 TABLET ORAL DAILY
Qty: 30 TABLET | Refills: 0 | Status: SHIPPED | OUTPATIENT
Start: 2019-01-01

## 2019-01-01 RX ORDER — WARFARIN SODIUM 4 MG/1
4 TABLET ORAL NIGHTLY
Status: DISCONTINUED | OUTPATIENT
Start: 2019-01-01 | End: 2019-01-01

## 2019-01-01 RX ORDER — AMMONIUM LACTATE 12 G/100G
LOTION TOPICAL PRN
Status: ON HOLD | COMMUNITY
End: 2019-01-01 | Stop reason: HOSPADM

## 2019-01-01 RX ORDER — PROPOFOL 10 MG/ML
INJECTION, EMULSION INTRAVENOUS PRN
Status: DISCONTINUED | OUTPATIENT
Start: 2019-01-01 | End: 2019-01-01 | Stop reason: SDUPTHER

## 2019-01-01 RX ORDER — SODIUM CHLORIDE 0.9 % (FLUSH) 0.9 %
SYRINGE (ML) INJECTION
Status: COMPLETED
Start: 2019-01-01 | End: 2019-01-01

## 2019-01-01 RX ORDER — MAGNESIUM SULFATE 1 G/100ML
1 INJECTION INTRAVENOUS ONCE
Status: COMPLETED | OUTPATIENT
Start: 2019-01-01 | End: 2019-01-01

## 2019-01-01 RX ORDER — LACTOSE-REDUCED FOOD
237 LIQUID (ML) ORAL 3 TIMES DAILY
COMMUNITY

## 2019-01-01 RX ORDER — FENTANYL CITRATE 50 UG/ML
75 INJECTION, SOLUTION INTRAMUSCULAR; INTRAVENOUS ONCE
Status: COMPLETED | OUTPATIENT
Start: 2019-01-01 | End: 2019-01-01

## 2019-01-01 RX ORDER — LACTOSE-REDUCED FOOD
237 LIQUID (ML) ORAL 3 TIMES DAILY
Status: DISCONTINUED | OUTPATIENT
Start: 2019-01-01 | End: 2019-01-01

## 2019-01-01 RX ORDER — 0.9 % SODIUM CHLORIDE 0.9 %
500 INTRAVENOUS SOLUTION INTRAVENOUS ONCE
Status: COMPLETED | OUTPATIENT
Start: 2019-01-01 | End: 2019-01-01

## 2019-01-01 RX ORDER — SODIUM CHLORIDE 0.9 % (FLUSH) 0.9 %
10 SYRINGE (ML) INJECTION PRN
Status: DISCONTINUED | OUTPATIENT
Start: 2019-01-01 | End: 2019-01-01 | Stop reason: HOSPADM

## 2019-01-01 RX ORDER — METOPROLOL TARTRATE 50 MG/1
50 TABLET, FILM COATED ORAL 2 TIMES DAILY
Status: DISCONTINUED | OUTPATIENT
Start: 2019-01-01 | End: 2019-01-01

## 2019-01-01 RX ORDER — PREDNISONE 20 MG/1
20 TABLET ORAL 2 TIMES DAILY
Qty: 10 TABLET | Refills: 0 | DISCHARGE
Start: 2019-01-01 | End: 2019-01-01

## 2019-01-01 RX ORDER — LORAZEPAM 0.5 MG/1
0.5 TABLET ORAL DAILY
Qty: 30 TABLET | Refills: 0 | Status: SHIPPED | OUTPATIENT
Start: 2019-01-01 | End: 2019-01-01 | Stop reason: CLARIF

## 2019-01-01 RX ORDER — HEPARIN SODIUM (PORCINE) LOCK FLUSH IV SOLN 100 UNIT/ML 100 UNIT/ML
3 SOLUTION INTRAVENOUS PRN
Status: DISCONTINUED | OUTPATIENT
Start: 2019-01-01 | End: 2019-01-01

## 2019-01-01 RX ORDER — ACETAMINOPHEN 325 MG/1
650 TABLET ORAL EVERY 4 HOURS PRN
Status: DISCONTINUED | OUTPATIENT
Start: 2019-01-01 | End: 2019-01-01 | Stop reason: HOSPADM

## 2019-01-01 RX ORDER — PROPOFOL 10 MG/ML
INJECTION, EMULSION INTRAVENOUS
Status: COMPLETED
Start: 2019-01-01 | End: 2019-01-01

## 2019-01-01 RX ORDER — ATORVASTATIN CALCIUM 10 MG/1
10 TABLET, FILM COATED ORAL NIGHTLY
Status: DISCONTINUED | OUTPATIENT
Start: 2019-01-01 | End: 2019-01-01 | Stop reason: HOSPADM

## 2019-01-01 RX ORDER — ROCURONIUM BROMIDE 10 MG/ML
INJECTION, SOLUTION INTRAVENOUS
Status: DISCONTINUED
Start: 2019-01-01 | End: 2019-01-01 | Stop reason: WASHOUT

## 2019-01-01 RX ORDER — WARFARIN SODIUM 3 MG/1
3 TABLET ORAL
Status: COMPLETED | OUTPATIENT
Start: 2019-01-01 | End: 2019-01-01

## 2019-01-01 RX ORDER — ACETAMINOPHEN 500 MG
500 TABLET ORAL EVERY 6 HOURS PRN
Status: DISCONTINUED | OUTPATIENT
Start: 2019-01-01 | End: 2019-01-01 | Stop reason: HOSPADM

## 2019-01-01 RX ORDER — LEVOTHYROXINE SODIUM 0.07 MG/1
75 TABLET ORAL DAILY
Qty: 30 TABLET | Refills: 0 | Status: SHIPPED | OUTPATIENT
Start: 2019-01-01

## 2019-01-01 RX ORDER — FAMOTIDINE 20 MG/1
20 TABLET, FILM COATED ORAL DAILY
Status: DISCONTINUED | OUTPATIENT
Start: 2019-01-01 | End: 2019-01-01

## 2019-01-01 RX ORDER — IPRATROPIUM BROMIDE AND ALBUTEROL SULFATE 2.5; .5 MG/3ML; MG/3ML
1 SOLUTION RESPIRATORY (INHALATION)
Status: DISCONTINUED | OUTPATIENT
Start: 2019-01-01 | End: 2019-01-01

## 2019-01-01 RX ORDER — ACETAMINOPHEN 160 MG/5ML
650 SUSPENSION, ORAL (FINAL DOSE FORM) ORAL EVERY 4 HOURS PRN
Qty: 240 ML | Refills: 0 | Status: SHIPPED | OUTPATIENT
Start: 2019-01-01

## 2019-01-01 RX ORDER — PROPOFOL 10 MG/ML
10 INJECTION, EMULSION INTRAVENOUS ONCE
Status: COMPLETED | OUTPATIENT
Start: 2019-01-01 | End: 2019-01-01

## 2019-01-01 RX ORDER — LEVOFLOXACIN 750 MG/1
750 TABLET ORAL EVERY OTHER DAY
Status: DISCONTINUED | OUTPATIENT
Start: 2019-01-01 | End: 2019-01-01

## 2019-01-01 RX ORDER — SODIUM CHLORIDE 9 MG/ML
INJECTION, SOLUTION INTRAVENOUS CONTINUOUS
Status: DISCONTINUED | OUTPATIENT
Start: 2019-01-01 | End: 2019-01-01 | Stop reason: HOSPADM

## 2019-01-01 RX ORDER — METHYLPREDNISOLONE SODIUM SUCCINATE 40 MG/ML
40 INJECTION, POWDER, LYOPHILIZED, FOR SOLUTION INTRAMUSCULAR; INTRAVENOUS DAILY
Status: ON HOLD | COMMUNITY
Start: 2019-01-01 | End: 2019-01-01 | Stop reason: HOSPADM

## 2019-01-01 RX ORDER — ASPIRIN 81 MG/1
81 TABLET ORAL DAILY
Status: DISCONTINUED | OUTPATIENT
Start: 2019-01-01 | End: 2019-01-01 | Stop reason: HOSPADM

## 2019-01-01 RX ORDER — WARFARIN SODIUM 4 MG/1
4 TABLET ORAL DAILY
Status: DISCONTINUED | OUTPATIENT
Start: 2019-01-01 | End: 2019-01-01

## 2019-01-01 RX ORDER — BUDESONIDE 0.5 MG/2ML
500 INHALANT ORAL 2 TIMES DAILY
Status: DISCONTINUED | OUTPATIENT
Start: 2019-01-01 | End: 2019-01-01 | Stop reason: HOSPADM

## 2019-01-01 RX ORDER — CEFAZOLIN SODIUM 1 G/3ML
INJECTION, POWDER, FOR SOLUTION INTRAMUSCULAR; INTRAVENOUS PRN
Status: DISCONTINUED | OUTPATIENT
Start: 2019-01-01 | End: 2019-01-01 | Stop reason: SDUPTHER

## 2019-01-01 RX ORDER — FORMOTEROL FUMARATE 20 UG/2ML
20 SOLUTION RESPIRATORY (INHALATION) 2 TIMES DAILY
Status: DISCONTINUED | OUTPATIENT
Start: 2019-01-01 | End: 2019-01-01 | Stop reason: HOSPADM

## 2019-01-01 RX ORDER — WARFARIN SODIUM 2 MG/1
2 TABLET ORAL
Status: DISPENSED | OUTPATIENT
Start: 2019-01-01 | End: 2019-01-01

## 2019-01-01 RX ORDER — FORMOTEROL FUMARATE 20 UG/2ML
20 SOLUTION RESPIRATORY (INHALATION) EVERY 12 HOURS
Status: DISCONTINUED | OUTPATIENT
Start: 2019-01-01 | End: 2019-01-01

## 2019-01-01 RX ORDER — FORMOTEROL FUMARATE 20 UG/2ML
20 SOLUTION RESPIRATORY (INHALATION) EVERY 12 HOURS
Status: DISCONTINUED | OUTPATIENT
Start: 2019-01-01 | End: 2019-01-01 | Stop reason: HOSPADM

## 2019-01-01 RX ORDER — ATORVASTATIN CALCIUM 10 MG/1
1 TABLET, FILM COATED ORAL DAILY
Status: CANCELLED | OUTPATIENT
Start: 2019-01-01

## 2019-01-01 RX ORDER — LEVETIRACETAM 500 MG/1
500 TABLET ORAL 2 TIMES DAILY
Status: DISCONTINUED | OUTPATIENT
Start: 2019-01-01 | End: 2019-01-01

## 2019-01-01 RX ORDER — SODIUM CHLORIDE 0.9 % (FLUSH) 0.9 %
10 SYRINGE (ML) INJECTION PRN
Status: CANCELLED | OUTPATIENT
Start: 2019-01-01

## 2019-01-01 RX ORDER — ETOMIDATE 2 MG/ML
INJECTION INTRAVENOUS
Status: DISPENSED
Start: 2019-01-01 | End: 2019-01-01

## 2019-01-01 RX ORDER — DOCUSATE SODIUM 100 MG/1
100 CAPSULE, LIQUID FILLED ORAL DAILY
Status: ON HOLD | COMMUNITY
End: 2019-01-01 | Stop reason: HOSPADM

## 2019-01-01 RX ORDER — 0.9 % SODIUM CHLORIDE 0.9 %
250 INTRAVENOUS SOLUTION INTRAVENOUS ONCE
Status: DISCONTINUED | OUTPATIENT
Start: 2019-01-01 | End: 2019-01-01 | Stop reason: HOSPADM

## 2019-01-01 RX ORDER — IPRATROPIUM BROMIDE AND ALBUTEROL SULFATE 2.5; .5 MG/3ML; MG/3ML
3 SOLUTION RESPIRATORY (INHALATION)
Qty: 360 ML | DISCHARGE
Start: 2019-01-01

## 2019-01-01 RX ORDER — PANTOPRAZOLE SODIUM 40 MG/1
40 GRANULE, DELAYED RELEASE ORAL
Qty: 30 EACH | Refills: 0 | Status: SHIPPED | OUTPATIENT
Start: 2019-01-01

## 2019-01-01 RX ORDER — SODIUM CHLORIDE 0.9 % (FLUSH) 0.9 %
10 SYRINGE (ML) INJECTION EVERY 12 HOURS SCHEDULED
Status: DISCONTINUED | OUTPATIENT
Start: 2019-01-01 | End: 2019-01-01 | Stop reason: HOSPADM

## 2019-01-01 RX ORDER — ONDANSETRON 2 MG/ML
4 INJECTION INTRAMUSCULAR; INTRAVENOUS EVERY 6 HOURS PRN
Status: CANCELLED | OUTPATIENT
Start: 2019-01-01

## 2019-01-01 RX ORDER — SODIUM CHLORIDE 450 MG/100ML
INJECTION, SOLUTION INTRAVENOUS CONTINUOUS
Status: DISCONTINUED | OUTPATIENT
Start: 2019-01-01 | End: 2019-01-01

## 2019-01-01 RX ORDER — SODIUM CHLORIDE 0.9 % (FLUSH) 0.9 %
10 SYRINGE (ML) INJECTION PRN
Status: DISCONTINUED | OUTPATIENT
Start: 2019-01-01 | End: 2019-01-01

## 2019-01-01 RX ORDER — ASPIRIN 81 MG/1
324 TABLET, CHEWABLE ORAL ONCE
Status: COMPLETED | OUTPATIENT
Start: 2019-01-01 | End: 2019-01-01

## 2019-01-01 RX ORDER — HEPARIN SODIUM (PORCINE) LOCK FLUSH IV SOLN 100 UNIT/ML 100 UNIT/ML
3 SOLUTION INTRAVENOUS EVERY 12 HOURS SCHEDULED
Status: DISCONTINUED | OUTPATIENT
Start: 2019-01-01 | End: 2019-01-01

## 2019-01-01 RX ORDER — ETOMIDATE 2 MG/ML
20 INJECTION INTRAVENOUS ONCE
Status: COMPLETED | OUTPATIENT
Start: 2019-01-01 | End: 2019-01-01

## 2019-01-01 RX ORDER — METHYLPREDNISOLONE SODIUM SUCCINATE 40 MG/ML
40 INJECTION, POWDER, LYOPHILIZED, FOR SOLUTION INTRAMUSCULAR; INTRAVENOUS EVERY 12 HOURS
Status: COMPLETED | OUTPATIENT
Start: 2019-01-01 | End: 2019-01-01

## 2019-01-01 RX ORDER — CHLORHEXIDINE GLUCONATE 0.12 MG/ML
15 RINSE ORAL 2 TIMES DAILY
Status: DISCONTINUED | OUTPATIENT
Start: 2019-01-01 | End: 2019-01-01

## 2019-01-01 RX ORDER — ONDANSETRON 2 MG/ML
4 INJECTION INTRAMUSCULAR; INTRAVENOUS EVERY 6 HOURS PRN
Status: DISCONTINUED | OUTPATIENT
Start: 2019-01-01 | End: 2019-01-01 | Stop reason: HOSPADM

## 2019-01-01 RX ORDER — POLYETHYLENE GLYCOL 3350 17 G/17G
17 POWDER, FOR SOLUTION ORAL DAILY
Status: DISCONTINUED | OUTPATIENT
Start: 2019-01-01 | End: 2019-01-01

## 2019-01-01 RX ORDER — SODIUM CHLORIDE 0.9 % (FLUSH) 0.9 %
10 SYRINGE (ML) INJECTION EVERY 12 HOURS SCHEDULED
Status: DISCONTINUED | OUTPATIENT
Start: 2019-01-01 | End: 2019-01-01

## 2019-01-01 RX ORDER — DOCUSATE SODIUM 100 MG/1
100 CAPSULE, LIQUID FILLED ORAL DAILY
Status: DISCONTINUED | OUTPATIENT
Start: 2019-01-01 | End: 2019-01-01 | Stop reason: HOSPADM

## 2019-01-01 RX ORDER — MORPHINE SULFATE 2 MG/ML
INJECTION, SOLUTION INTRAMUSCULAR; INTRAVENOUS
Status: COMPLETED
Start: 2019-01-01 | End: 2019-01-01

## 2019-01-01 RX ORDER — METOPROLOL TARTRATE 5 MG/5ML
2.5 INJECTION INTRAVENOUS EVERY 6 HOURS
Status: DISCONTINUED | OUTPATIENT
Start: 2019-01-01 | End: 2019-01-01

## 2019-01-01 RX ORDER — FUROSEMIDE 10 MG/ML
40 INJECTION INTRAMUSCULAR; INTRAVENOUS ONCE
Status: COMPLETED | OUTPATIENT
Start: 2019-01-01 | End: 2019-01-01

## 2019-01-01 RX ORDER — LEVOTHYROXINE SODIUM 0.07 MG/1
75 TABLET ORAL DAILY
Status: DISCONTINUED | OUTPATIENT
Start: 2019-01-01 | End: 2019-01-01 | Stop reason: HOSPADM

## 2019-01-01 RX ORDER — WARFARIN SODIUM 5 MG/1
2.5 TABLET ORAL
Status: COMPLETED | OUTPATIENT
Start: 2019-01-01 | End: 2019-01-01

## 2019-01-01 RX ORDER — WARFARIN SODIUM 2 MG/1
2 TABLET ORAL DAILY
Status: DISCONTINUED | OUTPATIENT
Start: 2019-01-01 | End: 2019-01-01 | Stop reason: HOSPADM

## 2019-01-01 RX ORDER — ONDANSETRON 2 MG/ML
4 INJECTION INTRAMUSCULAR; INTRAVENOUS EVERY 6 HOURS PRN
Status: DISCONTINUED | OUTPATIENT
Start: 2019-01-01 | End: 2019-01-01

## 2019-01-01 RX ORDER — OYSTER SHELL CALCIUM WITH VITAMIN D 500; 200 MG/1; [IU]/1
1 TABLET, FILM COATED ORAL DAILY
Status: DISCONTINUED | OUTPATIENT
Start: 2019-01-01 | End: 2019-01-01

## 2019-01-01 RX ORDER — PROPOFOL 10 MG/ML
10 INJECTION, EMULSION INTRAVENOUS
Status: DISCONTINUED | OUTPATIENT
Start: 2019-01-01 | End: 2019-01-01

## 2019-01-01 RX ORDER — LORAZEPAM 0.5 MG/1
0.25 TABLET ORAL NIGHTLY PRN
Qty: 3 TABLET | Refills: 0 | Status: SHIPPED | OUTPATIENT
Start: 2019-01-01 | End: 2019-01-01

## 2019-01-01 RX ORDER — PSEUDOEPHEDRINE HCL 30 MG
100 TABLET ORAL DAILY
Qty: 30 CAPSULE | Refills: 0 | Status: SHIPPED | OUTPATIENT
Start: 2019-01-01

## 2019-01-01 RX ORDER — PREDNISONE 20 MG/1
20 TABLET ORAL DAILY
Status: DISCONTINUED | OUTPATIENT
Start: 2019-01-01 | End: 2019-01-01 | Stop reason: HOSPADM

## 2019-01-01 RX ORDER — LORAZEPAM 0.5 MG/1
0.5 TABLET ORAL NIGHTLY
Status: ON HOLD | COMMUNITY
End: 2019-01-01

## 2019-01-01 RX ORDER — POTASSIUM CHLORIDE 7.45 MG/ML
10 INJECTION INTRAVENOUS
Status: COMPLETED | OUTPATIENT
Start: 2019-01-01 | End: 2019-01-01

## 2019-01-01 RX ORDER — PREDNISONE 10 MG/1
TABLET ORAL
Qty: 20 TABLET | Refills: 0 | Status: SHIPPED | OUTPATIENT
Start: 2019-01-01

## 2019-01-01 RX ORDER — CEFTRIAXONE 1 G/1
1 INJECTION, POWDER, FOR SOLUTION INTRAMUSCULAR; INTRAVENOUS EVERY 24 HOURS
Status: ON HOLD | COMMUNITY
Start: 2019-01-01 | End: 2019-01-01 | Stop reason: HOSPADM

## 2019-01-01 RX ORDER — SODIUM CHLORIDE 450 MG/100ML
INJECTION, SOLUTION INTRAVENOUS CONTINUOUS
Status: ACTIVE | OUTPATIENT
Start: 2019-01-01 | End: 2019-01-01

## 2019-01-01 RX ORDER — SODIUM CHLORIDE 0.9 % (FLUSH) 0.9 %
10 SYRINGE (ML) INJECTION EVERY 12 HOURS SCHEDULED
Status: CANCELLED | OUTPATIENT
Start: 2019-01-01

## 2019-01-01 RX ORDER — DOXYCYCLINE HYCLATE 100 MG
100 TABLET ORAL 2 TIMES DAILY
Status: ON HOLD | COMMUNITY
Start: 2019-01-01 | End: 2019-01-01 | Stop reason: HOSPADM

## 2019-01-01 RX ORDER — SODIUM CHLORIDE 0.9 % (FLUSH) 0.9 %
10 SYRINGE (ML) INJECTION ONCE
Status: COMPLETED | OUTPATIENT
Start: 2019-01-01 | End: 2019-01-01

## 2019-01-01 RX ORDER — DRONABINOL 2.5 MG/1
2.5 CAPSULE ORAL
Status: ON HOLD | COMMUNITY
End: 2019-01-01 | Stop reason: HOSPADM

## 2019-01-01 RX ORDER — POTASSIUM CHLORIDE 29.8 MG/ML
20 INJECTION INTRAVENOUS ONCE
Status: COMPLETED | OUTPATIENT
Start: 2019-01-01 | End: 2019-01-01

## 2019-01-01 RX ORDER — LORAZEPAM 0.5 MG/1
0.5 TABLET ORAL EVERY 8 HOURS PRN
Qty: 30 TABLET | Refills: 0 | Status: SHIPPED | OUTPATIENT
Start: 2019-01-01 | End: 2019-01-01 | Stop reason: HOSPADM

## 2019-01-01 RX ORDER — WARFARIN SODIUM 2.5 MG/1
2.5 TABLET ORAL
Status: COMPLETED | OUTPATIENT
Start: 2019-01-01 | End: 2019-01-01

## 2019-01-01 RX ORDER — PREDNISONE 20 MG/1
40 TABLET ORAL
Status: DISPENSED | OUTPATIENT
Start: 2019-01-01 | End: 2019-01-01

## 2019-01-01 RX ORDER — LIDOCAINE HYDROCHLORIDE 10 MG/ML
INJECTION, SOLUTION INFILTRATION; PERINEURAL PRN
Status: DISCONTINUED | OUTPATIENT
Start: 2019-01-01 | End: 2019-01-01 | Stop reason: ALTCHOICE

## 2019-01-01 RX ORDER — WARFARIN SODIUM 2 MG/1
2 TABLET ORAL DAILY
Status: DISCONTINUED | OUTPATIENT
Start: 2019-01-01 | End: 2019-01-01

## 2019-01-01 RX ORDER — GUAIFENESIN/DEXTROMETHORPHAN 100-10MG/5
5 SYRUP ORAL EVERY 4 HOURS PRN
Status: DISCONTINUED | OUTPATIENT
Start: 2019-01-01 | End: 2019-01-01 | Stop reason: HOSPADM

## 2019-01-01 RX ORDER — LORAZEPAM 2 MG/ML
INJECTION INTRAMUSCULAR
Status: DISPENSED
Start: 2019-01-01 | End: 2019-01-01

## 2019-01-01 RX ORDER — METHYLPREDNISOLONE SODIUM SUCCINATE 40 MG/ML
40 INJECTION, POWDER, LYOPHILIZED, FOR SOLUTION INTRAMUSCULAR; INTRAVENOUS EVERY 12 HOURS
Status: DISCONTINUED | OUTPATIENT
Start: 2019-01-01 | End: 2019-01-01

## 2019-01-01 RX ORDER — FAMOTIDINE 20 MG/1
20 TABLET, FILM COATED ORAL DAILY
Status: ON HOLD | COMMUNITY
End: 2019-01-01 | Stop reason: HOSPADM

## 2019-01-01 RX ORDER — WARFARIN SODIUM 4 MG/1
4 TABLET ORAL DAILY
COMMUNITY
Start: 2019-01-01

## 2019-01-01 RX ORDER — QUETIAPINE FUMARATE 25 MG/1
12.5 TABLET, FILM COATED ORAL NIGHTLY PRN
Status: DISCONTINUED | OUTPATIENT
Start: 2019-01-01 | End: 2019-01-01 | Stop reason: HOSPADM

## 2019-01-01 RX ORDER — SUCCINYLCHOLINE CHLORIDE 20 MG/ML
100 INJECTION INTRAMUSCULAR; INTRAVENOUS ONCE
Status: COMPLETED | OUTPATIENT
Start: 2019-01-01 | End: 2019-01-01

## 2019-01-01 RX ORDER — ATORVASTATIN CALCIUM 10 MG/1
10 TABLET, FILM COATED ORAL DAILY
Status: DISCONTINUED | OUTPATIENT
Start: 2019-01-01 | End: 2019-01-01 | Stop reason: HOSPADM

## 2019-01-01 RX ORDER — IPRATROPIUM BROMIDE AND ALBUTEROL SULFATE 2.5; .5 MG/3ML; MG/3ML
3 SOLUTION RESPIRATORY (INHALATION)
Status: DISCONTINUED | OUTPATIENT
Start: 2019-01-01 | End: 2019-01-01 | Stop reason: HOSPADM

## 2019-01-01 RX ORDER — SODIUM CHLORIDE 9 MG/ML
INJECTION, SOLUTION INTRAVENOUS CONTINUOUS
Status: DISCONTINUED | OUTPATIENT
Start: 2019-01-01 | End: 2019-01-01

## 2019-01-01 RX ORDER — WARFARIN SODIUM 2 MG/1
2 TABLET ORAL
Status: COMPLETED | OUTPATIENT
Start: 2019-01-01 | End: 2019-01-01

## 2019-01-01 RX ORDER — ASCORBIC ACID 500 MG
500 TABLET ORAL DAILY
Status: DISCONTINUED | OUTPATIENT
Start: 2019-01-01 | End: 2019-01-01

## 2019-01-01 RX ORDER — ATORVASTATIN CALCIUM 10 MG/1
10 TABLET, FILM COATED ORAL DAILY
Qty: 30 TABLET | Refills: 0 | Status: SHIPPED | OUTPATIENT
Start: 2019-01-01

## 2019-01-01 RX ORDER — AMMONIUM LACTATE 12 G/100G
LOTION TOPICAL PRN
Status: DISCONTINUED | OUTPATIENT
Start: 2019-01-01 | End: 2019-01-01

## 2019-01-01 RX ORDER — LEVOFLOXACIN 5 MG/ML
750 INJECTION, SOLUTION INTRAVENOUS
Status: DISCONTINUED | OUTPATIENT
Start: 2019-01-01 | End: 2019-01-01

## 2019-01-01 RX ORDER — DIGOXIN 0.25 MG/ML
250 INJECTION INTRAMUSCULAR; INTRAVENOUS ONCE
Status: COMPLETED | OUTPATIENT
Start: 2019-01-01 | End: 2019-01-01

## 2019-01-01 RX ORDER — IPRATROPIUM BROMIDE AND ALBUTEROL SULFATE 2.5; .5 MG/3ML; MG/3ML
1 SOLUTION RESPIRATORY (INHALATION)
Status: COMPLETED | OUTPATIENT
Start: 2019-01-01 | End: 2019-01-01

## 2019-01-01 RX ORDER — ASPIRIN 81 MG/1
81 TABLET, CHEWABLE ORAL DAILY
Qty: 30 TABLET | Refills: 0 | Status: SHIPPED | OUTPATIENT
Start: 2019-01-01

## 2019-01-01 RX ORDER — DOCUSATE SODIUM 100 MG/1
100 CAPSULE, LIQUID FILLED ORAL DAILY
Status: DISCONTINUED | OUTPATIENT
Start: 2019-01-01 | End: 2019-01-01

## 2019-01-01 RX ORDER — MIDAZOLAM HYDROCHLORIDE 1 MG/ML
INJECTION INTRAMUSCULAR; INTRAVENOUS
Status: COMPLETED
Start: 2019-01-01 | End: 2019-01-01

## 2019-01-01 RX ORDER — IPRATROPIUM BROMIDE AND ALBUTEROL SULFATE 2.5; .5 MG/3ML; MG/3ML
1 SOLUTION RESPIRATORY (INHALATION)
Status: DISCONTINUED | OUTPATIENT
Start: 2019-01-01 | End: 2019-01-01 | Stop reason: HOSPADM

## 2019-01-01 RX ORDER — GUAIFENESIN/DEXTROMETHORPHAN 100-10MG/5
5 SYRUP ORAL EVERY 4 HOURS PRN
Qty: 120 ML | DISCHARGE
Start: 2019-01-01 | End: 2019-01-01

## 2019-01-01 RX ORDER — POTASSIUM CHLORIDE 29.8 MG/ML
20 INJECTION INTRAVENOUS
Status: COMPLETED | OUTPATIENT
Start: 2019-01-01 | End: 2019-01-01

## 2019-01-01 RX ORDER — HYDRALAZINE HYDROCHLORIDE 20 MG/ML
10 INJECTION INTRAMUSCULAR; INTRAVENOUS EVERY 6 HOURS PRN
Status: DISCONTINUED | OUTPATIENT
Start: 2019-01-01 | End: 2019-01-01

## 2019-01-01 RX ORDER — PANTOPRAZOLE SODIUM 40 MG/1
40 GRANULE, DELAYED RELEASE ORAL
Status: DISCONTINUED | OUTPATIENT
Start: 2019-01-01 | End: 2019-01-01 | Stop reason: HOSPADM

## 2019-01-01 RX ORDER — 0.9 % SODIUM CHLORIDE 0.9 %
250 INTRAVENOUS SOLUTION INTRAVENOUS ONCE
Status: COMPLETED | OUTPATIENT
Start: 2019-01-01 | End: 2019-01-01

## 2019-01-01 RX ORDER — SODIUM CHLORIDE 9 MG/ML
INJECTION, SOLUTION INTRAVENOUS CONTINUOUS
Status: CANCELLED | OUTPATIENT
Start: 2019-01-01

## 2019-01-01 RX ORDER — DRONABINOL 2.5 MG/1
2.5 CAPSULE ORAL
Status: DISCONTINUED | OUTPATIENT
Start: 2019-01-01 | End: 2019-01-01

## 2019-01-01 RX ORDER — METOPROLOL TARTRATE 50 MG/1
50 TABLET, FILM COATED ORAL 2 TIMES DAILY
Status: DISCONTINUED | OUTPATIENT
Start: 2019-01-01 | End: 2019-01-01 | Stop reason: HOSPADM

## 2019-01-01 RX ORDER — WARFARIN SODIUM 2 MG/1
2 TABLET ORAL
Status: DISCONTINUED | OUTPATIENT
Start: 2019-01-01 | End: 2019-01-01 | Stop reason: SDUPTHER

## 2019-01-01 RX ORDER — LORAZEPAM 2 MG/ML
1 INJECTION INTRAMUSCULAR ONCE
Status: COMPLETED | OUTPATIENT
Start: 2019-01-01 | End: 2019-01-01

## 2019-01-01 RX ORDER — LORAZEPAM 0.5 MG/1
0.5 TABLET ORAL EVERY 6 HOURS PRN
COMMUNITY
End: 2019-01-01

## 2019-01-01 RX ORDER — NYSTATIN 100000 U/G
OINTMENT TOPICAL 2 TIMES DAILY
Status: DISCONTINUED | OUTPATIENT
Start: 2019-01-01 | End: 2019-01-01 | Stop reason: HOSPADM

## 2019-01-01 RX ORDER — FENTANYL CITRATE 50 UG/ML
50 INJECTION, SOLUTION INTRAMUSCULAR; INTRAVENOUS ONCE
Status: COMPLETED | OUTPATIENT
Start: 2019-01-01 | End: 2019-01-01

## 2019-01-01 RX ORDER — DOCUSATE SODIUM 50 MG/5ML
100 LIQUID ORAL DAILY
Status: DISCONTINUED | OUTPATIENT
Start: 2019-01-01 | End: 2019-01-01

## 2019-01-01 RX ORDER — QUETIAPINE FUMARATE 25 MG/1
25 TABLET, FILM COATED ORAL 2 TIMES DAILY
Status: DISCONTINUED | OUTPATIENT
Start: 2019-01-01 | End: 2019-01-01

## 2019-01-01 RX ORDER — POLYETHYLENE GLYCOL 3350 17 G/17G
17 POWDER, FOR SOLUTION ORAL DAILY
Status: ON HOLD | COMMUNITY
End: 2019-01-01 | Stop reason: HOSPADM

## 2019-01-01 RX ORDER — LINEZOLID 2 MG/ML
600 INJECTION, SOLUTION INTRAVENOUS EVERY 12 HOURS
Status: DISCONTINUED | OUTPATIENT
Start: 2019-01-01 | End: 2019-01-01

## 2019-01-01 RX ADMIN — POTASSIUM CHLORIDE 10 MEQ: 7.46 INJECTION, SOLUTION INTRAVENOUS at 11:34

## 2019-01-01 RX ADMIN — DOCUSATE SODIUM 100 MG: 100 CAPSULE, LIQUID FILLED ORAL at 09:22

## 2019-01-01 RX ADMIN — BUDESONIDE 500 MCG: 0.5 INHALANT RESPIRATORY (INHALATION) at 08:01

## 2019-01-01 RX ADMIN — ACETAMINOPHEN 650 MG: 325 TABLET ORAL at 22:38

## 2019-01-01 RX ADMIN — Medication 10 ML: at 09:18

## 2019-01-01 RX ADMIN — METOPROLOL TARTRATE 2.5 MG: 1 INJECTION, SOLUTION INTRAVENOUS at 02:24

## 2019-01-01 RX ADMIN — Medication 1 MG: at 20:22

## 2019-01-01 RX ADMIN — LEVOTHYROXINE SODIUM 75 MCG: 75 TABLET ORAL at 10:45

## 2019-01-01 RX ADMIN — POTASSIUM CHLORIDE 10 MEQ: 7.46 INJECTION, SOLUTION INTRAVENOUS at 13:04

## 2019-01-01 RX ADMIN — IPRATROPIUM BROMIDE AND ALBUTEROL SULFATE 1 AMPULE: .5; 3 SOLUTION RESPIRATORY (INHALATION) at 21:46

## 2019-01-01 RX ADMIN — MUPIROCIN: 20 OINTMENT TOPICAL at 20:58

## 2019-01-01 RX ADMIN — ATORVASTATIN CALCIUM 10 MG: 10 TABLET, FILM COATED ORAL at 09:21

## 2019-01-01 RX ADMIN — IPRATROPIUM BROMIDE AND ALBUTEROL SULFATE 3 ML: .5; 3 SOLUTION RESPIRATORY (INHALATION) at 12:58

## 2019-01-01 RX ADMIN — FORMOTEROL FUMARATE DIHYDRATE 20 MCG: 20 SOLUTION RESPIRATORY (INHALATION) at 09:02

## 2019-01-01 RX ADMIN — POTASSIUM PHOSPHATE, MONOBASIC AND POTASSIUM PHOSPHATE, DIBASIC 20 MMOL: 224; 236 INJECTION, SOLUTION, CONCENTRATE INTRAVENOUS at 10:15

## 2019-01-01 RX ADMIN — CHLORHEXIDINE GLUCONATE 0.12% ORAL RINSE 15 ML: 1.2 LIQUID ORAL at 22:42

## 2019-01-01 RX ADMIN — NYSTATIN: 100000 OINTMENT TOPICAL at 21:57

## 2019-01-01 RX ADMIN — WARFARIN SODIUM 2.5 MG: 5 TABLET ORAL at 18:00

## 2019-01-01 RX ADMIN — PROPOFOL 10 MCG/KG/MIN: 10 INJECTION, EMULSION INTRAVENOUS at 10:47

## 2019-01-01 RX ADMIN — DOCUSATE SODIUM 100 MG: 50 LIQUID ORAL at 09:25

## 2019-01-01 RX ADMIN — POTASSIUM CHLORIDE 10 MEQ: 7.46 INJECTION, SOLUTION INTRAVENOUS at 14:28

## 2019-01-01 RX ADMIN — PANTOPRAZOLE SODIUM 40 MG: 40 INJECTION, POWDER, FOR SOLUTION INTRAVENOUS at 08:13

## 2019-01-01 RX ADMIN — FORMOTEROL FUMARATE DIHYDRATE 20 MCG: 20 SOLUTION RESPIRATORY (INHALATION) at 19:53

## 2019-01-01 RX ADMIN — PROPOFOL 10 MCG/KG/MIN: 10 INJECTION, EMULSION INTRAVENOUS at 21:14

## 2019-01-01 RX ADMIN — IPRATROPIUM BROMIDE AND ALBUTEROL SULFATE 3 ML: .5; 3 SOLUTION RESPIRATORY (INHALATION) at 11:30

## 2019-01-01 RX ADMIN — BUDESONIDE 1000 MCG: 0.5 SUSPENSION RESPIRATORY (INHALATION) at 10:47

## 2019-01-01 RX ADMIN — IPRATROPIUM BROMIDE AND ALBUTEROL SULFATE 3 ML: .5; 3 SOLUTION RESPIRATORY (INHALATION) at 09:04

## 2019-01-01 RX ADMIN — ATORVASTATIN CALCIUM 10 MG: 10 TABLET, FILM COATED ORAL at 22:39

## 2019-01-01 RX ADMIN — BUDESONIDE 1000 MCG: 0.5 SUSPENSION RESPIRATORY (INHALATION) at 20:31

## 2019-01-01 RX ADMIN — CHLORHEXIDINE GLUCONATE 0.12% ORAL RINSE 15 ML: 1.2 LIQUID ORAL at 19:58

## 2019-01-01 RX ADMIN — WARFARIN SODIUM 3 MG: 3 TABLET ORAL at 02:04

## 2019-01-01 RX ADMIN — METOPROLOL TARTRATE 25 MG: 25 TABLET ORAL at 07:42

## 2019-01-01 RX ADMIN — FENTANYL CITRATE 50 MCG: 50 INJECTION, SOLUTION INTRAMUSCULAR; INTRAVENOUS at 14:26

## 2019-01-01 RX ADMIN — CEFEPIME HYDROCHLORIDE 2 G: 2 INJECTION, POWDER, FOR SOLUTION INTRAVENOUS at 13:32

## 2019-01-01 RX ADMIN — FORMOTEROL FUMARATE DIHYDRATE 20 MCG: 20 SOLUTION RESPIRATORY (INHALATION) at 21:30

## 2019-01-01 RX ADMIN — IPRATROPIUM BROMIDE AND ALBUTEROL SULFATE 3 ML: .5; 3 SOLUTION RESPIRATORY (INHALATION) at 17:41

## 2019-01-01 RX ADMIN — METOPROLOL TARTRATE 25 MG: 25 TABLET ORAL at 08:40

## 2019-01-01 RX ADMIN — METHYLPREDNISOLONE SODIUM SUCCINATE 40 MG: 40 INJECTION, POWDER, FOR SOLUTION INTRAMUSCULAR; INTRAVENOUS at 22:07

## 2019-01-01 RX ADMIN — BUDESONIDE 1000 MCG: 0.5 SUSPENSION RESPIRATORY (INHALATION) at 21:31

## 2019-01-01 RX ADMIN — IPRATROPIUM BROMIDE AND ALBUTEROL SULFATE 3 ML: .5; 3 SOLUTION RESPIRATORY (INHALATION) at 21:52

## 2019-01-01 RX ADMIN — IPRATROPIUM BROMIDE AND ALBUTEROL SULFATE 3 ML: .5; 3 SOLUTION RESPIRATORY (INHALATION) at 17:20

## 2019-01-01 RX ADMIN — BUDESONIDE 1000 MCG: 0.5 SUSPENSION RESPIRATORY (INHALATION) at 22:13

## 2019-01-01 RX ADMIN — METHYLPREDNISOLONE SODIUM SUCCINATE 40 MG: 40 INJECTION, POWDER, FOR SOLUTION INTRAMUSCULAR; INTRAVENOUS at 06:38

## 2019-01-01 RX ADMIN — LEVOTHYROXINE SODIUM 75 MCG: 75 TABLET ORAL at 08:28

## 2019-01-01 RX ADMIN — Medication 10 ML: at 20:58

## 2019-01-01 RX ADMIN — PANTOPRAZOLE SODIUM 40 MG: 40 INJECTION, POWDER, FOR SOLUTION INTRAVENOUS at 07:31

## 2019-01-01 RX ADMIN — FORMOTEROL FUMARATE DIHYDRATE 20 MCG: 20 SOLUTION RESPIRATORY (INHALATION) at 08:01

## 2019-01-01 RX ADMIN — SODIUM CHLORIDE, PRESERVATIVE FREE 300 UNITS: 5 INJECTION INTRAVENOUS at 09:23

## 2019-01-01 RX ADMIN — NYSTATIN: 100000 OINTMENT TOPICAL at 21:49

## 2019-01-01 RX ADMIN — BUDESONIDE 1000 MCG: 0.5 SUSPENSION RESPIRATORY (INHALATION) at 09:02

## 2019-01-01 RX ADMIN — BUDESONIDE 1000 MCG: 0.5 SUSPENSION RESPIRATORY (INHALATION) at 08:18

## 2019-01-01 RX ADMIN — LEVETIRACETAM 500 MG: 500 TABLET ORAL at 10:07

## 2019-01-01 RX ADMIN — BUDESONIDE 1000 MCG: 0.5 SUSPENSION RESPIRATORY (INHALATION) at 09:06

## 2019-01-01 RX ADMIN — BUDESONIDE 500 MCG: 0.5 INHALANT RESPIRATORY (INHALATION) at 07:44

## 2019-01-01 RX ADMIN — DOCUSATE SODIUM 100 MG: 50 LIQUID ORAL at 09:16

## 2019-01-01 RX ADMIN — FORMOTEROL FUMARATE DIHYDRATE 20 MCG: 20 SOLUTION RESPIRATORY (INHALATION) at 09:25

## 2019-01-01 RX ADMIN — IPRATROPIUM BROMIDE AND ALBUTEROL SULFATE 3 ML: .5; 3 SOLUTION RESPIRATORY (INHALATION) at 10:12

## 2019-01-01 RX ADMIN — CHLORHEXIDINE GLUCONATE 0.12% ORAL RINSE 15 ML: 1.2 LIQUID ORAL at 08:40

## 2019-01-01 RX ADMIN — IPRATROPIUM BROMIDE AND ALBUTEROL SULFATE 3 ML: .5; 3 SOLUTION RESPIRATORY (INHALATION) at 09:23

## 2019-01-01 RX ADMIN — BUDESONIDE 1000 MCG: 0.5 SUSPENSION RESPIRATORY (INHALATION) at 08:49

## 2019-01-01 RX ADMIN — FENTANYL CITRATE 75 MCG: 50 INJECTION, SOLUTION INTRAMUSCULAR; INTRAVENOUS at 04:03

## 2019-01-01 RX ADMIN — ASPIRIN 81 MG: 81 TABLET, COATED ORAL at 09:07

## 2019-01-01 RX ADMIN — ATORVASTATIN CALCIUM 10 MG: 10 TABLET, FILM COATED ORAL at 07:31

## 2019-01-01 RX ADMIN — ASPIRIN 81 MG 324 MG: 81 TABLET ORAL at 20:46

## 2019-01-01 RX ADMIN — IPRATROPIUM BROMIDE AND ALBUTEROL SULFATE 3 ML: .5; 3 SOLUTION RESPIRATORY (INHALATION) at 16:37

## 2019-01-01 RX ADMIN — PROPOFOL 30 MG: 10 INJECTION, EMULSION INTRAVENOUS at 15:19

## 2019-01-01 RX ADMIN — VANCOMYCIN HYDROCHLORIDE 500 MG: 500 INJECTION, POWDER, LYOPHILIZED, FOR SOLUTION INTRAVENOUS at 05:54

## 2019-01-01 RX ADMIN — IPRATROPIUM BROMIDE AND ALBUTEROL SULFATE 3 ML: .5; 3 SOLUTION RESPIRATORY (INHALATION) at 09:03

## 2019-01-01 RX ADMIN — IPRATROPIUM BROMIDE AND ALBUTEROL SULFATE 1 AMPULE: .5; 3 SOLUTION RESPIRATORY (INHALATION) at 11:42

## 2019-01-01 RX ADMIN — CEFEPIME HYDROCHLORIDE 2 G: 2 INJECTION, POWDER, FOR SOLUTION INTRAVENOUS at 14:19

## 2019-01-01 RX ADMIN — IPRATROPIUM BROMIDE AND ALBUTEROL SULFATE 3 ML: .5; 3 SOLUTION RESPIRATORY (INHALATION) at 17:11

## 2019-01-01 RX ADMIN — IPRATROPIUM BROMIDE AND ALBUTEROL SULFATE 1 AMPULE: .5; 3 SOLUTION RESPIRATORY (INHALATION) at 05:14

## 2019-01-01 RX ADMIN — FORMOTEROL FUMARATE DIHYDRATE 20 MCG: 20 SOLUTION RESPIRATORY (INHALATION) at 19:40

## 2019-01-01 RX ADMIN — PREDNISONE 40 MG: 20 TABLET ORAL at 11:41

## 2019-01-01 RX ADMIN — IPRATROPIUM BROMIDE AND ALBUTEROL SULFATE 3 ML: .5; 3 SOLUTION RESPIRATORY (INHALATION) at 20:31

## 2019-01-01 RX ADMIN — IPRATROPIUM BROMIDE AND ALBUTEROL SULFATE 3 ML: .5; 3 SOLUTION RESPIRATORY (INHALATION) at 12:20

## 2019-01-01 RX ADMIN — CEFEPIME HYDROCHLORIDE 2 G: 2 INJECTION, POWDER, FOR SOLUTION INTRAVENOUS at 22:07

## 2019-01-01 RX ADMIN — LEVOTHYROXINE SODIUM 75 MCG: 75 TABLET ORAL at 05:02

## 2019-01-01 RX ADMIN — SODIUM PHOSPHATE, MONOBASIC, MONOHYDRATE 10 MMOL: 276; 142 INJECTION, SOLUTION INTRAVENOUS at 09:13

## 2019-01-01 RX ADMIN — FORMOTEROL FUMARATE DIHYDRATE 20 MCG: 20 SOLUTION RESPIRATORY (INHALATION) at 20:36

## 2019-01-01 RX ADMIN — LINEZOLID 600 MG: 600 INJECTION, SOLUTION INTRAVENOUS at 22:17

## 2019-01-01 RX ADMIN — MUPIROCIN: 20 OINTMENT TOPICAL at 08:41

## 2019-01-01 RX ADMIN — DOCUSATE SODIUM 100 MG: 100 CAPSULE, LIQUID FILLED ORAL at 08:49

## 2019-01-01 RX ADMIN — IPRATROPIUM BROMIDE AND ALBUTEROL SULFATE 3 ML: .5; 3 SOLUTION RESPIRATORY (INHALATION) at 08:49

## 2019-01-01 RX ADMIN — METOPROLOL TARTRATE 25 MG: 25 TABLET ORAL at 21:00

## 2019-01-01 RX ADMIN — FORMOTEROL FUMARATE DIHYDRATE 20 MCG: 20 SOLUTION RESPIRATORY (INHALATION) at 20:49

## 2019-01-01 RX ADMIN — BUDESONIDE 1000 MCG: 0.5 SUSPENSION RESPIRATORY (INHALATION) at 21:30

## 2019-01-01 RX ADMIN — IPRATROPIUM BROMIDE AND ALBUTEROL SULFATE 3 ML: .5; 3 SOLUTION RESPIRATORY (INHALATION) at 09:01

## 2019-01-01 RX ADMIN — FORMOTEROL FUMARATE DIHYDRATE 20 MCG: 20 SOLUTION RESPIRATORY (INHALATION) at 22:10

## 2019-01-01 RX ADMIN — METHYLPREDNISOLONE SODIUM SUCCINATE 40 MG: 40 INJECTION, POWDER, FOR SOLUTION INTRAMUSCULAR; INTRAVENOUS at 23:04

## 2019-01-01 RX ADMIN — CEFEPIME HYDROCHLORIDE 2 G: 2 INJECTION, POWDER, FOR SOLUTION INTRAVENOUS at 22:13

## 2019-01-01 RX ADMIN — FENTANYL CITRATE 75 MCG: 50 INJECTION, SOLUTION INTRAMUSCULAR; INTRAVENOUS at 04:33

## 2019-01-01 RX ADMIN — IPRATROPIUM BROMIDE AND ALBUTEROL SULFATE 3 ML: .5; 3 SOLUTION RESPIRATORY (INHALATION) at 19:49

## 2019-01-01 RX ADMIN — IPRATROPIUM BROMIDE AND ALBUTEROL SULFATE 1 AMPULE: .5; 3 SOLUTION RESPIRATORY (INHALATION) at 12:17

## 2019-01-01 RX ADMIN — PROPOFOL 25 MCG/KG/MIN: 10 INJECTION, EMULSION INTRAVENOUS at 01:01

## 2019-01-01 RX ADMIN — LEVOTHYROXINE SODIUM 75 MCG: 75 TABLET ORAL at 08:23

## 2019-01-01 RX ADMIN — Medication 10 ML: at 06:56

## 2019-01-01 RX ADMIN — HYDRALAZINE HYDROCHLORIDE 10 MG: 20 INJECTION INTRAMUSCULAR; INTRAVENOUS at 02:56

## 2019-01-01 RX ADMIN — IPRATROPIUM BROMIDE AND ALBUTEROL SULFATE 3 ML: .5; 3 SOLUTION RESPIRATORY (INHALATION) at 16:41

## 2019-01-01 RX ADMIN — MUPIROCIN: 20 OINTMENT TOPICAL at 20:35

## 2019-01-01 RX ADMIN — IPRATROPIUM BROMIDE AND ALBUTEROL SULFATE 3 ML: .5; 3 SOLUTION RESPIRATORY (INHALATION) at 20:05

## 2019-01-01 RX ADMIN — Medication 10 ML: at 04:04

## 2019-01-01 RX ADMIN — BUDESONIDE 1000 MCG: 0.5 SUSPENSION RESPIRATORY (INHALATION) at 19:40

## 2019-01-01 RX ADMIN — METOPROLOL TARTRATE 25 MG: 25 TABLET ORAL at 21:50

## 2019-01-01 RX ADMIN — CEFEPIME HYDROCHLORIDE 2 G: 2 INJECTION, POWDER, FOR SOLUTION INTRAVENOUS at 05:54

## 2019-01-01 RX ADMIN — CEFEPIME HYDROCHLORIDE 2 G: 2 INJECTION, POWDER, FOR SOLUTION INTRAVENOUS at 05:56

## 2019-01-01 RX ADMIN — LEVOTHYROXINE SODIUM 75 MCG: 75 TABLET ORAL at 09:08

## 2019-01-01 RX ADMIN — CHLORHEXIDINE GLUCONATE 0.12% ORAL RINSE 15 ML: 1.2 LIQUID ORAL at 07:32

## 2019-01-01 RX ADMIN — Medication 10 ML: at 10:46

## 2019-01-01 RX ADMIN — METOPROLOL TARTRATE 25 MG: 25 TABLET ORAL at 09:07

## 2019-01-01 RX ADMIN — ATORVASTATIN CALCIUM 10 MG: 10 TABLET, FILM COATED ORAL at 20:04

## 2019-01-01 RX ADMIN — ACETAMINOPHEN 500 MG: 500 TABLET ORAL at 06:31

## 2019-01-01 RX ADMIN — FORMOTEROL FUMARATE DIHYDRATE 20 MCG: 20 SOLUTION RESPIRATORY (INHALATION) at 08:49

## 2019-01-01 RX ADMIN — SODIUM CHLORIDE: 9 INJECTION, SOLUTION INTRAVENOUS at 05:30

## 2019-01-01 RX ADMIN — ASPIRIN 81 MG: 81 TABLET, COATED ORAL at 08:49

## 2019-01-01 RX ADMIN — Medication 10 ML: at 20:34

## 2019-01-01 RX ADMIN — FORMOTEROL FUMARATE DIHYDRATE 20 MCG: 20 SOLUTION RESPIRATORY (INHALATION) at 09:23

## 2019-01-01 RX ADMIN — METHYLPREDNISOLONE SODIUM SUCCINATE 40 MG: 40 INJECTION, POWDER, FOR SOLUTION INTRAMUSCULAR; INTRAVENOUS at 17:15

## 2019-01-01 RX ADMIN — SODIUM CHLORIDE 10 ML: 9 INJECTION, SOLUTION INTRAMUSCULAR; INTRAVENOUS; SUBCUTANEOUS at 09:10

## 2019-01-01 RX ADMIN — METHYLPREDNISOLONE SODIUM SUCCINATE 40 MG: 40 INJECTION, POWDER, FOR SOLUTION INTRAMUSCULAR; INTRAVENOUS at 15:03

## 2019-01-01 RX ADMIN — MUPIROCIN: 20 OINTMENT TOPICAL at 08:57

## 2019-01-01 RX ADMIN — PROPOFOL 50 MG: 10 INJECTION, EMULSION INTRAVENOUS at 15:16

## 2019-01-01 RX ADMIN — IPRATROPIUM BROMIDE AND ALBUTEROL SULFATE 3 ML: .5; 3 SOLUTION RESPIRATORY (INHALATION) at 21:30

## 2019-01-01 RX ADMIN — POTASSIUM PHOSPHATE, MONOBASIC AND POTASSIUM PHOSPHATE, DIBASIC 15 MMOL: 224; 236 INJECTION, SOLUTION, CONCENTRATE INTRAVENOUS at 10:18

## 2019-01-01 RX ADMIN — ATORVASTATIN CALCIUM 10 MG: 10 TABLET, FILM COATED ORAL at 10:38

## 2019-01-01 RX ADMIN — METHYLPREDNISOLONE SODIUM SUCCINATE 40 MG: 40 INJECTION, POWDER, FOR SOLUTION INTRAMUSCULAR; INTRAVENOUS at 21:59

## 2019-01-01 RX ADMIN — FORMOTEROL FUMARATE DIHYDRATE 20 MCG: 20 SOLUTION RESPIRATORY (INHALATION) at 20:46

## 2019-01-01 RX ADMIN — ATORVASTATIN CALCIUM 10 MG: 10 TABLET, FILM COATED ORAL at 09:49

## 2019-01-01 RX ADMIN — WARFARIN SODIUM 2.5 MG: 5 TABLET ORAL at 18:17

## 2019-01-01 RX ADMIN — METHYLPREDNISOLONE SODIUM SUCCINATE 40 MG: 40 INJECTION, POWDER, FOR SOLUTION INTRAMUSCULAR; INTRAVENOUS at 06:18

## 2019-01-01 RX ADMIN — BUDESONIDE 500 MCG: 0.5 INHALANT RESPIRATORY (INHALATION) at 21:45

## 2019-01-01 RX ADMIN — CEFEPIME HYDROCHLORIDE 2 G: 2 INJECTION, POWDER, FOR SOLUTION INTRAVENOUS at 23:03

## 2019-01-01 RX ADMIN — LINEZOLID 600 MG: 600 INJECTION, SOLUTION INTRAVENOUS at 11:36

## 2019-01-01 RX ADMIN — FORMOTEROL FUMARATE DIHYDRATE 20 MCG: 20 SOLUTION RESPIRATORY (INHALATION) at 09:03

## 2019-01-01 RX ADMIN — IPRATROPIUM BROMIDE AND ALBUTEROL SULFATE 3 ML: .5; 3 SOLUTION RESPIRATORY (INHALATION) at 09:58

## 2019-01-01 RX ADMIN — FORMOTEROL FUMARATE DIHYDRATE 20 MCG: 20 SOLUTION RESPIRATORY (INHALATION) at 09:00

## 2019-01-01 RX ADMIN — Medication 10 ML: at 09:24

## 2019-01-01 RX ADMIN — FORMOTEROL FUMARATE DIHYDRATE 20 MCG: 20 SOLUTION RESPIRATORY (INHALATION) at 05:14

## 2019-01-01 RX ADMIN — NYSTATIN: 100000 OINTMENT TOPICAL at 09:01

## 2019-01-01 RX ADMIN — SODIUM CHLORIDE 250 ML: 9 INJECTION, SOLUTION INTRAVENOUS at 13:46

## 2019-01-01 RX ADMIN — ACETAMINOPHEN 500 MG: 500 TABLET ORAL at 01:04

## 2019-01-01 RX ADMIN — CHLORHEXIDINE GLUCONATE 0.12% ORAL RINSE 15 ML: 1.2 LIQUID ORAL at 08:13

## 2019-01-01 RX ADMIN — Medication 25 MCG/HR: at 04:41

## 2019-01-01 RX ADMIN — IPRATROPIUM BROMIDE AND ALBUTEROL SULFATE 3 ML: .5; 3 SOLUTION RESPIRATORY (INHALATION) at 09:16

## 2019-01-01 RX ADMIN — METOPROLOL TARTRATE 25 MG: 25 TABLET ORAL at 19:58

## 2019-01-01 RX ADMIN — PREDNISONE 40 MG: 20 TABLET ORAL at 10:38

## 2019-01-01 RX ADMIN — ATORVASTATIN CALCIUM 10 MG: 10 TABLET, FILM COATED ORAL at 08:42

## 2019-01-01 RX ADMIN — NYSTATIN: 100000 OINTMENT TOPICAL at 20:06

## 2019-01-01 RX ADMIN — METOPROLOL TARTRATE 2.5 MG: 1 INJECTION, SOLUTION INTRAVENOUS at 08:50

## 2019-01-01 RX ADMIN — FORMOTEROL FUMARATE DIHYDRATE 20 MCG: 20 SOLUTION RESPIRATORY (INHALATION) at 20:43

## 2019-01-01 RX ADMIN — CEFEPIME HYDROCHLORIDE 2 G: 2 INJECTION, POWDER, FOR SOLUTION INTRAVENOUS at 13:31

## 2019-01-01 RX ADMIN — METOPROLOL TARTRATE 25 MG: 25 TABLET ORAL at 21:58

## 2019-01-01 RX ADMIN — ENOXAPARIN SODIUM 30 MG: 30 INJECTION SUBCUTANEOUS at 08:24

## 2019-01-01 RX ADMIN — BUDESONIDE 1000 MCG: 0.5 SUSPENSION RESPIRATORY (INHALATION) at 21:52

## 2019-01-01 RX ADMIN — FORMOTEROL FUMARATE DIHYDRATE 20 MCG: 20 SOLUTION RESPIRATORY (INHALATION) at 10:12

## 2019-01-01 RX ADMIN — LEVOTHYROXINE SODIUM 75 MCG: 75 TABLET ORAL at 07:25

## 2019-01-01 RX ADMIN — METOPROLOL TARTRATE 25 MG: 25 TABLET ORAL at 20:13

## 2019-01-01 RX ADMIN — NYSTATIN: 100000 OINTMENT TOPICAL at 10:13

## 2019-01-01 RX ADMIN — IPRATROPIUM BROMIDE AND ALBUTEROL SULFATE 3 ML: .5; 3 SOLUTION RESPIRATORY (INHALATION) at 21:31

## 2019-01-01 RX ADMIN — PREDNISONE 40 MG: 20 TABLET ORAL at 11:42

## 2019-01-01 RX ADMIN — QUETIAPINE FUMARATE 12.5 MG: 25 TABLET ORAL at 00:52

## 2019-01-01 RX ADMIN — Medication 10 ML: at 20:06

## 2019-01-01 RX ADMIN — METHYLPREDNISOLONE SODIUM SUCCINATE 40 MG: 40 INJECTION, POWDER, FOR SOLUTION INTRAMUSCULAR; INTRAVENOUS at 06:49

## 2019-01-01 RX ADMIN — METHYLPREDNISOLONE SODIUM SUCCINATE 40 MG: 40 INJECTION, POWDER, FOR SOLUTION INTRAMUSCULAR; INTRAVENOUS at 14:16

## 2019-01-01 RX ADMIN — LEVOFLOXACIN 750 MG: 750 TABLET, FILM COATED ORAL at 18:29

## 2019-01-01 RX ADMIN — PANTOPRAZOLE SODIUM 40 MG: 40 INJECTION, POWDER, FOR SOLUTION INTRAVENOUS at 08:58

## 2019-01-01 RX ADMIN — IPRATROPIUM BROMIDE AND ALBUTEROL SULFATE 3 ML: .5; 3 SOLUTION RESPIRATORY (INHALATION) at 15:59

## 2019-01-01 RX ADMIN — METHYLPREDNISOLONE SODIUM SUCCINATE 40 MG: 40 INJECTION, POWDER, FOR SOLUTION INTRAMUSCULAR; INTRAVENOUS at 05:16

## 2019-01-01 RX ADMIN — IPRATROPIUM BROMIDE AND ALBUTEROL SULFATE 3 ML: .5; 3 SOLUTION RESPIRATORY (INHALATION) at 20:46

## 2019-01-01 RX ADMIN — Medication 10 ML: at 21:33

## 2019-01-01 RX ADMIN — Medication 10 ML: at 20:07

## 2019-01-01 RX ADMIN — PANTOPRAZOLE SODIUM 40 MG: 40 INJECTION, POWDER, FOR SOLUTION INTRAVENOUS at 08:50

## 2019-01-01 RX ADMIN — ATORVASTATIN CALCIUM 10 MG: 10 TABLET, FILM COATED ORAL at 08:56

## 2019-01-01 RX ADMIN — SODIUM CHLORIDE 500 ML: 9 INJECTION, SOLUTION INTRAVENOUS at 20:48

## 2019-01-01 RX ADMIN — SODIUM CHLORIDE 500 ML: 9 INJECTION, SOLUTION INTRAVENOUS at 04:15

## 2019-01-01 RX ADMIN — PROPOFOL 20 MCG/KG/MIN: 10 INJECTION, EMULSION INTRAVENOUS at 13:11

## 2019-01-01 RX ADMIN — METHYLPREDNISOLONE SODIUM SUCCINATE 40 MG: 40 INJECTION, POWDER, FOR SOLUTION INTRAMUSCULAR; INTRAVENOUS at 22:15

## 2019-01-01 RX ADMIN — METOPROLOL TARTRATE 2.5 MG: 1 INJECTION, SOLUTION INTRAVENOUS at 21:49

## 2019-01-01 RX ADMIN — Medication 50 MCG/HR: at 01:45

## 2019-01-01 RX ADMIN — FORMOTEROL FUMARATE DIHYDRATE 20 MCG: 20 SOLUTION RESPIRATORY (INHALATION) at 09:53

## 2019-01-01 RX ADMIN — IPRATROPIUM BROMIDE AND ALBUTEROL SULFATE 3 ML: .5; 3 SOLUTION RESPIRATORY (INHALATION) at 17:51

## 2019-01-01 RX ADMIN — ATORVASTATIN CALCIUM 10 MG: 10 TABLET, FILM COATED ORAL at 08:49

## 2019-01-01 RX ADMIN — LEVOTHYROXINE SODIUM 75 MCG: 75 TABLET ORAL at 08:00

## 2019-01-01 RX ADMIN — FORMOTEROL FUMARATE DIHYDRATE 20 MCG: 20 SOLUTION RESPIRATORY (INHALATION) at 09:04

## 2019-01-01 RX ADMIN — WARFARIN SODIUM 2.5 MG: 5 TABLET ORAL at 18:13

## 2019-01-01 RX ADMIN — FORMOTEROL FUMARATE DIHYDRATE 20 MCG: 20 SOLUTION RESPIRATORY (INHALATION) at 10:47

## 2019-01-01 RX ADMIN — WARFARIN SODIUM 2.5 MG: 2.5 TABLET ORAL at 18:05

## 2019-01-01 RX ADMIN — LEVOTHYROXINE SODIUM 75 MCG: 75 TABLET ORAL at 06:08

## 2019-01-01 RX ADMIN — NYSTATIN: 100000 OINTMENT TOPICAL at 09:23

## 2019-01-01 RX ADMIN — VANCOMYCIN HYDROCHLORIDE 1000 MG: 1 INJECTION, POWDER, LYOPHILIZED, FOR SOLUTION INTRAVENOUS at 01:51

## 2019-01-01 RX ADMIN — Medication 10 ML: at 09:20

## 2019-01-01 RX ADMIN — METHYLPREDNISOLONE SODIUM SUCCINATE 40 MG: 40 INJECTION, POWDER, FOR SOLUTION INTRAMUSCULAR; INTRAVENOUS at 05:45

## 2019-01-01 RX ADMIN — METOPROLOL TARTRATE 50 MG: 50 TABLET, FILM COATED ORAL at 10:45

## 2019-01-01 RX ADMIN — FORMOTEROL FUMARATE DIHYDRATE 20 MCG: 20 SOLUTION RESPIRATORY (INHALATION) at 08:18

## 2019-01-01 RX ADMIN — CEFEPIME HYDROCHLORIDE 2 G: 2 INJECTION, POWDER, FOR SOLUTION INTRAVENOUS at 13:54

## 2019-01-01 RX ADMIN — NITROGLYCERIN 0.5 INCH: 20 OINTMENT TOPICAL at 22:03

## 2019-01-01 RX ADMIN — METOPROLOL TARTRATE 25 MG: 25 TABLET ORAL at 09:16

## 2019-01-01 RX ADMIN — FORMOTEROL FUMARATE DIHYDRATE 20 MCG: 20 SOLUTION RESPIRATORY (INHALATION) at 20:05

## 2019-01-01 RX ADMIN — BUDESONIDE 1000 MCG: 0.5 SUSPENSION RESPIRATORY (INHALATION) at 21:59

## 2019-01-01 RX ADMIN — ATORVASTATIN CALCIUM 10 MG: 10 TABLET, FILM COATED ORAL at 09:15

## 2019-01-01 RX ADMIN — ATORVASTATIN CALCIUM 10 MG: 10 TABLET, FILM COATED ORAL at 08:13

## 2019-01-01 RX ADMIN — CEFEPIME HYDROCHLORIDE 2 G: 2 INJECTION, POWDER, FOR SOLUTION INTRAVENOUS at 22:30

## 2019-01-01 RX ADMIN — Medication 10 ML: at 02:05

## 2019-01-01 RX ADMIN — METHYLPREDNISOLONE SODIUM SUCCINATE 40 MG: 40 INJECTION, POWDER, FOR SOLUTION INTRAMUSCULAR; INTRAVENOUS at 06:19

## 2019-01-01 RX ADMIN — VANCOMYCIN HYDROCHLORIDE 500 MG: 500 INJECTION, POWDER, LYOPHILIZED, FOR SOLUTION INTRAVENOUS at 05:57

## 2019-01-01 RX ADMIN — ACETAMINOPHEN 500 MG: 500 TABLET ORAL at 21:57

## 2019-01-01 RX ADMIN — BUDESONIDE 500 MCG: 0.5 INHALANT RESPIRATORY (INHALATION) at 22:09

## 2019-01-01 RX ADMIN — BUDESONIDE 1000 MCG: 0.5 SUSPENSION RESPIRATORY (INHALATION) at 20:47

## 2019-01-01 RX ADMIN — PANTOPRAZOLE SODIUM 40 MG: 40 INJECTION, POWDER, FOR SOLUTION INTRAVENOUS at 09:22

## 2019-01-01 RX ADMIN — CHLORHEXIDINE GLUCONATE 0.12% ORAL RINSE 15 ML: 1.2 LIQUID ORAL at 21:00

## 2019-01-01 RX ADMIN — IPRATROPIUM BROMIDE AND ALBUTEROL SULFATE 3 ML: .5; 3 SOLUTION RESPIRATORY (INHALATION) at 17:02

## 2019-01-01 RX ADMIN — IPRATROPIUM BROMIDE AND ALBUTEROL SULFATE 3 ML: .5; 3 SOLUTION RESPIRATORY (INHALATION) at 11:52

## 2019-01-01 RX ADMIN — METOPROLOL TARTRATE 25 MG: 25 TABLET ORAL at 10:42

## 2019-01-01 RX ADMIN — PHYTONADIONE 10 MG: 10 INJECTION, EMULSION INTRAMUSCULAR; INTRAVENOUS; SUBCUTANEOUS at 17:54

## 2019-01-01 RX ADMIN — NYSTATIN: 100000 OINTMENT TOPICAL at 21:20

## 2019-01-01 RX ADMIN — PANTOPRAZOLE SODIUM 40 MG: 40 INJECTION, POWDER, FOR SOLUTION INTRAVENOUS at 08:41

## 2019-01-01 RX ADMIN — LEVOTHYROXINE SODIUM 75 MCG: 75 TABLET ORAL at 08:20

## 2019-01-01 RX ADMIN — LEVOTHYROXINE SODIUM 75 MCG: 75 TABLET ORAL at 08:49

## 2019-01-01 RX ADMIN — LEVETIRACETAM 500 MG: 500 TABLET ORAL at 08:24

## 2019-01-01 RX ADMIN — CEFEPIME HYDROCHLORIDE 2 G: 2 INJECTION, POWDER, FOR SOLUTION INTRAVENOUS at 14:20

## 2019-01-01 RX ADMIN — Medication 50 MCG/HR: at 10:19

## 2019-01-01 RX ADMIN — IPRATROPIUM BROMIDE AND ALBUTEROL SULFATE 1 AMPULE: .5; 3 SOLUTION RESPIRATORY (INHALATION) at 02:38

## 2019-01-01 RX ADMIN — Medication 10 ML: at 05:53

## 2019-01-01 RX ADMIN — IPRATROPIUM BROMIDE AND ALBUTEROL SULFATE 1 AMPULE: .5; 3 SOLUTION RESPIRATORY (INHALATION) at 16:09

## 2019-01-01 RX ADMIN — FORMOTEROL FUMARATE DIHYDRATE 20 MCG: 20 SOLUTION RESPIRATORY (INHALATION) at 21:45

## 2019-01-01 RX ADMIN — NYSTATIN: 100000 OINTMENT TOPICAL at 12:16

## 2019-01-01 RX ADMIN — POTASSIUM CHLORIDE 40 MEQ: 20 TABLET, EXTENDED RELEASE ORAL at 09:07

## 2019-01-01 RX ADMIN — SODIUM CHLORIDE, PRESERVATIVE FREE 300 UNITS: 5 INJECTION INTRAVENOUS at 21:50

## 2019-01-01 RX ADMIN — IPRATROPIUM BROMIDE AND ALBUTEROL SULFATE 3 ML: .5; 3 SOLUTION RESPIRATORY (INHALATION) at 15:50

## 2019-01-01 RX ADMIN — LORAZEPAM 1 MG: 2 INJECTION INTRAMUSCULAR; INTRAVENOUS at 09:49

## 2019-01-01 RX ADMIN — Medication 10 ML: at 20:13

## 2019-01-01 RX ADMIN — PREDNISONE 20 MG: 20 TABLET ORAL at 08:20

## 2019-01-01 RX ADMIN — ACETAMINOPHEN 500 MG: 500 TABLET ORAL at 07:31

## 2019-01-01 RX ADMIN — PROPOFOL 10 MCG/KG/MIN: 10 INJECTION, EMULSION INTRAVENOUS at 04:04

## 2019-01-01 RX ADMIN — DOCUSATE SODIUM 100 MG: 100 CAPSULE, LIQUID FILLED ORAL at 08:55

## 2019-01-01 RX ADMIN — METOPROLOL TARTRATE 25 MG: 25 TABLET ORAL at 09:17

## 2019-01-01 RX ADMIN — BUDESONIDE 1000 MCG: 0.5 SUSPENSION RESPIRATORY (INHALATION) at 19:49

## 2019-01-01 RX ADMIN — BUDESONIDE 1000 MCG: 0.5 SUSPENSION RESPIRATORY (INHALATION) at 20:05

## 2019-01-01 RX ADMIN — IPRATROPIUM BROMIDE AND ALBUTEROL SULFATE 3 ML: .5; 3 SOLUTION RESPIRATORY (INHALATION) at 09:10

## 2019-01-01 RX ADMIN — METOPROLOL TARTRATE 25 MG: 25 TABLET ORAL at 20:03

## 2019-01-01 RX ADMIN — CHLORHEXIDINE GLUCONATE 0.12% ORAL RINSE 15 ML: 1.2 LIQUID ORAL at 20:33

## 2019-01-01 RX ADMIN — IPRATROPIUM BROMIDE AND ALBUTEROL SULFATE 3 ML: .5; 3 SOLUTION RESPIRATORY (INHALATION) at 09:54

## 2019-01-01 RX ADMIN — CEFEPIME HYDROCHLORIDE 2 G: 2 INJECTION, POWDER, FOR SOLUTION INTRAVENOUS at 06:18

## 2019-01-01 RX ADMIN — Medication 10 ML: at 10:19

## 2019-01-01 RX ADMIN — Medication 10 ML: at 08:13

## 2019-01-01 RX ADMIN — DEXMEDETOMIDINE 1.4 MCG/KG/HR: 100 INJECTION, SOLUTION, CONCENTRATE INTRAVENOUS at 08:32

## 2019-01-01 RX ADMIN — MAGNESIUM SULFATE HEPTAHYDRATE 1 G: 1 INJECTION, SOLUTION INTRAVENOUS at 07:06

## 2019-01-01 RX ADMIN — IPRATROPIUM BROMIDE AND ALBUTEROL SULFATE 3 ML: .5; 3 SOLUTION RESPIRATORY (INHALATION) at 12:42

## 2019-01-01 RX ADMIN — CHLORHEXIDINE GLUCONATE 0.12% ORAL RINSE 15 ML: 1.2 LIQUID ORAL at 20:13

## 2019-01-01 RX ADMIN — FORMOTEROL FUMARATE DIHYDRATE 20 MCG: 20 SOLUTION RESPIRATORY (INHALATION) at 21:32

## 2019-01-01 RX ADMIN — NYSTATIN: 100000 OINTMENT TOPICAL at 20:54

## 2019-01-01 RX ADMIN — IPRATROPIUM BROMIDE AND ALBUTEROL SULFATE 3 ML: .5; 3 SOLUTION RESPIRATORY (INHALATION) at 06:30

## 2019-01-01 RX ADMIN — PROPOFOL 25.05 MCG/KG/MIN: 10 INJECTION, EMULSION INTRAVENOUS at 23:14

## 2019-01-01 RX ADMIN — IPRATROPIUM BROMIDE AND ALBUTEROL SULFATE 3 ML: .5; 3 SOLUTION RESPIRATORY (INHALATION) at 11:46

## 2019-01-01 RX ADMIN — BUDESONIDE 1000 MCG: 0.5 SUSPENSION RESPIRATORY (INHALATION) at 10:12

## 2019-01-01 RX ADMIN — ATORVASTATIN CALCIUM 10 MG: 10 TABLET, FILM COATED ORAL at 09:22

## 2019-01-01 RX ADMIN — VANCOMYCIN HYDROCHLORIDE 500 MG: 500 INJECTION, POWDER, LYOPHILIZED, FOR SOLUTION INTRAVENOUS at 18:13

## 2019-01-01 RX ADMIN — METHYLPREDNISOLONE SODIUM SUCCINATE 40 MG: 40 INJECTION, POWDER, FOR SOLUTION INTRAMUSCULAR; INTRAVENOUS at 00:00

## 2019-01-01 RX ADMIN — METOPROLOL TARTRATE 25 MG: 25 TABLET ORAL at 09:15

## 2019-01-01 RX ADMIN — LEVETIRACETAM 500 MG: 500 TABLET ORAL at 21:34

## 2019-01-01 RX ADMIN — IPRATROPIUM BROMIDE AND ALBUTEROL SULFATE 3 ML: .5; 3 SOLUTION RESPIRATORY (INHALATION) at 20:43

## 2019-01-01 RX ADMIN — CEFEPIME HYDROCHLORIDE 2 G: 2 INJECTION, POWDER, FOR SOLUTION INTRAVENOUS at 15:03

## 2019-01-01 RX ADMIN — IPRATROPIUM BROMIDE AND ALBUTEROL SULFATE 3 ML: .5; 3 SOLUTION RESPIRATORY (INHALATION) at 20:49

## 2019-01-01 RX ADMIN — VANCOMYCIN HYDROCHLORIDE 500 MG: 500 INJECTION, POWDER, LYOPHILIZED, FOR SOLUTION INTRAVENOUS at 18:29

## 2019-01-01 RX ADMIN — FORMOTEROL FUMARATE DIHYDRATE 20 MCG: 20 SOLUTION RESPIRATORY (INHALATION) at 06:30

## 2019-01-01 RX ADMIN — ATORVASTATIN CALCIUM 10 MG: 10 TABLET, FILM COATED ORAL at 09:16

## 2019-01-01 RX ADMIN — NYSTATIN: 100000 OINTMENT TOPICAL at 20:59

## 2019-01-01 RX ADMIN — PANTOPRAZOLE SODIUM 40 MG: 40 INJECTION, POWDER, FOR SOLUTION INTRAVENOUS at 21:55

## 2019-01-01 RX ADMIN — CHLORHEXIDINE GLUCONATE 0.12% ORAL RINSE 15 ML: 1.2 LIQUID ORAL at 21:11

## 2019-01-01 RX ADMIN — BUDESONIDE 1000 MCG: 0.5 SUSPENSION RESPIRATORY (INHALATION) at 09:25

## 2019-01-01 RX ADMIN — CEFEPIME HYDROCHLORIDE 2 G: 2 INJECTION, POWDER, FOR SOLUTION INTRAVENOUS at 14:36

## 2019-01-01 RX ADMIN — FORMOTEROL FUMARATE DIHYDRATE 20 MCG: 20 SOLUTION RESPIRATORY (INHALATION) at 19:59

## 2019-01-01 RX ADMIN — METHYLPREDNISOLONE SODIUM SUCCINATE 40 MG: 40 INJECTION, POWDER, FOR SOLUTION INTRAMUSCULAR; INTRAVENOUS at 05:47

## 2019-01-01 RX ADMIN — ENOXAPARIN SODIUM 30 MG: 30 INJECTION SUBCUTANEOUS at 10:45

## 2019-01-01 RX ADMIN — POTASSIUM PHOSPHATE, MONOBASIC AND POTASSIUM PHOSPHATE, DIBASIC 20 MMOL: 224; 236 INJECTION, SOLUTION, CONCENTRATE INTRAVENOUS at 10:07

## 2019-01-01 RX ADMIN — ATORVASTATIN CALCIUM 10 MG: 10 TABLET, FILM COATED ORAL at 09:07

## 2019-01-01 RX ADMIN — BUDESONIDE 1000 MCG: 0.5 SUSPENSION RESPIRATORY (INHALATION) at 09:23

## 2019-01-01 RX ADMIN — Medication 10 ML: at 16:52

## 2019-01-01 RX ADMIN — METOPROLOL TARTRATE 50 MG: 50 TABLET, FILM COATED ORAL at 21:32

## 2019-01-01 RX ADMIN — METHYLPREDNISOLONE SODIUM SUCCINATE 40 MG: 40 INJECTION, POWDER, FOR SOLUTION INTRAMUSCULAR; INTRAVENOUS at 15:59

## 2019-01-01 RX ADMIN — BARIUM SULFATE 45 G: 0.6 CREAM ORAL at 14:35

## 2019-01-01 RX ADMIN — BUDESONIDE 1000 MCG: 0.5 SUSPENSION RESPIRATORY (INHALATION) at 19:59

## 2019-01-01 RX ADMIN — BUDESONIDE 1000 MCG: 0.5 SUSPENSION RESPIRATORY (INHALATION) at 09:00

## 2019-01-01 RX ADMIN — METOPROLOL TARTRATE 50 MG: 50 TABLET, FILM COATED ORAL at 21:34

## 2019-01-01 RX ADMIN — CEFEPIME HYDROCHLORIDE 2 G: 2 INJECTION, POWDER, FOR SOLUTION INTRAVENOUS at 06:46

## 2019-01-01 RX ADMIN — IPRATROPIUM BROMIDE AND ALBUTEROL SULFATE 3 ML: .5; 3 SOLUTION RESPIRATORY (INHALATION) at 12:59

## 2019-01-01 RX ADMIN — Medication 10 ML: at 18:19

## 2019-01-01 RX ADMIN — Medication 10 ML: at 17:08

## 2019-01-01 RX ADMIN — WARFARIN SODIUM 2 MG: 2 TABLET ORAL at 17:56

## 2019-01-01 RX ADMIN — Medication 10 ML: at 10:07

## 2019-01-01 RX ADMIN — Medication 10 ML: at 09:17

## 2019-01-01 RX ADMIN — POTASSIUM CHLORIDE 20 MEQ: 29.8 INJECTION, SOLUTION INTRAVENOUS at 08:09

## 2019-01-01 RX ADMIN — METOPROLOL TARTRATE 50 MG: 50 TABLET, FILM COATED ORAL at 20:05

## 2019-01-01 RX ADMIN — DEXMEDETOMIDINE 0.8 MCG/KG/HR: 100 INJECTION, SOLUTION, CONCENTRATE INTRAVENOUS at 11:36

## 2019-01-01 RX ADMIN — ETOMIDATE 20 MG: 2 INJECTION INTRAVENOUS at 03:34

## 2019-01-01 RX ADMIN — FORMOTEROL FUMARATE DIHYDRATE 20 MCG: 20 SOLUTION RESPIRATORY (INHALATION) at 09:15

## 2019-01-01 RX ADMIN — Medication 10 ML: at 07:31

## 2019-01-01 RX ADMIN — MAGNESIUM SULFATE HEPTAHYDRATE 2 G: 40 INJECTION, SOLUTION INTRAVENOUS at 09:14

## 2019-01-01 RX ADMIN — NYSTATIN: 100000 OINTMENT TOPICAL at 05:50

## 2019-01-01 RX ADMIN — MAGNESIUM SULFATE HEPTAHYDRATE 1 G: 1 INJECTION, SOLUTION INTRAVENOUS at 08:55

## 2019-01-01 RX ADMIN — PANTOPRAZOLE SODIUM 40 MG: 40 INJECTION, POWDER, FOR SOLUTION INTRAVENOUS at 09:07

## 2019-01-01 RX ADMIN — IOPAMIDOL 60 ML: 755 INJECTION, SOLUTION INTRAVENOUS at 17:07

## 2019-01-01 RX ADMIN — LEVOTHYROXINE SODIUM 75 MCG: 75 TABLET ORAL at 06:24

## 2019-01-01 RX ADMIN — METHYLPREDNISOLONE SODIUM SUCCINATE 40 MG: 40 INJECTION, POWDER, FOR SOLUTION INTRAMUSCULAR; INTRAVENOUS at 22:38

## 2019-01-01 RX ADMIN — PANTOPRAZOLE SODIUM 40 MG: 40 INJECTION, POWDER, FOR SOLUTION INTRAVENOUS at 09:17

## 2019-01-01 RX ADMIN — PANTOPRAZOLE SODIUM 40 MG: 40 INJECTION, POWDER, FOR SOLUTION INTRAVENOUS at 09:15

## 2019-01-01 RX ADMIN — Medication 10 ML: at 22:40

## 2019-01-01 RX ADMIN — MUPIROCIN: 20 OINTMENT TOPICAL at 19:58

## 2019-01-01 RX ADMIN — IPRATROPIUM BROMIDE AND ALBUTEROL SULFATE 3 ML: .5; 3 SOLUTION RESPIRATORY (INHALATION) at 22:00

## 2019-01-01 RX ADMIN — METOPROLOL TARTRATE 25 MG: 25 TABLET ORAL at 21:12

## 2019-01-01 RX ADMIN — Medication 125 MCG/HR: at 05:20

## 2019-01-01 RX ADMIN — CHLORHEXIDINE GLUCONATE 0.12% ORAL RINSE 15 ML: 1.2 LIQUID ORAL at 09:17

## 2019-01-01 RX ADMIN — LEVOTHYROXINE SODIUM 75 MCG: 75 TABLET ORAL at 10:07

## 2019-01-01 RX ADMIN — SODIUM CHLORIDE, PRESERVATIVE FREE 300 UNITS: 5 INJECTION INTRAVENOUS at 07:30

## 2019-01-01 RX ADMIN — Medication 10 ML: at 09:00

## 2019-01-01 RX ADMIN — DEXMEDETOMIDINE 1.4 MCG/KG/HR: 100 INJECTION, SOLUTION, CONCENTRATE INTRAVENOUS at 18:16

## 2019-01-01 RX ADMIN — FORMOTEROL FUMARATE DIHYDRATE 20 MCG: 20 SOLUTION RESPIRATORY (INHALATION) at 07:43

## 2019-01-01 RX ADMIN — BUDESONIDE 1000 MCG: 0.5 SUSPENSION RESPIRATORY (INHALATION) at 20:36

## 2019-01-01 RX ADMIN — SODIUM CHLORIDE: 9 INJECTION, SOLUTION INTRAVENOUS at 23:57

## 2019-01-01 RX ADMIN — MAGNESIUM SULFATE HEPTAHYDRATE 1 G: 1 INJECTION, SOLUTION INTRAVENOUS at 10:11

## 2019-01-01 RX ADMIN — METOPROLOL TARTRATE 2.5 MG: 1 INJECTION, SOLUTION INTRAVENOUS at 09:55

## 2019-01-01 RX ADMIN — ASPIRIN 81 MG: 81 TABLET, COATED ORAL at 11:41

## 2019-01-01 RX ADMIN — LEVETIRACETAM 500 MG: 500 TABLET ORAL at 10:45

## 2019-01-01 RX ADMIN — IPRATROPIUM BROMIDE AND ALBUTEROL SULFATE 3 ML: .5; 3 SOLUTION RESPIRATORY (INHALATION) at 11:36

## 2019-01-01 RX ADMIN — NYSTATIN: 100000 OINTMENT TOPICAL at 09:08

## 2019-01-01 RX ADMIN — SODIUM CHLORIDE, PRESERVATIVE FREE 300 UNITS: 5 INJECTION INTRAVENOUS at 08:41

## 2019-01-01 RX ADMIN — CEFEPIME HYDROCHLORIDE 1 G: 1 INJECTION, POWDER, FOR SOLUTION INTRAMUSCULAR; INTRAVENOUS at 23:57

## 2019-01-01 RX ADMIN — POTASSIUM CHLORIDE 20 MEQ: 29.8 INJECTION, SOLUTION INTRAVENOUS at 09:15

## 2019-01-01 RX ADMIN — CEFEPIME HYDROCHLORIDE 2 G: 2 INJECTION, POWDER, FOR SOLUTION INTRAVENOUS at 07:12

## 2019-01-01 RX ADMIN — METOPROLOL TARTRATE 2.5 MG: 1 INJECTION, SOLUTION INTRAVENOUS at 02:04

## 2019-01-01 RX ADMIN — FORMOTEROL FUMARATE DIHYDRATE 20 MCG: 20 SOLUTION RESPIRATORY (INHALATION) at 09:10

## 2019-01-01 RX ADMIN — ENOXAPARIN SODIUM 30 MG: 30 INJECTION SUBCUTANEOUS at 08:20

## 2019-01-01 RX ADMIN — FORMOTEROL FUMARATE DIHYDRATE 20 MCG: 20 SOLUTION RESPIRATORY (INHALATION) at 22:00

## 2019-01-01 RX ADMIN — IPRATROPIUM BROMIDE AND ALBUTEROL SULFATE 3 ML: .5; 3 SOLUTION RESPIRATORY (INHALATION) at 09:19

## 2019-01-01 RX ADMIN — IPRATROPIUM BROMIDE AND ALBUTEROL SULFATE 3 ML: .5; 3 SOLUTION RESPIRATORY (INHALATION) at 16:56

## 2019-01-01 RX ADMIN — SUCCINYLCHOLINE CHLORIDE 100 MG: 20 INJECTION, SOLUTION INTRAMUSCULAR; INTRAVENOUS; PARENTERAL at 03:35

## 2019-01-01 RX ADMIN — FORMOTEROL FUMARATE DIHYDRATE 20 MCG: 20 SOLUTION RESPIRATORY (INHALATION) at 22:13

## 2019-01-01 RX ADMIN — IPRATROPIUM BROMIDE AND ALBUTEROL SULFATE 3 ML: .5; 3 SOLUTION RESPIRATORY (INHALATION) at 12:43

## 2019-01-01 RX ADMIN — IPRATROPIUM BROMIDE AND ALBUTEROL SULFATE 3 ML: .5; 3 SOLUTION RESPIRATORY (INHALATION) at 19:40

## 2019-01-01 RX ADMIN — METOPROLOL TARTRATE 2.5 MG: 1 INJECTION, SOLUTION INTRAVENOUS at 20:06

## 2019-01-01 RX ADMIN — IPRATROPIUM BROMIDE AND ALBUTEROL SULFATE 3 ML: .5; 3 SOLUTION RESPIRATORY (INHALATION) at 13:23

## 2019-01-01 RX ADMIN — IPRATROPIUM BROMIDE AND ALBUTEROL SULFATE 3 ML: .5; 3 SOLUTION RESPIRATORY (INHALATION) at 16:17

## 2019-01-01 RX ADMIN — MUPIROCIN: 20 OINTMENT TOPICAL at 08:13

## 2019-01-01 RX ADMIN — LINEZOLID 600 MG: 600 INJECTION, SOLUTION INTRAVENOUS at 11:20

## 2019-01-01 RX ADMIN — Medication 10 ML: at 17:15

## 2019-01-01 RX ADMIN — CEFAZOLIN 1000 MG: 1 INJECTION, POWDER, FOR SOLUTION INTRAMUSCULAR; INTRAVENOUS; PARENTERAL at 15:17

## 2019-01-01 RX ADMIN — DIGOXIN 250 MCG: 0.25 INJECTION INTRAMUSCULAR; INTRAVENOUS at 11:37

## 2019-01-01 RX ADMIN — BUDESONIDE 1000 MCG: 0.5 SUSPENSION RESPIRATORY (INHALATION) at 06:31

## 2019-01-01 RX ADMIN — IPRATROPIUM BROMIDE AND ALBUTEROL SULFATE 1 AMPULE: .5; 3 SOLUTION RESPIRATORY (INHALATION) at 11:18

## 2019-01-01 RX ADMIN — VANCOMYCIN HYDROCHLORIDE 500 MG: 500 INJECTION, POWDER, LYOPHILIZED, FOR SOLUTION INTRAVENOUS at 09:09

## 2019-01-01 RX ADMIN — FORMOTEROL FUMARATE DIHYDRATE 20 MCG: 20 SOLUTION RESPIRATORY (INHALATION) at 20:30

## 2019-01-01 RX ADMIN — CEFEPIME HYDROCHLORIDE 2 G: 2 INJECTION, POWDER, FOR SOLUTION INTRAVENOUS at 03:00

## 2019-01-01 RX ADMIN — IPRATROPIUM BROMIDE AND ALBUTEROL SULFATE 3 ML: .5; 3 SOLUTION RESPIRATORY (INHALATION) at 19:59

## 2019-01-01 RX ADMIN — GUAIFENESIN AND DEXTROMETHORPHAN 5 ML: 100; 10 SYRUP ORAL at 00:21

## 2019-01-01 RX ADMIN — METOPROLOL TARTRATE 50 MG: 50 TABLET, FILM COATED ORAL at 08:23

## 2019-01-01 RX ADMIN — IPRATROPIUM BROMIDE AND ALBUTEROL SULFATE 3 ML: .5; 3 SOLUTION RESPIRATORY (INHALATION) at 08:59

## 2019-01-01 RX ADMIN — METOPROLOL TARTRATE 25 MG: 25 TABLET ORAL at 08:13

## 2019-01-01 RX ADMIN — Medication 10 ML: at 15:59

## 2019-01-01 RX ADMIN — METOPROLOL TARTRATE 25 MG: 25 TABLET ORAL at 20:33

## 2019-01-01 RX ADMIN — METOPROLOL TARTRATE 25 MG: 25 TABLET ORAL at 22:42

## 2019-01-01 RX ADMIN — BUDESONIDE 1000 MCG: 0.5 SUSPENSION RESPIRATORY (INHALATION) at 09:03

## 2019-01-01 RX ADMIN — ATORVASTATIN CALCIUM 10 MG: 10 TABLET, FILM COATED ORAL at 21:34

## 2019-01-01 RX ADMIN — BUDESONIDE 500 MCG: 0.5 INHALANT RESPIRATORY (INHALATION) at 05:15

## 2019-01-01 RX ADMIN — SODIUM CHLORIDE: 4.5 INJECTION, SOLUTION INTRAVENOUS at 11:25

## 2019-01-01 RX ADMIN — IPRATROPIUM BROMIDE AND ALBUTEROL SULFATE 1 AMPULE: .5; 3 SOLUTION RESPIRATORY (INHALATION) at 02:44

## 2019-01-01 RX ADMIN — FORMOTEROL FUMARATE DIHYDRATE 20 MCG: 20 SOLUTION RESPIRATORY (INHALATION) at 21:31

## 2019-01-01 RX ADMIN — BUDESONIDE 1000 MCG: 0.5 SUSPENSION RESPIRATORY (INHALATION) at 20:43

## 2019-01-01 RX ADMIN — METOPROLOL TARTRATE 25 MG: 25 TABLET ORAL at 08:55

## 2019-01-01 RX ADMIN — BUDESONIDE 1000 MCG: 0.5 SUSPENSION RESPIRATORY (INHALATION) at 09:54

## 2019-01-01 RX ADMIN — IPRATROPIUM BROMIDE AND ALBUTEROL SULFATE 3 ML: .5; 3 SOLUTION RESPIRATORY (INHALATION) at 16:33

## 2019-01-01 RX ADMIN — LEVOTHYROXINE SODIUM 75 MCG: 75 TABLET ORAL at 05:21

## 2019-01-01 RX ADMIN — FUROSEMIDE 40 MG: 10 INJECTION, SOLUTION INTRAMUSCULAR; INTRAVENOUS at 11:54

## 2019-01-01 RX ADMIN — POTASSIUM CHLORIDE 10 MEQ: 7.46 INJECTION, SOLUTION INTRAVENOUS at 15:39

## 2019-01-01 RX ADMIN — WARFARIN SODIUM 2.5 MG: 5 TABLET ORAL at 17:20

## 2019-01-01 RX ADMIN — IPRATROPIUM BROMIDE AND ALBUTEROL SULFATE 3 ML: .5; 3 SOLUTION RESPIRATORY (INHALATION) at 16:20

## 2019-01-01 RX ADMIN — ATORVASTATIN CALCIUM 10 MG: 10 TABLET, FILM COATED ORAL at 21:32

## 2019-01-01 RX ADMIN — IPRATROPIUM BROMIDE AND ALBUTEROL SULFATE 3 ML: .5; 3 SOLUTION RESPIRATORY (INHALATION) at 13:46

## 2019-01-01 RX ADMIN — IPRATROPIUM BROMIDE AND ALBUTEROL SULFATE 3 ML: .5; 3 SOLUTION RESPIRATORY (INHALATION) at 13:22

## 2019-01-01 RX ADMIN — WARFARIN SODIUM 2 MG: 2 TABLET ORAL at 17:51

## 2019-01-01 RX ADMIN — METOPROLOL TARTRATE 25 MG: 25 TABLET ORAL at 08:42

## 2019-01-01 RX ADMIN — METHYLPREDNISOLONE SODIUM SUCCINATE 40 MG: 40 INJECTION, POWDER, FOR SOLUTION INTRAMUSCULAR; INTRAVENOUS at 13:32

## 2019-01-01 RX ADMIN — BUDESONIDE 1000 MCG: 0.5 SUSPENSION RESPIRATORY (INHALATION) at 09:58

## 2019-01-01 RX ADMIN — CEFEPIME HYDROCHLORIDE 2 G: 2 INJECTION, POWDER, FOR SOLUTION INTRAVENOUS at 02:37

## 2019-01-01 RX ADMIN — IPRATROPIUM BROMIDE AND ALBUTEROL SULFATE 3 ML: .5; 3 SOLUTION RESPIRATORY (INHALATION) at 09:25

## 2019-01-01 RX ADMIN — IPRATROPIUM BROMIDE AND ALBUTEROL SULFATE 3 ML: .5; 3 SOLUTION RESPIRATORY (INHALATION) at 16:13

## 2019-01-01 RX ADMIN — METHYLPREDNISOLONE SODIUM SUCCINATE 40 MG: 40 INJECTION, POWDER, FOR SOLUTION INTRAMUSCULAR; INTRAVENOUS at 14:19

## 2019-01-01 RX ADMIN — IPRATROPIUM BROMIDE AND ALBUTEROL SULFATE 1 AMPULE: .5; 3 SOLUTION RESPIRATORY (INHALATION) at 13:30

## 2019-01-01 RX ADMIN — MUPIROCIN: 20 OINTMENT TOPICAL at 07:32

## 2019-01-01 RX ADMIN — BARIUM SULFATE 115 ML: 960 POWDER, FOR SUSPENSION ORAL at 14:35

## 2019-01-01 RX ADMIN — Medication 10 ML: at 08:41

## 2019-01-01 RX ADMIN — MIDAZOLAM HYDROCHLORIDE 2 MG: 2 INJECTION, SOLUTION INTRAMUSCULAR; INTRAVENOUS at 11:42

## 2019-01-01 RX ADMIN — IPRATROPIUM BROMIDE AND ALBUTEROL SULFATE 1 AMPULE: .5; 3 SOLUTION RESPIRATORY (INHALATION) at 17:50

## 2019-01-01 RX ADMIN — Medication 10 ML: at 05:16

## 2019-01-01 RX ADMIN — LEVOFLOXACIN 750 MG: 5 INJECTION, SOLUTION INTRAVENOUS at 10:18

## 2019-01-01 RX ADMIN — ASPIRIN 81 MG: 81 TABLET, COATED ORAL at 09:15

## 2019-01-01 RX ADMIN — METOPROLOL TARTRATE 25 MG: 25 TABLET ORAL at 21:11

## 2019-01-01 RX ADMIN — Medication 10 ML: at 00:02

## 2019-01-01 RX ADMIN — DOCUSATE SODIUM 100 MG: 50 LIQUID ORAL at 09:17

## 2019-01-01 RX ADMIN — METHYLPREDNISOLONE SODIUM SUCCINATE 40 MG: 40 INJECTION, POWDER, FOR SOLUTION INTRAMUSCULAR; INTRAVENOUS at 14:24

## 2019-01-01 RX ADMIN — METHYLPREDNISOLONE SODIUM SUCCINATE 40 MG: 40 INJECTION, POWDER, FOR SOLUTION INTRAMUSCULAR; INTRAVENOUS at 13:54

## 2019-01-01 RX ADMIN — METHYLPREDNISOLONE SODIUM SUCCINATE 40 MG: 40 INJECTION, POWDER, FOR SOLUTION INTRAMUSCULAR; INTRAVENOUS at 05:54

## 2019-01-01 RX ADMIN — MORPHINE SULFATE 1 MG: 2 INJECTION, SOLUTION INTRAMUSCULAR; INTRAVENOUS at 20:22

## 2019-01-01 RX ADMIN — METOPROLOL TARTRATE 50 MG: 50 TABLET, FILM COATED ORAL at 10:07

## 2019-01-01 RX ADMIN — BUDESONIDE 1000 MCG: 0.5 SUSPENSION RESPIRATORY (INHALATION) at 09:16

## 2019-01-01 RX ADMIN — PANTOPRAZOLE SODIUM 40 MG: 40 INJECTION, POWDER, FOR SOLUTION INTRAVENOUS at 09:09

## 2019-01-01 RX ADMIN — IPRATROPIUM BROMIDE AND ALBUTEROL SULFATE 3 ML: .5; 3 SOLUTION RESPIRATORY (INHALATION) at 13:54

## 2019-01-01 RX ADMIN — IPRATROPIUM BROMIDE AND ALBUTEROL SULFATE 3 ML: .5; 3 SOLUTION RESPIRATORY (INHALATION) at 11:55

## 2019-01-01 RX ADMIN — Medication 10 ML: at 22:39

## 2019-01-01 RX ADMIN — METHYLPREDNISOLONE SODIUM SUCCINATE 40 MG: 40 INJECTION, POWDER, FOR SOLUTION INTRAMUSCULAR; INTRAVENOUS at 06:31

## 2019-01-01 RX ADMIN — DIGOXIN 125 MCG: 0.25 INJECTION INTRAMUSCULAR; INTRAVENOUS at 23:37

## 2019-01-01 RX ADMIN — METOPROLOL TARTRATE 25 MG: 25 TABLET ORAL at 12:16

## 2019-01-01 RX ADMIN — CHLORHEXIDINE GLUCONATE 0.12% ORAL RINSE 15 ML: 1.2 LIQUID ORAL at 11:20

## 2019-01-01 RX ADMIN — METHYLPREDNISOLONE SODIUM SUCCINATE 40 MG: 40 INJECTION, POWDER, FOR SOLUTION INTRAMUSCULAR; INTRAVENOUS at 06:40

## 2019-01-01 RX ADMIN — Medication 10 ML: at 16:33

## 2019-01-01 RX ADMIN — POTASSIUM CHLORIDE 20 MEQ: 29.8 INJECTION, SOLUTION INTRAVENOUS at 10:07

## 2019-01-01 RX ADMIN — LEVOTHYROXINE SODIUM 75 MCG: 75 TABLET ORAL at 06:28

## 2019-01-01 RX ADMIN — DOCUSATE SODIUM 100 MG: 50 LIQUID ORAL at 07:31

## 2019-01-01 RX ADMIN — MUPIROCIN: 20 OINTMENT TOPICAL at 21:51

## 2019-01-01 RX ADMIN — IPRATROPIUM BROMIDE AND ALBUTEROL SULFATE 3 ML: .5; 3 SOLUTION RESPIRATORY (INHALATION) at 20:36

## 2019-01-01 RX ADMIN — WARFARIN SODIUM 2.5 MG: 5 TABLET ORAL at 18:35

## 2019-01-01 RX ADMIN — METOPROLOL TARTRATE 25 MG: 25 TABLET ORAL at 20:25

## 2019-01-01 RX ADMIN — DOCUSATE SODIUM 100 MG: 50 LIQUID ORAL at 08:13

## 2019-01-01 RX ADMIN — FORMOTEROL FUMARATE DIHYDRATE 20 MCG: 20 SOLUTION RESPIRATORY (INHALATION) at 19:49

## 2019-01-01 RX ADMIN — IPRATROPIUM BROMIDE AND ALBUTEROL SULFATE 3 ML: .5; 3 SOLUTION RESPIRATORY (INHALATION) at 22:14

## 2019-01-01 RX ADMIN — SODIUM CHLORIDE: 4.5 INJECTION, SOLUTION INTRAVENOUS at 00:48

## 2019-01-01 RX ADMIN — BUDESONIDE 1000 MCG: 0.5 SUSPENSION RESPIRATORY (INHALATION) at 20:49

## 2019-01-01 RX ADMIN — CEFEPIME HYDROCHLORIDE 2 G: 2 INJECTION, POWDER, FOR SOLUTION INTRAVENOUS at 14:24

## 2019-01-01 RX ADMIN — LEVOTHYROXINE SODIUM 75 MCG: 75 TABLET ORAL at 06:31

## 2019-01-01 RX ADMIN — CEFEPIME HYDROCHLORIDE 2 G: 2 INJECTION, POWDER, FOR SOLUTION INTRAVENOUS at 02:04

## 2019-01-01 RX ADMIN — IPRATROPIUM BROMIDE AND ALBUTEROL SULFATE 3 ML: .5; 3 SOLUTION RESPIRATORY (INHALATION) at 15:38

## 2019-01-01 RX ADMIN — CEFEPIME HYDROCHLORIDE 2 G: 2 INJECTION, POWDER, FOR SOLUTION INTRAVENOUS at 06:00

## 2019-01-01 RX ADMIN — METOPROLOL TARTRATE 50 MG: 50 TABLET, FILM COATED ORAL at 08:20

## 2019-01-01 RX ADMIN — ATORVASTATIN CALCIUM 10 MG: 10 TABLET, FILM COATED ORAL at 08:28

## 2019-01-01 RX ADMIN — BUDESONIDE 1000 MCG: 0.5 SUSPENSION RESPIRATORY (INHALATION) at 19:53

## 2019-01-01 RX ADMIN — CEFEPIME HYDROCHLORIDE 2 G: 2 INJECTION, POWDER, FOR SOLUTION INTRAVENOUS at 22:02

## 2019-01-01 RX ADMIN — IPRATROPIUM BROMIDE AND ALBUTEROL SULFATE 3 ML: .5; 3 SOLUTION RESPIRATORY (INHALATION) at 10:48

## 2019-01-01 RX ADMIN — ATORVASTATIN CALCIUM 10 MG: 10 TABLET, FILM COATED ORAL at 09:08

## 2019-01-01 RX ADMIN — SODIUM CHLORIDE: 4.5 INJECTION, SOLUTION INTRAVENOUS at 14:34

## 2019-01-01 RX ADMIN — IPRATROPIUM BROMIDE AND ALBUTEROL SULFATE 3 ML: .5; 3 SOLUTION RESPIRATORY (INHALATION) at 16:19

## 2019-01-01 RX ADMIN — FORMOTEROL FUMARATE DIHYDRATE 20 MCG: 20 SOLUTION RESPIRATORY (INHALATION) at 21:52

## 2019-01-01 RX ADMIN — SODIUM CHLORIDE: 4.5 INJECTION, SOLUTION INTRAVENOUS at 22:56

## 2019-01-01 RX ADMIN — METHYLPREDNISOLONE SODIUM SUCCINATE 40 MG: 40 INJECTION, POWDER, FOR SOLUTION INTRAMUSCULAR; INTRAVENOUS at 06:54

## 2019-01-01 RX ADMIN — IPRATROPIUM BROMIDE AND ALBUTEROL SULFATE 1 AMPULE: .5; 3 SOLUTION RESPIRATORY (INHALATION) at 02:42

## 2019-01-01 RX ADMIN — LEVETIRACETAM 500 MG: 500 TABLET ORAL at 20:05

## 2019-01-01 RX ADMIN — IPRATROPIUM BROMIDE AND ALBUTEROL SULFATE 3 ML: .5; 3 SOLUTION RESPIRATORY (INHALATION) at 08:18

## 2019-01-01 RX ADMIN — METOPROLOL TARTRATE 2.5 MG: 1 INJECTION, SOLUTION INTRAVENOUS at 13:52

## 2019-01-01 RX ADMIN — PANTOPRAZOLE SODIUM 40 MG: 40 INJECTION, POWDER, FOR SOLUTION INTRAVENOUS at 08:28

## 2019-01-01 RX ADMIN — IPRATROPIUM BROMIDE AND ALBUTEROL SULFATE 3 ML: .5; 3 SOLUTION RESPIRATORY (INHALATION) at 19:52

## 2019-01-01 RX ADMIN — METHYLPREDNISOLONE SODIUM SUCCINATE 40 MG: 40 INJECTION, POWDER, FOR SOLUTION INTRAMUSCULAR; INTRAVENOUS at 04:04

## 2019-01-01 RX ADMIN — IPRATROPIUM BROMIDE AND ALBUTEROL SULFATE 1 AMPULE: .5; 3 SOLUTION RESPIRATORY (INHALATION) at 17:19

## 2019-01-01 RX ADMIN — GUAIFENESIN AND DEXTROMETHORPHAN 5 ML: 100; 10 SYRUP ORAL at 22:59

## 2019-01-01 RX ADMIN — LEVOTHYROXINE SODIUM 75 MCG: 75 TABLET ORAL at 09:16

## 2019-01-01 RX ADMIN — DOCUSATE SODIUM 100 MG: 50 LIQUID ORAL at 08:40

## 2019-01-01 RX ADMIN — MUPIROCIN: 20 OINTMENT TOPICAL at 14:28

## 2019-01-01 RX ADMIN — LEVOTHYROXINE SODIUM 75 MCG: 75 TABLET ORAL at 06:20

## 2019-01-01 RX ADMIN — DIGOXIN 125 MCG: 0.25 INJECTION INTRAMUSCULAR; INTRAVENOUS at 15:32

## 2019-01-01 RX ADMIN — METOPROLOL TARTRATE 50 MG: 50 TABLET, FILM COATED ORAL at 22:38

## 2019-01-01 RX ADMIN — METHYLPREDNISOLONE SODIUM SUCCINATE 40 MG: 40 INJECTION, POWDER, FOR SOLUTION INTRAMUSCULAR; INTRAVENOUS at 13:28

## 2019-01-01 RX ADMIN — CEFEPIME HYDROCHLORIDE 2 G: 2 INJECTION, POWDER, FOR SOLUTION INTRAVENOUS at 14:17

## 2019-01-01 RX ADMIN — DEXMEDETOMIDINE 1.4 MCG/KG/HR: 100 INJECTION, SOLUTION, CONCENTRATE INTRAVENOUS at 00:35

## 2019-01-01 RX ADMIN — METHYLPREDNISOLONE SODIUM SUCCINATE 40 MG: 40 INJECTION, POWDER, FOR SOLUTION INTRAMUSCULAR; INTRAVENOUS at 23:03

## 2019-01-01 RX ADMIN — CEFEPIME HYDROCHLORIDE 2 G: 2 INJECTION, POWDER, FOR SOLUTION INTRAVENOUS at 02:30

## 2019-01-01 RX ADMIN — BUDESONIDE 1000 MCG: 0.5 SUSPENSION RESPIRATORY (INHALATION) at 09:10

## 2019-01-01 RX ADMIN — SODIUM CHLORIDE: 4.5 INJECTION, SOLUTION INTRAVENOUS at 09:55

## 2019-01-01 RX ADMIN — FORMOTEROL FUMARATE DIHYDRATE 20 MCG: 20 SOLUTION RESPIRATORY (INHALATION) at 09:58

## 2019-01-01 RX ADMIN — METOPROLOL TARTRATE 25 MG: 25 TABLET ORAL at 09:21

## 2019-01-01 ASSESSMENT — PULMONARY FUNCTION TESTS
PIF_VALUE: 30
PIF_VALUE: 31
PIF_VALUE: 31
PIF_VALUE: 39
PIF_VALUE: 42
PIF_VALUE: 37
PIF_VALUE: 20
PIF_VALUE: 33
PIF_VALUE: 29
PIF_VALUE: 35
PIF_VALUE: 31
PIF_VALUE: 33
PIF_VALUE: 29
PIF_VALUE: 36
PIF_VALUE: 41
PIF_VALUE: 30
PIF_VALUE: 32
PIF_VALUE: 28
PIF_VALUE: 33
PIF_VALUE: 39
PIF_VALUE: 30
PIF_VALUE: 39
PIF_VALUE: 36
PIF_VALUE: 30
PIF_VALUE: 31
PIF_VALUE: 31
PIF_VALUE: 29
PIF_VALUE: 30
PIF_VALUE: 45
PIF_VALUE: 33
PIF_VALUE: 30
PIF_VALUE: 35
PIF_VALUE: 30
PIF_VALUE: 33
PIF_VALUE: 31
PIF_VALUE: 34
PIF_VALUE: 20
PIF_VALUE: 24
PIF_VALUE: 32
PIF_VALUE: 32
PIF_VALUE: 30
PIF_VALUE: 20
PIF_VALUE: 40
PIF_VALUE: 23
PIF_VALUE: 30
PIF_VALUE: 29
PIF_VALUE: 26
PIF_VALUE: 31
PIF_VALUE: 31
PIF_VALUE: 29
PIF_VALUE: 35
PIF_VALUE: 23
PIF_VALUE: 33
PIF_VALUE: 34
PIF_VALUE: 42
PIF_VALUE: 29
PIF_VALUE: 20
PIF_VALUE: 35
PIF_VALUE: 43
PIF_VALUE: 37
PIF_VALUE: 37
PIF_VALUE: 33
PIF_VALUE: 31
PIF_VALUE: 25
PIF_VALUE: 30
PIF_VALUE: 28
PIF_VALUE: 22
PIF_VALUE: 31
PIF_VALUE: 31
PIF_VALUE: 30
PIF_VALUE: 28
PIF_VALUE: 37
PIF_VALUE: 32
PIF_VALUE: 40
PIF_VALUE: 29
PIF_VALUE: 30
PIF_VALUE: 33
PIF_VALUE: 39
PIF_VALUE: 36
PIF_VALUE: 41
PIF_VALUE: 31
PIF_VALUE: 35
PIF_VALUE: 48
PIF_VALUE: 31
PIF_VALUE: 32
PIF_VALUE: 29
PIF_VALUE: 36
PIF_VALUE: 47
PIF_VALUE: 30
PIF_VALUE: 41
PIF_VALUE: 25
PIF_VALUE: 35
PIF_VALUE: 29
PIF_VALUE: 35
PIF_VALUE: 31
PIF_VALUE: 42
PIF_VALUE: 31
PIF_VALUE: 31
PIF_VALUE: 39
PIF_VALUE: 33
PIF_VALUE: 30
PIF_VALUE: 18
PIF_VALUE: 29
PIF_VALUE: 22
PIF_VALUE: 32
PIF_VALUE: 35
PIF_VALUE: 31
PIF_VALUE: 31
PIF_VALUE: 29
PIF_VALUE: 24
PIF_VALUE: 36
PIF_VALUE: 31
PIF_VALUE: 37
PIF_VALUE: 30
PIF_VALUE: 26
PIF_VALUE: 33
PIF_VALUE: 23
PIF_VALUE: 40
PIF_VALUE: 33
PIF_VALUE: 30
PIF_VALUE: 37
PIF_VALUE: 34
PIF_VALUE: 38
PIF_VALUE: 31
PIF_VALUE: 28
PIF_VALUE: 34
PIF_VALUE: 46
PIF_VALUE: 28
PIF_VALUE: 31
PIF_VALUE: 37
PIF_VALUE: 22
PIF_VALUE: 28
PIF_VALUE: 24
PIF_VALUE: 23
PIF_VALUE: 38
PIF_VALUE: 30
PIF_VALUE: 34
PIF_VALUE: 29
PIF_VALUE: 31
PIF_VALUE: 37
PIF_VALUE: 0
PIF_VALUE: 37
PIF_VALUE: 33
PIF_VALUE: 23
PIF_VALUE: 34
PIF_VALUE: 32
PIF_VALUE: 47
PIF_VALUE: 36
PIF_VALUE: 31
PIF_VALUE: 49
PIF_VALUE: 26
PIF_VALUE: 32
PIF_VALUE: 24
PIF_VALUE: 31
PIF_VALUE: 16
PIF_VALUE: 35
PIF_VALUE: 31
PIF_VALUE: 43
PIF_VALUE: 32
PIF_VALUE: 33
PIF_VALUE: 23
PIF_VALUE: 37
PIF_VALUE: 33
PIF_VALUE: 26
PIF_VALUE: 21
PIF_VALUE: 31
PIF_VALUE: 25
PIF_VALUE: 33
PIF_VALUE: 28
PIF_VALUE: 29
PIF_VALUE: 31
PIF_VALUE: 31
PIF_VALUE: 41
PIF_VALUE: 29
PIF_VALUE: 30
PIF_VALUE: 31
PIF_VALUE: 30
PIF_VALUE: 41
PIF_VALUE: 30
PIF_VALUE: 31
PIF_VALUE: 26
PIF_VALUE: 42
PIF_VALUE: 31
PIF_VALUE: 30
PIF_VALUE: 28
PIF_VALUE: 30
PIF_VALUE: 50
PIF_VALUE: 20
PIF_VALUE: 31
PIF_VALUE: 26
PIF_VALUE: 30
PIF_VALUE: 33
PIF_VALUE: 33
PIF_VALUE: 37
PIF_VALUE: 24
PIF_VALUE: 29
PIF_VALUE: 29
PIF_VALUE: 31
PIF_VALUE: 39
PIF_VALUE: 33
PIF_VALUE: 29
PIF_VALUE: 32
PIF_VALUE: 37
PIF_VALUE: 29
PIF_VALUE: 30
PIF_VALUE: 24
PIF_VALUE: 32
PIF_VALUE: 31
PIF_VALUE: 37
PIF_VALUE: 34

## 2019-01-01 ASSESSMENT — PAIN SCALES - GENERAL
PAINLEVEL_OUTOF10: 0
PAINLEVEL_OUTOF10: 2
PAINLEVEL_OUTOF10: 0
PAINLEVEL_OUTOF10: 5
PAINLEVEL_OUTOF10: 0
PAINLEVEL_OUTOF10: 3
PAINLEVEL_OUTOF10: 0
PAINLEVEL_OUTOF10: 0
PAINLEVEL_OUTOF10: 3
PAINLEVEL_OUTOF10: 2
PAINLEVEL_OUTOF10: 0
PAINLEVEL_OUTOF10: 1
PAINLEVEL_OUTOF10: 0
PAINLEVEL_OUTOF10: 2
PAINLEVEL_OUTOF10: 0
PAINLEVEL_OUTOF10: 3

## 2019-01-01 ASSESSMENT — PAIN DESCRIPTION - FREQUENCY
FREQUENCY: CONTINUOUS
FREQUENCY: CONTINUOUS
FREQUENCY: INTERMITTENT

## 2019-01-01 ASSESSMENT — PAIN DESCRIPTION - ONSET
ONSET: ON-GOING
ONSET: GRADUAL
ONSET: ON-GOING

## 2019-01-01 ASSESSMENT — ENCOUNTER SYMPTOMS
WHEEZING: 0
PHOTOPHOBIA: 0
SHORTNESS OF BREATH: 1
BACK PAIN: 0
RHINORRHEA: 0
CONSTIPATION: 0
DIARRHEA: 0
SINUS PRESSURE: 0
SORE THROAT: 0
ABDOMINAL PAIN: 0
SHORTNESS OF BREATH: 1
SINUS PAIN: 0
NAUSEA: 0
STRIDOR: 0
VOMITING: 0
COUGH: 0

## 2019-01-01 ASSESSMENT — PAIN DESCRIPTION - LOCATION
LOCATION: GENERALIZED
LOCATION: ABDOMEN
LOCATION: CHEST
LOCATION: ABDOMEN

## 2019-01-01 ASSESSMENT — PAIN DESCRIPTION - PAIN TYPE
TYPE: ACUTE PAIN

## 2019-01-01 ASSESSMENT — PAIN DESCRIPTION - PROGRESSION
CLINICAL_PROGRESSION: NOT CHANGED
CLINICAL_PROGRESSION: NOT CHANGED

## 2019-01-01 ASSESSMENT — PAIN DESCRIPTION - DESCRIPTORS
DESCRIPTORS: DISCOMFORT;CONSTANT
DESCRIPTORS: DISCOMFORT;CONSTANT
DESCRIPTORS: PATIENT UNABLE TO DESCRIBE

## 2019-01-01 ASSESSMENT — PAIN DESCRIPTION - ORIENTATION
ORIENTATION: MID
ORIENTATION: LEFT
ORIENTATION: LEFT

## 2019-01-11 PROBLEM — J90 PLEURAL EFFUSION: Status: ACTIVE | Noted: 2019-01-01

## 2019-01-11 PROBLEM — I25.10 CAD (CORONARY ARTERY DISEASE), NATIVE CORONARY ARTERY: Chronic | Status: ACTIVE | Noted: 2019-01-01

## 2019-01-11 PROBLEM — J96.21 ACUTE ON CHRONIC RESPIRATORY FAILURE WITH HYPOXEMIA (HCC): Status: ACTIVE | Noted: 2019-01-01

## 2019-01-11 PROBLEM — S06.5XAA SDH (SUBDURAL HEMATOMA): Status: RESOLVED | Noted: 2018-01-01 | Resolved: 2019-01-01

## 2019-01-11 PROBLEM — I10 HTN (HYPERTENSION), BENIGN: Chronic | Status: ACTIVE | Noted: 2019-01-01

## 2019-03-23 PROBLEM — J18.9 PNEUMONIA: Status: ACTIVE | Noted: 2019-01-01

## 2019-03-23 PROBLEM — J18.9 PNA (PNEUMONIA): Status: ACTIVE | Noted: 2019-01-01

## 2019-03-23 PROBLEM — J96.90 RESPIRATORY FAILURE (HCC): Status: ACTIVE | Noted: 2019-01-01

## 2019-03-23 NOTE — ED NOTES
Blood cultures obtained from RW(SITE), per policy. Set one of two drawn at this time.              Peggy Carissa, RN  03/22/19 Willian Nelson 44 Blair Khan RN  03/22/19 9949

## 2019-03-23 NOTE — ED NOTES
Blood cultures obtained from LW(SITE), per policy. Set two of two drawn at this time.              Shaneka Willis RN  03/22/19 8737

## 2019-03-23 NOTE — H&P
Liu Schroeder M.D. History and Physical      CHIEF COMPLAINT:  Fevers, weakness , shortness of breath     Reason for Admission:  Acute resp failure   History Obtained From: EMR     HISTORY OF PRESENT ILLNESS:      The patient is a 80 y.o. female of Haley Chen MD with significant past medical history of cad/ multiple medical problems , atrial fib on [address] [place] [address] Road  who presents with complaints of not being able to breathe at nursing facility . CXR done there revealed pna. She was initiated on Rocephin and doxy but did not improve. On arrival to ED she continued to deteriorate and required intubation. She was transferred to MICU . On my eval - awake but not responsive . film array with human meta pneumovirus   Hypercarbic resp failure. Elevated trop at .04. cxr with right lung infiltrate . BP (!) 131/56   Pulse 123   Temp 97.1 °F (36.2 °C) (Axillary)   Resp 16   Ht 5' (1.524 m)   Wt 100 lb (45.4 kg)   SpO2 100%   BMI 19.53 kg/m²       Past Medical History:        Diagnosis Date    Acute MI, inferior wall ([address] 1/2004    CAD (coronary artery disease)     Cardiogenic shock (HCC)     Subtotal subacute thrombosis RCA.  CHB (complete heart block) ([address] 01/2004    Transient CHB    Dermatitis     Hypercholesterolemia     Hyperlipidemia     Hypertension     Hypothyroidism     Paroxysmal atrial fibrillation ([address]     Pneumonia 9/20/2014    Shingles     Thyroid disease     Warfarin-induced coagulopathy ([address] 9/20/2014     Past Surgical History:        Procedure Laterality Date    ANGIOPLASTY  2004    APPENDECTOMY      CARDIAC PACEMAKER PLACEMENT Left 01/23/2004    [facility] Programmed VVIR. Failed attempt to insert atrial leads. Adequate thresholds could not be obtained trying two different electrodes. Therefore, the atrial lead was removed and the pulse generator atrial port was cancelled with an indiffferent short electrode block.       COLONOSCOPY 04/06/2011    CORONARY ANGIOPLASTY WITH STENT PLACEMENT  1/2009    Dilatation stent of 1/2009 - addition stents x 3.    DIAGNOSTIC CARDIAC CATH LAB PROCEDURE  1/9/2004    [facility]:  Subtotal RCA, partially successful PTCA - stent RCA.  DIAGNOSTIC CARDIAC CATH LAB PROCEDURE  1/12/2--4    [facility]: Acute total occlusion RCA stent.  ECHO COMPL W DOP COLOR FLOW  5/2/2013         HEMORRHOID SURGERY      THROMBECTOMY      [employer].  TOTAL HIP ARTHROPLASTY  7/30/2005 Right. 12/2005 Left.  UPPER GASTROINTESTINAL ENDOSCOPY  8/2004    With colonoscopy. Medications Prior to Admission:    Medications Prior to Admission: cefTRIAXone (ROCEPHIN) 1 g injection, Infuse 1 g intravenously every 24 hours  docusate sodium (COLACE) 100 MG capsule, Take 100 mg by mouth daily  famotidine (PEPCID) 20 MG tablet, Take 20 mg by mouth daily  ammonium lactate (LAC-HYDRIN) 12 % lotion, Apply topically as needed for Dry Skin (apply to both feet every night until healed) Apply topically as needed. polyethylene glycol (GLYCOLAX) packet, Take 17 g by mouth daily  methylPREDNISolone sodium (SOLU-MEDROL) 40 MG injection, Infuse 40 mg intravenously daily  Cholecalciferol (VITAMIN D3) 5000 units TABS, Take 1 tablet by mouth daily  warfarin (COUMADIN) 4 MG tablet, Take 4 mg by mouth daily  doxycycline hyclate (VIBRA-TABS) 100 MG tablet, Take 100 mg by mouth 2 times daily  dronabinol (MARINOL) 2.5 MG capsule, Take 2.5 mg by mouth 2 times daily (before meals).   Nutritional Supplements (ENSURE PLUS) LIQD, Take 237 mLs by mouth 3 times daily  ipratropium-albuterol (DUONEB) 0.5-2.5 (3) MG/3ML SOLN nebulizer solution, Inhale 3 mLs into the lungs every 4 hours (while awake)  Fluticasone-Umeclidin-Vilant (TRELEGY ELLIPTA) 100-62.5-25 MCG/INH AEPB, Inhale 1 actuation into the lungs daily   acetaminophen (TYLENOL) 500 MG tablet, Take 500 mg by mouth every 6 hours as needed for Pain  atorvastatin (LIPITOR) 10 MG tablet, TAKE 1 TABLET BY MOUTH EVERY DAY  metoprolol tartrate (LOPRESSOR) 50 MG tablet, Take 1 tablet by mouth 2 times daily  ascorbic acid (VITAMIN C) 500 MG tablet, Take 500 mg by mouth daily. calcium-vitamin D (OSCAL-500) 500-200 MG-UNIT per tablet, Take 1 tablet by mouth daily. multivitamin (THERAGRAN) per tablet, Take 1 tablet by mouth daily. Allergies:  Patient has no known allergies. Social History:   TOBACCO:   reports that she quit smoking about 14 years ago. Her smoking use included cigarettes. She started smoking about 67 years ago. She has a 40.00 pack-year smoking history. She has never used smokeless tobacco.  ETOH:   reports that she does not drink alcohol. MARITAL STATUS:    OCCUPATION:      Family History:       Problem Relation Age of Onset    High Blood Pressure Mother     Stroke Mother        REVIEW OF SYSTEMS:    General ROS: positive for  - chills, fatigue, fever and malaise  Hematological and Lymphatic ROS: negative  Endocrine ROS: negative  Respiratory ROS: no cough, shortness of breath, or wheezing  Cardiovascular ROS: no chest pain or dyspnea on exertion  Gastrointestinal ROS: no abdominal pain, change in bowel habits, or black or bloody stools  Genito-Urinary ROS: no dysuria, trouble voiding, or hematuria  Neurological ROS: no TIA or stroke symptoms  negative    Vitals:  BP (!) 131/56   Pulse 124   Temp 97.1 °F (36.2 °C) (Axillary)   Resp 16   Ht 5' (1.524 m)   Wt 100 lb (45.4 kg)   SpO2 100%   BMI 19.53 kg/m²     PHYSICAL EXAM:  General:  Awake, intubated   Well developed, well nourished, well groomed. No apparent distress. HEENT:  Normocephalic, atraumatic. Pupils equal, round, reactive to light. No scleral icterus. No conjunctival injection. Normal lips, teeth, and gums. No nasal discharge. Neck:  Supple  Heart:  RRR, no murmurs, gallops, rubs, carotid upstroke normal, no carotid bruits  Lungs:  CTA bilaterally, bilat symmetrical expansion, no wheeze, rales, or rhonchi  Abdomen:   Bowel sounds present, soft, nontender, no masses, no organomegaly, no peritoneal signs  Extremities:  No clubbing, cyanosis, or edema  Skin:  Warm and dry, no open lesions or rash  Neuro:  Cranial nerves 2-12 intact, no focal deficits  Breast: deferred  Rectal: deferred  Genitalia:  deferred      DATA:     Recent Labs     03/22/19 2335 03/23/19  0755   WBC 7.1 7.0   HGB 12.1 9.8*    184     Recent Labs     03/22/19 2335 03/23/19  0309 03/23/19  0755   *  --  147*   K 4.5 4.47 3.8   BUN 50*  --  47*   CREATININE 1.0  --  1.0     Recent Labs     03/22/19 2335 03/23/19  0755   PROT 7.4 5.9*   INR 3.6  --      Recent Labs     03/22/19 2335 03/23/19  0755   AST 33* 31   ALT 21 18   ALKPHOS 73 54   BILITOT 0.2 0.4     No results for input(s): BNP in the last 72 hours. Recent Labs     03/22/19  2335   TROPONINI 0.04*       ASSESSMENT:      Principal Problem:    Pneumonia  Active Problems:    COPD with acute exacerbation (HCC)    Pulmonary hypertension (HCC)    History of ST elevation myocardial infarction (STEMI)    CHB (complete heart block) (HCC)    Paroxysmal atrial fibrillation    Mixed hyperlipidemia    Pleural effusion    HTN (hypertension), benign    CAD (coronary artery disease), native coronary artery    PNA (pneumonia)    Respiratory failure (HCC)  Resolved Problems:    * No resolved hospital problems. *        PLAN:    Admitted to ICU for septic shock with Acute hypercarbic hypoxemic resp failure   BS avbx therapy pending pan cultures  Pressor support as warranted   . 39 nss for hypernatremia   procal elevated at .75 -   Viral panel pos for human meta pneumovirus- supportive care   Wean to extubate per CC team   hold oac d/t supra therapeutic inr   Monitor for afib/ rvr given acute  illness     DVT Proph  Pt/OT   Dc planning     Maureen Kennedy MD  3/23/2019  8:53 AM

## 2019-03-23 NOTE — ED NOTES
Bed: 17B-17  Expected date:   Expected time:   Means of arrival:   Comments:  HELD: EMS      Alecia Gill RN  03/22/19 6333

## 2019-03-23 NOTE — ED NOTES
Respiratory and Dr. Temitope Yoo and Dr. Vicenta Mills at bedside. Pt's respirations 30-40/min. Pt remains A&O, denies pain. Pt unable to transfer to floor at this time. ABGs obtained and sent with respiratory to be run.       Tiki Mann RN  03/23/19 2478

## 2019-03-23 NOTE — PROGRESS NOTES
03/23/19 0505   Patient Transport   Time spent transporting 16-30   Transport ventillation type Transport vent   Transport from ER   Transport destination ICU   Emergency equipment included Yes   Patient transported from ER to [phone] no issues placed on 980

## 2019-03-23 NOTE — CONSULTS
[facility]  Internal Medicine Residency Program  History and Physical  MICU    Patient:  Leora Knapp 80 y.o. female MRN: [id number]     Date of Service: 3/23/2019    Hospital Day: 2      Chief complaint: SOB, fever  History of Present Illness   The history was gotten from the EMR as patient arrived in the MICU intubated and sedated. The patient is a 80 y.o. female with Pmhx of COPD on 2.5 L of home oxygen, paroxysmal A fib on [facility]; HLD; CAD s/p PCI with ED in 2009, Complete heart block, s/p pacemaker placement [id number]; hypothyroidism; HTN, HLD, who presented to the ED via EMS on account of Sob and fever of a day's duration. She is from a SNF    At the Ed,she was awake and alert, but in mild respiratory distress. Initial vitals were wnl. Labs were significant for a hypernatremia of 149; HCO3 of 35; BG of 131; troponin of 0.04; LA was 0.7; UA showed proteinuria with rare casts, no bacteria; EKG revealed sinus tachycardia. INR was 3.6; and ABG revealed respiratory acidosis with compensatory metabolic alkalosis. She was intubated due to continued respiratory distress; and started on cefepime and vancomycin. She was transferred to the MICU for further management. Past Medical History:      Diagnosis Date    Acute MI, inferior wall ([address] 1/2004    CAD (coronary artery disease)     Cardiogenic shock (HCC)     Subtotal subacute thrombosis RCA.  CHB (complete heart block) ([address] 01/2004    Transient CHB    Dermatitis     Hypercholesterolemia     Hyperlipidemia     Hypertension     Hypothyroidism     Paroxysmal atrial fibrillation ([address]     Pneumonia 9/20/2014    Shingles     Thyroid disease     Warfarin-induced coagulopathy ([address] 9/20/2014       Past Surgical History:        Procedure Laterality Date    ANGIOPLASTY  2004    APPENDECTOMY      CARDIAC PACEMAKER PLACEMENT Left 01/23/2004    [facility] 1298 Programmed VVIR. Failed attempt to insert atrial leads.  Adequate thresholds could not be obtained trying two different electrodes. Therefore, the atrial lead was removed and the pulse generator atrial port was cancelled with an indiffferent short electrode block.  COLONOSCOPY  04/06/2011    CORONARY ANGIOPLASTY WITH STENT PLACEMENT  1/2009    Dilatation stent of 1/2009 - addition stents x 3.    DIAGNOSTIC CARDIAC CATH LAB PROCEDURE  1/9/2004    [facility]:  Subtotal RCA, partially successful PTCA - stent RCA.  DIAGNOSTIC CARDIAC CATH LAB PROCEDURE  1/12/2--4    [facility]: Acute total occlusion RCA stent.  ECHO COMPL W DOP COLOR FLOW  5/2/2013         HEMORRHOID SURGERY      THROMBECTOMY      [employer].  TOTAL HIP ARTHROPLASTY  7/30/2005 Right. 12/2005 Left.  UPPER GASTROINTESTINAL ENDOSCOPY  8/2004    With colonoscopy. Medications Prior to Admission:    Prior to Admission medications    Medication Sig Start Date End Date Taking? Authorizing Provider   cefTRIAXone (ROCEPHIN) 1 g injection Infuse 1 g intravenously every 24 hours 3/21/19 3/27/19 Yes Historical Provider, MD   docusate sodium (COLACE) 100 MG capsule Take 100 mg by mouth daily   Yes Historical Provider, MD   famotidine (PEPCID) 20 MG tablet Take 20 mg by mouth daily   Yes Historical Provider, MD   ammonium lactate (LAC-HYDRIN) 12 % lotion Apply topically as needed for Dry Skin (apply to both feet every night until healed) Apply topically as needed.    Yes Historical Provider, MD   polyethylene glycol (GLYCOLAX) packet Take 17 g by mouth daily   Yes Historical Provider, MD   methylPREDNISolone sodium (SOLU-MEDROL) 40 MG injection Infuse 40 mg intravenously daily 3/21/19 3/23/19 Yes Historical Provider, MD   Cholecalciferol (VITAMIN D3) 5000 units TABS Take 1 tablet by mouth daily   Yes Historical Provider, MD   warfarin (COUMADIN) 4 MG tablet Take 4 mg by mouth daily 3/20/19  Yes Historical Provider, MD   doxycycline hyclate (VIBRA-TABS) 100 MG tablet Take 100 mg by mouth 2 times daily 3/21/19 3/27/19 · General Appearance: Intubated, sedated and mechanically ventilated, not in obvious distress, cachetic  · Skin: warm and dry  · Head: normocephalic and atraumatic  · Eyes: Pupils pin point and reactive to light  · Pulmonary/Chest: no chest wall tenderness and rhonchi present- b/l, no wheezing  · Cardiovascular: normal rate, normal S1 and S2, no gallops, intact distal pulses and no carotid bruits  · Abdomen: soft, non-tender, non-distended, normal bowel sounds, no masses or organomegaly  · Extremities: no cyanosis, no clubbing and no edema  · Musculoskeletal: normal range of motion, no joint swelling, deformity or tenderness  · Neurologic: Sedated     Lines     site day    Art line   None    TLC None    PICC None    Hemoaccess Left forearm and right wrist    Oxygen:     none  Mechanical Ventilation:   Mode: A/C SIMV  PS  low tidal   TV:380  ml RR: 16  PEEP 5cmH2O PS 0  FiO2 50  ABG:     Lab Results   Component Value Date    PH 7.277 2019    PCO2 58.1 2019    PO2 109.4 2019    HCO3 26.5 2019    BE -0.9 2019    THB 10.6 2019    O2SAT 97.4 2019     Labs and Imaging Studies   Basic Labs  CBC:   Lab Results   Component Value Date    WBC 7.1 2019    RBC 4.25 2019    HGB 12.1 2019    HCT 40.8 2019    MCV 96.0 2019    RDW 17.7 2019     2019     CMP:  Lab Results   Component Value Date     2019    K 4.47 2019    K 3.5 2018     2019    CO2 35 2019    BUN 50 2019    PROT 7.4 2019     Imaging Studies:     Xr Chest Portable    Result Date: 3/22/2019  Patient MRN: [id number] : 1934 Age:  80 years Gender: Female Order Date: 3/22/2019 10:30 PM Exam: XR CHEST PORTABLE Number of Images: 1 view Indication:   fever cough fever cough Comparison: Prior study from 2019 is available Findings: Study demonstrate the cardiac silhouette to be within the limits of normal. The lung fields demonstrate infiltrate seen in the right lower lung with small right-sided pleural effusion. The remaining lung fields are clear. There is prominence of interstitium suggesting of interstitial lung disease. There is a right-sided pacemaker with lead in right ventricle. There is uncoiling atherosclerotic change of thoracic aorta. The bony thorax demonstrate osteopenia. Infiltrate seen in the right lower lung with small right-sided pleural effusion       EKG: sinus tachycardia, unchanged from previous tracings. Resident's Assessment and Plan     Jo Borrero is a 80 y.o. female    Acute hypoxic hypercapnic respiratory failure  - likely 2/2 COPD exacerbation from viral PNA  - initial ABGs: 7.206/81/149.8/31.4  - patient with hx of COPD on home oxygen, with SOB and fever  - CXR: resovling R LL atelectasis  - Respiratory viral panel positive for metapneumo  - Currently intubated and mechanically ventilated due to continued respiratory distress  - Hx of pseudomonas infection June 2018. Was started on IV vancomycin and Cefepime in the ED  - Follow resp culture, procalcitonin and deescalate as needed  - Solumedrol 40mg q8h  - breathing treatment: duoneb,budesonide and perforomist  - Daily CXR and ABGs  - monitor resp status  - IVF 75cc/hr - N/S    Elevated troponin  - troponin 0.04  - EKG: Sinus tachycardia  - r/o ACS - trend troponin q6h, repeat EKG    Hypernatremia  - Na - 149  - Free water deficit: 1.75 - correct in 24 hours. - monitor BMP    Supratherapeutic INR  - INR 3.6  - on warfarin 4mg   - resume warfarin 3/23 at night, follow daily INR    Afib   - on OAC - warfarin  - on 50mg lopressor bid.  Will resume at 25mg bid  - monitor HR    CAD  - Hx of PCI to RCA  - hx of complete HB s/p pacemaker insertion  - stable    GERD  - on protonix    HLD  - on lipitor    Hypothyroidism  - on synthroid    PT/OT evaluation:Not yet ordered  DVT prophylaxis/ GI prophylaxis: Lovenox/protonix  Disposition: MICU    Carlie Burger MD, PGY-1   Attending physician: Dr. Nelson Polo personally saw, examined and provided care for the patient. Radiographs, labs and medication list were reviewed by me independently. I spoke with bedside nursing, therapists and consultants. Critical care services and times documented are independent of procedures and multidisciplinary rounds with Residents. Additionally comprehensive, multidisciplinary rounds were conducted with the MICU team. The case was discussed in detail and plans for care were established. Review of Residents documentation was conducted and revisions were made as appropriate. I agree with the above documented exam, problem list and plan of care. Intubated  COPD exacerbation   Has therapeutic INR  HMP virus+  On IV abx and steroids  Adjust vent   foillow CXR  Liberate from MV      Care reviewed with nursing staff, medical and surgical specialty care, primary care and the patient's family as available. \    Chart review/lab review/X-ray viewing/documentation and had long Conversation with patient/family re: prognosis, care options and any end of life issues:      Critical care time spent reviewing labs/films, examining patient, collaborating with other physicians more than 32 Minutes  excluding procedures . Brittany Gomes M.D.   3/23/2019  11:42 PM      Irma Torres

## 2019-03-23 NOTE — ED PROVIDER NOTES
Department of Emergency Medicine   ED  Provider Note  Admit Date/RoomTime: 3/22/2019 10:19 PM  [facility]      History of Present Illness:  3/22/19, Time: 10:25 PM       Steffany Lamas is a 80 y.o. female presenting to the ED for fever and shortness of breath, beginning today. The complaint has been persistent, moderate in severity, and worsened by nothing. Pt arrived via EMS from [facility]. Pt reports that she has been having difficulties breathing and coughing today where she is normally on 2.5 liters O2 at home. EMS notes that her temp was 99 on scene and she was given 2 Tylenol at [facility]. EMS reports that she had a CXR that shows pneumonia. Hx of A-fib and is on coumadin. Pt was just started on rocephin and doxycycline. Patient reports no chest pain,  abdominal pain, nausea, vomiting or diarrhea. Patient reports no chills, congestion or any other further symptoms at this time. Review of Systems:   Pertinent positives and negatives are stated within HPI, all other systems reviewed and are negative.    --------------------------------------------- PAST HISTORY ---------------------------------------------  Past Medical History:  has a past medical history of Acute MI, inferior wall ([address] CAD (coronary artery disease), Cardiogenic shock ([address] CHB (complete heart block) ([facility] Dermatitis, Hypercholesterolemia, Hyperlipidemia, Hypertension, Hypothyroidism, Paroxysmal atrial fibrillation ([address] Pneumonia, Shingles, Thyroid disease, and Warfarin-induced coagulopathy ([facility] Past Surgical History:  has a past surgical history that includes angioplasty (2004); Appendectomy; Diagnostic Cardiac Cath Lab Procedure (1/9/2004); Diagnostic Cardiac Cath Lab Procedure (1/12/2--4); Coronary angioplasty with stent (1/2009); thrombectomy; Hemorrhoid surgery; Upper gastrointestinal endoscopy (8/2004);  Total hip arthroplasty (7/30/2005 Right.); Cardiac pacemaker placement (Left, 01/23/2004); ECHO Compl W Dop Color Flow (5/2/2013); and Colonoscopy (04/06/2011). Social History:  reports that she quit smoking about 14 years ago. Her smoking use included cigarettes. She started smoking about 67 years ago. She has a 40.00 pack-year smoking history. She has never used smokeless tobacco. She reports that she does not drink alcohol or use drugs. Family History: family history includes High Blood Pressure in her mother; Stroke in her mother. The patients home medications have been reviewed. Allergies: Patient has no known allergies. ---------------------------------------------------PHYSICAL EXAM--------------------------------------    Constitutional/General: Awake, Alert, and oriented x3, mild distress  Head: Normocephalic and atraumatic  Eyes: PERRL, EOMI,  Mouth: Oropharynx clear, handling secretions, no trismus  Neck: Supple, full ROM  Pulmonary: Lungs sounds diminished to auscultation bilaterally, no wheezes, rales, or rhonchi. Not in respiratory distress  Cardiovascular:  Regular rate. Regular rhythm. No murmurs, gallops, or rubs. 2+ distal pulses  Chest: No chest wall tenderness  Abdomen: Soft. Non tender. Non distended. +BS. No rebound, guarding, or rigidity. No pulsatile masses appreciated. Musculoskeletal: Moves all extremities x 4. Warm and well perfused, no clubbing, cyanosis, or edema. Capillary refill <3 seconds  Skin: warm and dry. No rashes. Neurologic: GCS 15, CN II-XII grossly intact, no focal deficits,  Psych: Normal Affect    -------------------------------------------------- RESULTS -------------------------------------------------  I have personally reviewed all laboratory and imaging results for this patient. Results are listed below.      LABS:  Results for orders placed or performed during the hospital encounter of 03/22/19   Rapid influenza A/B antigens   Result Value Ref Range    Influenza A by PCR Not Detected Not Detected    Influenza B by PCR Not Detected Not Detected   CBC   Result Value Ref Range    WBC 7.1 4.5 - 11.5 E9/L    RBC 4.25 3.50 - 5.50 E12/L    Hemoglobin 12.1 11.5 - 15.5 g/dL    Hematocrit 40.8 34.0 - 48.0 %    MCV 96.0 80.0 - 99.9 fL    MCH 28.5 26.0 - 35.0 pg    MCHC 29.7 (L) 32.0 - 34.5 %    RDW 17.7 (H) 11.5 - 15.0 fL    Platelets 217 571 - 876 E9/L    MPV 10.2 7.0 - 12.0 fL   Comprehensive Metabolic Panel   Result Value Ref Range    Sodium 149 (H) 132 - 146 mmol/L    Potassium 4.5 3.5 - 5.0 mmol/L    Chloride 103 98 - 107 mmol/L    CO2 35 (H) 22 - 29 mmol/L    Anion Gap 11 7 - 16 mmol/L    Glucose 131 (H) 74 - 99 mg/dL    BUN 50 (H) 8 - 23 mg/dL    CREATININE 1.0 0.5 - 1.0 mg/dL    GFR Non-African American 53 >=60 mL/min/1.73    GFR African American >60     Calcium 10.3 (H) 8.6 - 10.2 mg/dL    Total Protein 7.4 6.4 - 8.3 g/dL    Alb 3.5 3.5 - 5.2 g/dL    Total Bilirubin 0.2 0.0 - 1.2 mg/dL    Alkaline Phosphatase 73 35 - 104 U/L    ALT 21 0 - 32 U/L    AST 33 (H) 0 - 31 U/L   Troponin   Result Value Ref Range    Troponin 0.04 (H) 0.00 - 0.03 ng/mL   Lactic Acid, Plasma   Result Value Ref Range    Lactic Acid 0.7 0.5 - 2.2 mmol/L   Urinalysis   Result Value Ref Range    Color, UA Yellow Straw/Yellow    Clarity, UA Clear Clear    Glucose, Ur Negative Negative mg/dL    Bilirubin Urine Negative Negative    Ketones, Urine Negative Negative mg/dL    Specific Gravity, UA 1.025 1.005 - 1.030    Blood, Urine Negative Negative    pH, UA 5.5 5.0 - 9.0    Protein, UA 30 (A) Negative mg/dL    Urobilinogen, Urine 0.2 <2.0 E.U./dL    Nitrite, Urine Negative Negative    Leukocyte Esterase, Urine Negative Negative   Protime-INR   Result Value Ref Range    Protime 40.4 (H) 9.3 - 12.4 sec    INR 3.6    Microscopic Urinalysis   Result Value Ref Range    Casts RARE /LPF    WBC, UA NONE 0 - 5 /HPF    RBC, UA 1-3 0 - 2 /HPF    Epi Cells RARE /HPF    Bacteria, UA NONE /HPF   Blood Gas, Arterial   Result Value Ref Range    Date Analyzed [id number]     Time Analyzed 0309     Source: Blood Arterial prognosis. Questions are answered at this time and they are agreeable with the plan.     --------------------------------- IMPRESSION AND DISPOSITION ---------------------------------    IMPRESSION  1. Pneumonia due to organism    2. Acute respiratory failure, unspecified whether with hypoxia or hypercapnia [facility]        DISPOSITION  Disposition: Admit to ICU  Patient condition is critical    3/22/19, 10:25 PM.    This note is prepared by Lilian Seals, acting as Scribe for Luigi Fuentes MD.    Luigi Fuentes MD: The scribe's documentation has been prepared under my direction and personally reviewed by me in its entirety. I confirm that the note above accurately reflects all work, treatment, procedures, and medical decision making performed by me. NOTE: This report was transcribed using voice recognition software. Every effort was made to ensure accuracy; however, inadvertent computerized transcription errors may be present.           Luigi Fuentes MD  03/22/19 Rajat Brock MD  03/23/19 [address], MD  03/23/19 5840

## 2019-03-23 NOTE — PROGRESS NOTES
03/23/19 0515   Vent Information   $Ventilation $Initial Day   Ventilator Started Yes   Ventilation Day(s) 1   Vent Type 980   Vent Mode AC/VC   Vt Ordered 380 mL   Rate Set 16 bmp   Peak Flow 60 L/min   Pressure Support 0 cmH20   FiO2  50 %   Sensitivity 3   PEEP/CPAP 5   I Time/ I Time % 0 s   Patient placed on 980 from ER HT70

## 2019-03-23 NOTE — ED NOTES
Intubation Procedure Note    Indication: impending respiratory failure    Consent: The patient provided verbal consent for this procedure. Medications Used: etomidate intravenously    Procedure: The patient was placed in the appropriate position. Cricoid pressure was not required. Intubation was performed by indirect laryngoscopy using a glidescope and a 7.0 cuffed endotracheal tube. The cuff was then inflated and the tube was secured appropriately at a distance of 21 cm to the dental ridge. Initial confirmation of placement included bilateral breath sounds, an end tidal CO2 detector and tube fogging. A chest x-ray to verify correct placement of the tube has been ordered but is still pending. The patient tolerated the procedure well. Complications: None         Vicky Santamaria DO  Resident  03/23/19 5945    ATTENDING PROVIDER ATTESTATION:     I have personally performed and/or participated in the history, exam, medical decision making, and procedures and agree with all pertinent clinical information. I have also reviewed and agree with the past medical, family and social history unless otherwise noted. I have discussed this patient in detail with the resident, and provided the instruction and education regarding Respiratory failure.     My findings/Plan: Patient was intubated under my supervision by resident  Please see separate note patient  Decompensated in the emergency department  And was admitted for pneumonia       Miguel Sellers MD  03/23/19 5168

## 2019-03-23 NOTE — PATIENT CARE CONFERENCE
P Quality Flow/Interdisciplinary Rounds Progress Note        Quality Flow Rounds held on March 23, 2019    Disciplines Attending:  ICU rounding team, resp therapy, bedside nurse, charge nurse    Chester Santos was admitted on 3/22/2019 10:19 PM    Anticipated Discharge Date:       Disposition:    David Score:  David Scale Score: 13    Readmission Risk              Risk of Unplanned Readmission:        26           Discussed patient goal for the day, patient clinical progression, and barriers to discharge. The following Goal(s) of the Day/Commitment(s) have been identified:  Wean vent as tolerated.       Katie Holguin  March 23, 2019

## 2019-03-23 NOTE — ED NOTES
Verbal order for 1L NSS wide open per Dr. Dave Vidales bp), started by LUCAS Porter, RN     Renee Szymanski RN  03/23/19 1238

## 2019-03-23 NOTE — CONSULTS
Blood Pressure Mother     Stroke Mother        REVIEW OF SYSTEMS:    Review of systems not obtained due to patient factors - intubation    PHYSICAL EXAM:      Vitals:    BP (!) 109/53   Pulse 102   Temp 97.6 °F (36.4 °C) (Axillary)   Resp 16   Ht 5' (1.524 m)   Wt 100 lb (45.4 kg)   SpO2 100%   BMI 19.53 kg/m²     EYES:  Lids and lashes normal, pupils equal, round and reactive to light, extra ocular muscles intact, sclera clear, conjunctiva normal  ENT:  Normocephalic, without obvious abnormality, atraumatic, sinuses nontender on palpation, external ears without lesions, oral pharynx with moist mucus membranes, tonsils without erythema or exudates, gums normal and good dentition. NECK:  Supple, symmetrical, trachea midline, no adenopathy, thyroid symmetric, not enlarged and no tenderness, skin normal  LUNGS:  Bilateral ronchi with cough  CARDIOVASCULAR:  Normal apical impulse, regular rate and rhythm, normal S1 and S2, no S3 or S4, and no murmur noted  ABDOMEN:  No scars, normal bowel sounds, soft, non-distended, non-tender, no masses palpated, no hepatosplenomegally  MUSCULOSKELETAL:  There is no redness, warmth, or swelling of the joints. Full range of motion noted. Motor strength is 5 out of 5 all extremities bilaterally.   Tone is normal.  NEUROLOGIC:  Awake, occasionally looking at me, unclear if responding to questions, non-purposeful reaction to stimulus    DATA:    CBC:   Recent Labs     03/22/19 2335 03/23/19  0755   WBC 7.1 7.0   HGB 12.1 9.8*   HCT 40.8 33.3*   MCV 96.0 96.0    184       BMP:  Recent Labs     03/22/19  2335 03/23/19  0309 03/23/19  0755   *  --  147*   K 4.5 4.47 3.8     --  110*   CO2 35*  --  25   BUN 50*  --  47*   CREATININE 1.0  --  1.0    ALB:3,BILIDIR:3,BILITOT:3,ALKPHOS:3)@    PT/INR:   Recent Labs     03/22/19 2335   PROTIME 40.4*   INR 3.6       ABG:   Recent Labs     03/23/19  0419 03/23/19  0804   PH 7.277* 7.409   PO2 109.4* 175.4*   PCO2 58.1* 38.5   HCO3 26.5* 23.8   BE -0.9 -0.7   O2SAT 97.4 99.1*   METHB 0.4 0.6   O2HB 96.7 98.0*   COHB 0.3 0.5   O2CON 14.6  --    HHB 2.6 0.9   THB 10.6* 11.0*     FiO2 : 50 %  I:E Ratio: 1:3.60    Radiology Review:  CXR reviewed with improved aeration right lower lung field, right hilar fullness, small right pleural effusion    IMPRESSION/RECOMMENDATIONS:      Acute hypoxic and hypercarbic respiratory failure on vent  afib  Hx of subdural hematoma  Viral syndrome (HMpV (+))      1. Cont with current vent settings, start PSV soon  2. Cont with antibiotics  3. Cont with nebs  4. Cont with steroids  5. Watch fluid balance, diurese as needed  6. Other issues as per MICU team          Thanks for letting us see this patient in consultation. Please contact us with any questions. Office [phone] or after hours through frents, x [phone].

## 2019-03-24 NOTE — PROGRESS NOTES
Pharmacy Consultation Note  (Warfarin Dosing and Monitoring)    Initial consult date: 3/24  Consulting physician: Mary Carmen Melton    Allergies:  Patient has no known allergies. Ht Readings from Last 1 Encounters:   03/23/19 5' (1.524 m)     Wt Readings from Last 1 Encounters:   03/24/19 99 lb 9.6 oz (45.2 kg)         Warfarin Indication Target   INR Range Home Dose  (if applicable)   Diet/Feeding Tube   AFib 2-3 4 mg daily   Continuous tube feeds       x Home Med? Meds Increasing INR x Home Med?  Meds Decreasing INR     Amiodarone/Propafenone   Carbamazepine   X  Antibiotics    Cholestyramine     Ciprofloxacin X  Enteral Feedings     Clarithromycin/Erythromycin   Phenobarbital     Fluconazole/Itraconazole/Voriconazole   Phenytoin (Variable)   X  Levothyroxine   Rifampin     Metronidazole   Sucralafate     Phenytoin (Variable)   Vitamin K (including food intake)   X Y Statins/Fenofibrate   Other:_____________________     Sulfamethoxazole/Trimethoprim        Other:        Comments regarding medication interactions:    x Diseases Affecting INR x Increased Bleeding Risk    CHF Exacerbation (Increases)  History GI Bleed/PUD    Liver Disease (Increases)  Chronic NSAID Use   X Thyroid: Hyper (Increases)  Hypo (Decreases)  Chronic ASA/Plavix    Malignancy  Renal Insufficiency/ESRD/HD    History of EtOH Abuse: Acute (Increases)   Chronic (Decreases)  Other:___________________       Vitamin K or Blood product  Administration Date                    TSH:    Lab Results   Component Value Date    TSH 4.620 06/15/2018        Hepatic Function Panel:                            Lab Results   Component Value Date    ALKPHOS 59 03/24/2019    ALT 25 03/24/2019    AST 45 03/24/2019    PROT 5.8 03/24/2019    BILITOT 0.3 03/24/2019    BILIDIR <0.2 09/19/2014    IBILI 0.5 09/19/2014    LABALBU 2.9 03/24/2019    LABALBU 4.5 03/09/2012       Date Warfarin Dose INR Heparin or LMWH HBG/HCT PLT Comment   3/24 -- 3.4 (3/23) -- 9.2/30.2 182    3/25

## 2019-03-24 NOTE — PLAN OF CARE
Problem: Restraint Use - Nonviolent/Non-Self-Destructive Behavior:  Goal: Absence of restraint indications  Description  Absence of restraint indications  3/24/2019 0529 by Erinn Bar RN  Outcome: Not Met This Shift    Problem: Restraint Use - Nonviolent/Non-Self-Destructive Behavior:  Goal: Absence of restraint-related injury  Description  Absence of restraint-related injury  3/24/2019 0529 by Erinn Bar RN  Outcome: Met This Shift

## 2019-03-24 NOTE — PROGRESS NOTES
[facility]  Department of Internal Medicine   Internal Medicine Residency   MICU Progress Note    Patient:  Cat Drew 80 y.o. female  MRN: [id number]     Date of Service: 3/24/2019    Allergy: Patient has no known allergies. CC follow up resp failure   Subjective     Patient was seen and examined this morning   She was intubated and sedated with fentanyl. She was not able to tolerate PS and back to [facility], will try wean down sedation and possible extubate tomorrow  R IJ CVC placed  On vanc and cefepime  Positive MRSA nasal colonization     ROS: could not obtain   Objective     VS: BP (!) 157/124   Pulse 99   Temp 97.4 °F (36.3 °C) (Temporal)   Resp (!) 32   Ht 5' (1.524 m)   Wt 99 lb 9.6 oz (45.2 kg)   SpO2 99%   BMI 19.45 kg/m²           I & O - 24hr:     Intake/Output Summary (Last 24 hours) at 3/24/2019 1025  Last data filed at 3/24/2019 8283  Gross per 24 hour   Intake 3188 ml   Output 915 ml   Net 2273 ml       Physical Exam:  · General Appearance: intubated and sedated, following commands  · Neck: no adenopathy, no carotid bruit, no JVD, supple, symmetrical, trachea midline and thyroid not enlarged, symmetric, no tenderness/mass/nodules  · Lung: clear to auscultation bilaterally  · Heart: regular rate and rhythm, S1, S2 normal, no murmur, click, rub or gallop  · Abdomen: soft, non-tender; bowel sounds normal; no masses,  no organomegaly  · Extremities:  extremities normal, atraumatic, no cyanosis or edema  · Musculoskeletal: No joint swelling, no muscle tenderness. ROM normal in all joints of extremities.    · Neurologic: intubated and sedated,     Lines     site day    Art line   None    TLC R IJ 03/24   PICC None    Hemoaccess None       Mechanical Ventilation:   Mode: A/C SIMV  PS  low tidal   TV:380 ml RR: 16  PEEP 5 cmH2O PS  FiO2 40%    ABG:     Lab Results   Component Value Date    PH 7.392 03/24/2019    PCO2 37.4 03/24/2019    PO2 123.6 03/24/2019    HCO3 22.2 03/24/2019 BE -2.3 03/24/2019    THB 10.4 03/24/2019    O2SAT 98.3 03/24/2019        Medications       ATB:   Antibiotics  Days   vanc     cefepime          Skin issues:   Patient currently has   Urinary cath  Isolation  Restraints  DVT prophylaxis/ GI prophylaxis,    PT/OT  SNF/NH placement  Palliative care consult   Labs     CBC with Differential:    Lab Results   Component Value Date    WBC 8.7 03/24/2019    RBC 3.21 03/24/2019    HGB 9.2 03/24/2019    HCT 30.3 03/24/2019     03/24/2019    MCV 94.4 03/24/2019    MCH 28.7 03/24/2019    MCHC 30.4 03/24/2019    RDW 18.4 03/24/2019    NRBC 0.9 03/23/2019    SEGSPCT 73 12/02/2011    BANDSPCT 27 09/20/2014    METASPCT 6 09/20/2014    LYMPHOPCT 2.0 03/24/2019    MONOPCT 4.4 03/24/2019    BASOPCT 0.0 03/24/2019    MONOSABS 0.38 03/24/2019    LYMPHSABS 0.17 03/24/2019    EOSABS 0.00 03/24/2019    BASOSABS 0.00 03/24/2019     CMP:    Lab Results   Component Value Date     03/24/2019    K 4.1 03/24/2019    K 4.1 03/24/2019     03/24/2019    CO2 22 03/24/2019    BUN 42 03/24/2019    CREATININE 0.9 03/24/2019    GFRAA >60 03/24/2019    LABGLOM 60 03/24/2019    GLUCOSE 160 03/24/2019    GLUCOSE 95 03/09/2012    PROT 5.8 03/24/2019    LABALBU 2.9 03/24/2019    LABALBU 4.5 03/09/2012    CALCIUM 8.3 03/24/2019    BILITOT 0.3 03/24/2019    ALKPHOS 59 03/24/2019    AST 45 03/24/2019    ALT 25 03/24/2019     Magnesium:    Lab Results   Component Value Date    MG 1.6 03/24/2019     Phosphorus:    Lab Results   Component Value Date    PHOS 2.0 03/24/2019     PT/INR:    Lab Results   Component Value Date    PROTIME 38.5 03/23/2019    INR 3.4 03/23/2019     U/A:    Lab Results   Component Value Date    COLORU Yellow 03/23/2019    PROTEINU 30 03/23/2019    PHUR 5.5 03/23/2019    LABCAST RARE 03/23/2019    WBCUA NONE 03/23/2019    RBCUA 1-3 03/23/2019    BACTERIA NONE 03/23/2019    CLARITYU Clear 03/23/2019    SPECGRAV 1.025 03/23/2019    LEUKOCYTESUR Negative 03/23/2019 UROBILINOGEN 0.2 03/23/2019    BILIRUBINUR Negative 03/23/2019    BLOODU Negative 03/23/2019    GLUCOSEU Negative 03/23/2019     ABG:    Lab Results   Component Value Date    PH 7.392 03/24/2019    PCO2 37.4 03/24/2019    PO2 123.6 03/24/2019    HCO3 22.2 03/24/2019    BE -2.3 03/24/2019    O2SAT 98.3 03/24/2019       Imaging Studies:  CXR:     Impression   Cardiomegaly   Findings compatible with atherosclerotic disease of the aorta. There is a right perihilar infiltrate improved in the interval. Right   upper lobe infiltrate has improved suggesting resolving atelectasis               Resident's Assessment and Plan     Acute hypoxic hypercapnic respiratory failure  - likely 2/2 COPD exacerbation from viral PNA  - initial ABGs: 7.206/81/149.8/31.4  - Not able to tolerate PS and back to [facility], will try wean down sedation and possible extubate tomorrow  - patient with hx of COPD on home oxygen, with SOB and fever  - CXR: resovling R LL atelectasis  - Respiratory viral panel positive for metapneumo virus  - Currently intubated and mechanically ventilated due to continued respiratory distress  - Hx of pseudomonas infection June 2018. Was started on IV vancomycin and Cefepime in the ED  - Follow resp culture, procalcitonin 0.75 and deescalate as needed  - Solumedrol 40mg q8h  - breathing treatment: duoneb,budesonide and perforomist  - Daily CXR and ABGs  - monitor resp status  - IVF 75cc/hr - N/S     Elevated troponin  - troponin 0.04  - EKG: Sinus tachycardia  - r/o ACS - trend troponin q6h, repeat EKG     Hypernatremia, resolved   - Na - [phone]  - Free water deficit: 1.75 - corrected   - monitor BMP     Supratherapeutic INR  - INR 3.6-3.4  - on warfarin 4mg   - resume warfarin 3/23 at night, follow daily INR     Afib   - on OAC - warfarin  - on 50mg lopressor bid.  Will resume at 25mg bid  - monitor HR     CAD  - Hx of PCI to RCA  - hx of complete HB s/p pacemaker insertion  - stable     GERD  - on protonix     HLD  - on lipitor     Hypothyroidism  - on synthroid     PT/OT evaluation:Not yet ordered  DVT prophylaxis/ GI prophylaxis: Lovenox/protonix  Disposition: MICU        Evelia Emmanuel MD, PGY-1    Attending physician: Dr. Nelson Polo personally saw, examined and provided care for the patient. Radiographs, labs and medication list were reviewed by me independently. I spoke with bedside nursing, therapists and consultants. Critical care services and times documented are independent of procedures and multidisciplinary rounds with Residents. Additionally comprehensive, multidisciplinary rounds were conducted with the MICU team. The case was discussed in detail and plans for care were established. Review of Residents documentation was conducted and revisions were made as appropriate. I agree with the above documented exam, problem list and plan of care. Acute respiratory failure  Acute COPD exacerbation   HMP virus  P/F ratio > 300   Liberate from MV   CXR improve as she has right side UL infiltrate,hilar congestion  deescalte abc  Expect extubation next 24-48 h      Care reviewed with nursing staff, medical and surgical specialty care, primary care and the patient's family as available. Chart review/lab review/X-ray viewing/documentation and had long Conversation with patient/family re: prognosis, care options and any end of life issues:      Critical care time spent reviewing labs/films, examining patient, collaborating with other physicians more than 32Minutes  excluding procedures . Brittany Gomes M.D.   3/24/2019  10:02 PM

## 2019-03-24 NOTE — PROCEDURES
Central Line Insertion     Procedure: right internal jugular vein Triple Lumen Catheter placement. Indications: long term access    Consent: The family members were counseled regarding the procedure, its indications, risks, potential complications and alternatives, and any questions were answered. Consent was obtained to proceed. Number of sticks: 1    Number of Kits used: 1    Procedure: Time Out: Immediately prior to the procedure a \"timeout\" was called to verify the correct patient and procedure. The patient was place in the trendelenburg position and the skin over the right internal jugular vein was prepped with betadine and draped in a sterile fashion. Local anesthesia was obtained by infiltration using 1% Lidocaine without epinephrine. With Ultrasound guidance a large bore needle was used to identify the vein, dark non pulsatile blood returned. The guide wire was then inserted through the needle with minimal resistance. 2 mm nick was made in the skin beside the guidewire. Then a dilator was inserted and removed. A triple lumen catheter was then inserted into the vessel over the guide wire using the Seldinger technique to the 15 cm freddie. All ports showed good, free flowing blood return and were flushed with saline solution. The catheter was then securely fastened to the skin with sutures and covered with a bio patch and sterile dressing. A post procedure X-ray was ordered and is still pending at this time. Complications: None   The patient tolerated the procedure well. Estimated blood loss: 5 ml.     Yadira Myles MD PGY-1  3/24/2019  11:30 AM    I was present  Erma Maxwell

## 2019-03-24 NOTE — PROGRESS NOTES
Pt placed on ps of 15 to wean. Pt did not tolerate at this time vt 200s ,increased rr to mid 35s .  Pt returned to [facility]

## 2019-03-24 NOTE — PROGRESS NOTES
P Quality Flow/Interdisciplinary Rounds Progress Note        Quality Flow Rounds held on March 24, 2019    Disciplines Attending:  Bedside Nurse, Charge nurse, JONATHAN, OT, PT, , and . Brett Hawkins was admitted on 3/22/2019 10:19 PM    Anticipated Discharge Date:  Expected Discharge Date: 03/29/19    Disposition:    David Score:  David Scale Score: 15    Readmission Risk              Risk of Unplanned Readmission:        31           Discussed patient goal for the day, patient clinical progression, and barriers to discharge.   The following Goal(s) of the Day/Commitment(s) have been identified:  Wean michelle Oneill  March 24, 2019

## 2019-03-24 NOTE — PROGRESS NOTES
Subjective: The patient is awake and alert. Remains intubated   Following commands  Remains on ac vent   Objective:    /73   Pulse 105   Temp 96.6 °F (35.9 °C) (Axillary)   Resp 19   Ht 5' (1.524 m)   Wt 99 lb 9.6 oz (45.2 kg)   SpO2 97%   BMI 19.45 kg/m²     In: 1445 [I.V.:648; NG/GT:747]  Out: 949     HEENT:intubated  NCAT,  PERRLA, No JVD  Heart:  RRR, no murmurs, gallops, or rubs. Lungs:  CTA bilaterally, no wheeze, rales or rhonchi  Abd: bowel sounds present, nontender, nondistended, no masses  Extrem:  No clubbing, cyanosis, or edema     Recent Labs     03/22/19  2335 03/23/19  0755 03/24/19  0440   WBC 7.1 7.0 8.7   HGB 12.1 9.8* 9.2*   HCT 40.8 33.3* 30.3*    184 182       Recent Labs     03/23/19  0755 03/23/19  1625 03/24/19  0440   * 145 137   K 3.8 4.5 4.1  4.1   * 108* 104   CO2 25 25 22   BUN 47* 43* 42*   CREATININE 1.0 0.9 0.9   CALCIUM 8.4* 8.2* 8.3*       Assessment:    Patient Active Problem List   Diagnosis    COPD with acute exacerbation (HCC)    Hypothyroidism    Pulmonary hypertension (HCC)    Mitral regurgitation    History of ST elevation myocardial infarction (STEMI)    CHB (complete heart block) (HCC)    Paroxysmal atrial fibrillation    Mixed hyperlipidemia    Acute on chronic respiratory failure with hypoxemia (HCC)    Pleural effusion    HTN (hypertension), benign    CAD (coronary artery disease), native coronary artery    Pneumonia    PNA (pneumonia)    Respiratory failure (HCC)       Plan:    Admitted to ICU for septic shock with Acute hypercarbic hypoxemic resp failure   BS avbx therapy pending pan cultures  Pressor support as warranted   . 39 nss for hypernatremia - resolved - switch to nss ifneeded  procal elevated at .75 - abx therapy   Viral panel pos for human meta pneumovirus- supportive care   Wean to extubate per CC team   hold oac d/t supra therapeutic inr - recheck in am   Sedate if to remain intubated   Monitor for afib/ rvr given acute  illness      DVT Proph  Pt/OT   Dc planning       Jerrod Herrera MD  7:17 PM  3/24/2019

## 2019-03-24 NOTE — PLAN OF CARE
Problem: Falls - Risk of:  Goal: Will remain free from falls  Description  Will remain free from falls  Outcome: Met This Shift  Goal: Absence of physical injury  Description  Absence of physical injury  Outcome: Met This Shift     Problem: Risk for Impaired Skin Integrity  Goal: Tissue integrity - skin and mucous membranes  Description  Structural intactness and normal physiological function of skin and  mucous membranes.   Outcome: Met This Shift     Problem: Restraint Use - Nonviolent/Non-Self-Destructive Behavior:  Goal: Absence of restraint-related injury  Description  Absence of restraint-related injury  3/23/2019 2025 by Larry Ayoub RN  Outcome: Met This Shift  3/23/2019 1054 by Indu Osuna RN  Outcome: Met This Shift

## 2019-03-24 NOTE — FLOWSHEET NOTE
Awake, agitated, heart rate 168-568,  systolic, respiratory rate 30s. Fentanyl drip titrated. Bath and linen change done. Heart rate 237Y,  systolic. resp rate 74E.

## 2019-03-25 PROBLEM — J96.22 ACUTE ON CHRONIC RESPIRATORY FAILURE WITH HYPOXIA AND HYPERCAPNIA (HCC): Status: ACTIVE | Noted: 2019-01-01

## 2019-03-25 PROBLEM — J18.9 PNA (PNEUMONIA): Status: RESOLVED | Noted: 2019-01-01 | Resolved: 2019-01-01

## 2019-03-25 PROBLEM — E03.9 ACQUIRED HYPOTHYROIDISM: Chronic | Status: ACTIVE | Noted: 2019-01-01

## 2019-03-25 PROBLEM — J96.21 ACUTE ON CHRONIC RESPIRATORY FAILURE WITH HYPOXIA AND HYPERCAPNIA (HCC): Status: ACTIVE | Noted: 2019-01-01

## 2019-03-25 PROBLEM — J96.90 RESPIRATORY FAILURE (HCC): Status: RESOLVED | Noted: 2019-01-01 | Resolved: 2019-01-01

## 2019-03-25 PROBLEM — J44.9 COPD (CHRONIC OBSTRUCTIVE PULMONARY DISEASE) (HCC): Chronic | Status: ACTIVE | Noted: 2019-01-01

## 2019-03-25 PROBLEM — J96.21 ACUTE ON CHRONIC RESPIRATORY FAILURE WITH HYPOXEMIA (HCC): Status: RESOLVED | Noted: 2019-01-01 | Resolved: 2019-01-01

## 2019-03-25 PROBLEM — E44.0 MODERATE PROTEIN-CALORIE MALNUTRITION (HCC): Chronic | Status: ACTIVE | Noted: 2019-01-01

## 2019-03-25 NOTE — PLAN OF CARE
Problem: Restraint Use - Nonviolent/Non-Self-Destructive Behavior:  Goal: Absence of restraint-related injury  Description  Absence of restraint-related injury  3/25/2019 0742 by Angie Zapien RN  Outcome: Met This Shift     Problem: Restraint Use - Nonviolent/Non-Self-Destructive Behavior:  Goal: Absence of restraint indications  Description  Absence of restraint indications  3/25/2019 0742 by Angie Zapien RN  Outcome: Not Met This Shift

## 2019-03-25 NOTE — PROGRESS NOTES
[address]   Department of Internal Medicine   Internal Medicine Residency   MICU Progress Note    Patient:  Ghanshyam Jones 80 y.o. female  MRN: [id number]     Date of Service: 3/25/2019    Allergy: Patient has no known allergies. CC follow up resp failure   Subjective     Patient was seen and examined this morning   -She was intubated and sedated with fentanyl.   -She was very anxious, will switch fentanyl to precedex  -She was not able to tolerate PS and back to [facility] will try wean down sedation and possible extubate tomorrow  - ABG: respiratory acidosis with metabolic acidosis and metabolic alkalosis,  vent setting adjusted to 10/420/5/30%, decreased RR and increased TV  - switch vancomycin to zyvox with better pulmonary infiltration      ROS: could not obtain   Objective     VS: BP (!) 152/68   Pulse 112   Temp 97.2 °F (36.2 °C)   Resp (!) 32   Ht 5' (1.524 m)   Wt 103 lb 6.4 oz (46.9 kg)   SpO2 97%   BMI 20.19 kg/m²           I & O - 24hr:     Intake/Output Summary (Last 24 hours) at 3/25/2019 1128  Last data filed at 3/25/2019 0800  Gross per 24 hour   Intake 2683 ml   Output 994 ml   Net 1689 ml       Physical Exam:  · General Appearance: intubated and sedated, following commands  · Neck: no adenopathy, no carotid bruit, no JVD, supple, symmetrical, trachea midline and thyroid not enlarged, symmetric, no tenderness/mass/nodules  · Lung: clear to auscultation bilaterally  · Heart: regular rate and rhythm, S1, S2 normal, no murmur, click, rub or gallop  · Abdomen: soft, non-tender; bowel sounds normal; no masses,  no organomegaly  · Extremities:  extremities normal, atraumatic, no cyanosis or edema  · Musculoskeletal: No joint swelling, no muscle tenderness. ROM normal in all joints of extremities.    · Neurologic: intubated and sedated,     Lines     site day    Art line   None    TLC R IJ 03/24   PICC None    Hemoaccess None       Mechanical Ventilation:   Mode: A/C SIMV  PS  low tidal   TV:380 ml RR: 16  PEEP 5 cmH2O PS  FiO2 40%    ABG:     Lab Results   Component Value Date    PH 7.258 03/25/2019    PCO2 50.3 03/25/2019    PO2 81.1 03/25/2019    HCO3 22.0 03/25/2019    BE -5.2 03/25/2019    THB 11.2 03/25/2019    O2SAT 94.0 03/25/2019        Medications       ATB:   Antibiotics  Days   zyvox 03/25   cefepime 03/23         Skin issues:   Patient currently has   Urinary cath  Isolation  Restraints  DVT prophylaxis/ GI prophylaxis,    PT/OT  SNF/NH placement  Palliative care consult   Labs     CBC with Differential:    Lab Results   Component Value Date    WBC 13.2 03/25/2019    RBC 3.46 03/25/2019    HGB 9.8 03/25/2019    HCT 32.3 03/25/2019     03/25/2019    MCV 93.4 03/25/2019    MCH 28.3 03/25/2019    MCHC 30.3 03/25/2019    RDW 18.1 03/25/2019    NRBC 0.9 03/23/2019    SEGSPCT 73 12/02/2011    BANDSPCT 27 09/20/2014    METASPCT 6 09/20/2014    LYMPHOPCT 1.8 03/25/2019    MONOPCT 4.4 03/25/2019    BASOPCT 0.1 03/25/2019    MONOSABS 0.53 03/25/2019    LYMPHSABS 0.26 03/25/2019    EOSABS 0.00 03/25/2019    BASOSABS 0.00 03/25/2019     CMP:    Lab Results   Component Value Date     03/25/2019    K 4.6 03/25/2019    K 4.1 03/24/2019    CL 97 03/25/2019    CO2 24 03/25/2019    BUN 43 03/25/2019    CREATININE 1.0 03/25/2019    GFRAA >60 03/25/2019    LABGLOM 53 03/25/2019    GLUCOSE 139 03/25/2019    GLUCOSE 95 03/09/2012    PROT 6.5 03/25/2019    LABALBU 3.1 03/25/2019    LABALBU 4.5 03/09/2012    CALCIUM 8.7 03/25/2019    BILITOT 0.3 03/25/2019    ALKPHOS 81 03/25/2019    AST 58 03/25/2019    ALT 36 03/25/2019     Magnesium:    Lab Results   Component Value Date    MG 1.7 03/25/2019     Phosphorus:    Lab Results   Component Value Date    PHOS 2.9 03/25/2019     PT/INR:    Lab Results   Component Value Date    PROTIME 27.2 03/25/2019    INR 2.4 03/25/2019     U/A:    Lab Results   Component Value Date    COLORU Yellow 03/23/2019    PROTEINU 30 03/23/2019    PHUR 5.5 03/23/2019 LABCAST RARE 03/23/2019    WBCUA NONE 03/23/2019    RBCUA 1-3 03/23/2019    BACTERIA NONE 03/23/2019    CLARITYU Clear 03/23/2019    SPECGRAV 1.025 03/23/2019    LEUKOCYTESUR Negative 03/23/2019    UROBILINOGEN 0.2 03/23/2019    BILIRUBINUR Negative 03/23/2019    BLOODU Negative 03/23/2019    GLUCOSEU Negative 03/23/2019     ABG:    Lab Results   Component Value Date    PH 7.258 03/25/2019    PCO2 50.3 03/25/2019    PO2 81.1 03/25/2019    HCO3 22.0 03/25/2019    BE -5.2 03/25/2019    O2SAT 94.0 03/25/2019       Imaging Studies:  CXR:     Impression   Cardiomegaly   Findings compatible with atherosclerotic disease of the aorta. There is a right perihilar infiltrate improved in the interval. Right   upper lobe infiltrate has improved suggesting resolving atelectasis               Resident's Assessment and Plan     Acute hypoxic hypercapnic respiratory failure  - likely 2/2 COPD exacerbation from metapneumo virus   - initial ABGs: 7.206/81/149.8/31.4  - Not able to tolerate PS and back to [facility] [place], operated by [facility], will try wean down sedation and possible extubate tomorrow  - patient with hx of COPD on home oxygen, with SOB and fever  - CXR: R LL atelectasis  - Respiratory viral panel positive for metapneumo virus  - Currently intubated and mechanically ventilated due to continued respiratory distress  - Hx of pseudomonas infection June 2018.  Was started on IV vancomycin and Cefepime in the ED  - Follow resp culture, procalcitonin 0.75 and deescalate as needed  - Solumedrol 40mg q8h  - breathing treatment: duoneb,budesonide and perforomist  - Daily CXR and ABGs  - monitor resp status  - She was very anxious, will switch fentanyl to precedex   - ABG today: respiratory acidosis with metabolic acidosis and metabolic alkalosis,  vent setting adjusted to 10/420/5/30%, decreased RR and increased TV  - switch vancomycin to zyvox with better pulmonary infiltration, On zyvox and cefepime       Elevated troponin  - troponin 0.04  - EKG: Sinus

## 2019-03-25 NOTE — PROGRESS NOTES
Nutrition Assessment (Enteral Nutrition)    Type and Reason for Visit: Initial, Positive Nutrition Screen    Nutrition Summary: Pt w/ moderate malnutrition on admit AEB h/o poor po intake & fat/muscle wasting 2/2 COPD. EN support initiated. Will provide updated TF rec to meet 100% estimated needs     Nutrition Recommendations: Continue NPO, Modify current Tube Feeding    Standard with fiber @ 35 ml/hr x 23 hours holding for Synthroid+ 1 protein modular daily via feeding tube. Will provide: 805 ml tv, 966 kcals, 45 gm pro (1066 kcals & 71 gm pro w/ modular), 650 ml free water, 2150 ml total water w/ current flushes       Malnutrition Assessment:  · Malnutrition Status: Meets the criteria for moderate malnutrition  · Context: Chronic illness  · Findings of the 6 clinical characteristics of malnutrition (Minimum of 2 out of 6 clinical characteristics is required to make the diagnosis of moderate or severe Protein Calorie Malnutrition based on AND/ASPEN Guidelines):  1. Energy Intake-Less than or equal to 75% of estimated energy requirement, Greater than or equal to 3 months    2. Weight Loss-Unable to assess(d/t fluctuations ), unable to assess  3. Fat Loss-Moderate subcutaneous fat loss, Orbital  4. Muscle Loss-Moderate muscle mass loss, Temples (temporalis muscle), Clavicles (pectoralis and deltoids)  5. Fluid Accumulation-No significant fluid accumulation  6.  Strength-Not measured    Nutrition Risk Level: Moderate    Nutrition Needs:  · Estimated Daily Total Kcal: 1086-2490 (PS3B: MV 8.97, Tmax 36.8; RMR 1013)  · Estimated Daily Protein (g): 60-70 (1.3-1.5 g/kg )  · Estimated Daily Fluid (ml/day): per critical care     Nutrition Diagnosis:   · Problem:  Moderate malnutrition, In context of chronic illness  · Etiology: related to Catabolic illness     Signs and symptoms:  as evidenced by Diet history of poor intake, Moderate muscle loss, Moderate loss of subcutaneous fat    Objective Information:  · Nutrition-Focused Physical Findings: Pt intubated, agitation noted, intermittent hypotension (MAP WNL currently), +I/O's, no edema, boggy B/L heels, active BS, OGT w/ TF      · Wound Type: None     · Current Nutrition Therapies:  · Oral Diet Orders: NPO   · Tube Feeding (TF) Orders:   · Feeding Route: Orogastric  · Formula: Standard w/Fiber  · Rate (ml/hr):40 ml/hr     · Volume (ml/day): 960 ml tv   · Duration: Continuous  · Water Flushes: 250 ml q 4 hr= 1500 ml   · Current TF & Flush Orders Provides: 1152 kcals, 53 gm pro, 775 ml free water, 2275 ml total water   · Goal TF & Flush Orders Provides: at goal on pump   · Additional Calories: no propofol      · Anthropometric Measures:  · Ht: 5' (152.4 cm)   · Current Body Wt: 103 lb (46.7 kg)(3/25 actual )  · Admission Body Wt: 100 lb (45.4 kg)(3/23 first measured)  · Usual Body Wt: EMR actuals wts 89-94lb 1 year back   · Weight Change: CBW appears slightly elevated vs EMR UBW.  Noted wt fluctuations PTA    · Ideal Body Wt: 100 lb (45.4 kg), % Ideal Body 103%  · BMI Classification: BMI 18.5 - 24.9 Normal Weight    Nutrition Interventions:   Continued Inpatient Monitoring, Education not appropriate at this time, Coordination of Care(pt status d/w RN)    Nutrition Evaluation:   · Evaluation: Goals set   · Goals: Pt to tolerate TF at goal rate    · Monitoring: Nutrition Progression, TF Intake, TF Tolerance, Skin Integrity, I&O, Mental Status/Confusion, Weight, Monitor Bowel Function, Monitor Hemodynamic Status, Pertinent Labs      Electronically signed by Clifton Jerry RD, LD on 3/25/19 at 2:38 PM    Contact Number: Ext [phone]

## 2019-03-25 NOTE — PLAN OF CARE
Problem: Malnutrition  (NI-5.2)  Goal: Food and/or Nutrient Delivery  Description- Standard with fiber @ 35 ml/hr x 23 hrs + 1 protein modular   Individualized approach for food/nutrient provision.   Outcome: Met This Shift

## 2019-03-25 NOTE — PROGRESS NOTES
cyanosis or edema  Neurologic: Mental status: awake, nodding to questions, looking anxious, appropriate    Data    CBC:   Recent Labs     03/23/19  0755 03/24/19  0440 03/25/19  0445   WBC 7.0 8.7 13.2*   HGB 9.8* 9.2* 9.8*   HCT 33.3* 30.3* 32.3*   MCV 96.0 94.4 93.4    182 222       BMP:  Recent Labs     03/23/19  1625 03/24/19  0440 03/25/19  0445    137 134   K 4.5 4.1  4.1 4.6   * 104 97*   CO2 25 22 24   PHOS  --  2.0* 2.9   BUN 43* 42* 43*   CREATININE 0.9 0.9 1.0    ALB:3,BILIDIR:3,BILITOT:3,ALKPHOS:3)@    PT/INR:   Recent Labs     03/22/19  2335 03/23/19  1625 03/25/19 0445   PROTIME 40.4* 38.5* 27.2*   INR 3.6 3.4 2.4       ABG:   Recent Labs     03/23/19  0419  03/25/19  0453   PH 7.277*   < > 7.258*   PO2 109.4*   < > 81.1   PCO2 58.1*   < > 50.3*   HCO3 26.5*   < > 22.0   BE -0.9   < > -5.2*   O2SAT 97.4   < > 94.0   METHB 0.4   < > 0.4   O2HB 96.7   < > 93.1*   COHB 0.3   < > 0.6   O2CON 14.6  --   --    HHB 2.6   < > 5.9*   THB 10.6*   < > 11.2*    < > = values in this interval not displayed. FiO2 : 30 %  I:E Ratio: 1:3.20    Radiology/Other tests reviewed: CXR reviewed with patchy interstitial opacity (?) vascular markings increased right lower lung field    Assessment:     Principal Problem:    Acute on chronic respiratory failure with hypoxia and hypercapnia (HCC)  Active Problems:    Pulmonary hypertension (HCC)    Paroxysmal atrial fibrillation    Mixed hyperlipidemia    Pleural effusion    HTN (hypertension), benign    CAD (coronary artery disease), native coronary artery    Pneumonia    COPD (chronic obstructive pulmonary disease) (HCC)    Acquired hypothyroidism  Resolved Problems:    COPD with acute exacerbation (HCC)    History of ST elevation myocardial infarction (STEMI)    CHB (complete heart block) (HCC)    PNA (pneumonia)    Respiratory failure (HCC)  viral syndrome (HMpV (+))    Plan:       1.  PSV wean as tolerated, cont with precedex and hopefully will facilitate weaning, still looking quite anxious  2. Cont with nebs and steroids  3. Watch fluid balance  4. Cont with antibiotics  5. Other issues as per MICU team        Thanks for letting us see this patient in consultation. Please contact us with any questions. Office [phone] or after hours through [employer], x [phone].

## 2019-03-25 NOTE — PLAN OF CARE
Problem: Falls - Risk of:  Goal: Will remain free from falls  Description  Will remain free from falls  Outcome: Met This Shift  Goal: Absence of physical injury  Description  Absence of physical injury  Outcome: Met This Shift     Problem: Risk for Impaired Skin Integrity  Goal: Tissue integrity - skin and mucous membranes  Description  Structural intactness and normal physiological function of skin and  mucous membranes.   Outcome: Met This Shift     Problem: Restraint Use - Nonviolent/Non-Self-Destructive Behavior:  Goal: Absence of restraint-related injury  Description  Absence of restraint-related injury  3/24/2019 2040 by Tonya Mojica RN  Outcome: Met This Shift  3/24/2019 1238 by Carrie Kidd RN  Outcome: Met This Shift  3/24/2019 1237 by Carrie Kidd RN  Outcome: Met This Shift     Problem: Aspiration:  Goal: Absence of aspiration  Description  Absence of aspiration  Outcome: Met This Shift     Problem: Pain:  Goal: Pain level will decrease  Description  Pain level will decrease  Outcome: Met This Shift     Problem: Skin Integrity - Impaired:  Goal: Will show no infection signs and symptoms  Description  Will show no infection signs and symptoms  Outcome: Met This Shift  Goal: Absence of new skin breakdown  Description  Absence of new skin breakdown  Outcome: Met This Shift

## 2019-03-25 NOTE — CONSULTS
Palliative Care Department  Palliative Care Initial Consult  Provider: [address] [place] [address] Day: 4    Referring Provider:  Heather Hill MD    Reason for Consult:  [x]  Code status Discussion  [x]  Assist with goals of care  []  Psychosocial support  []  Symptom Management  []  Advanced Care Planning    Chief Complaint: Fabrizio Le is a 80 y.o. female with chief complaint of sob/pneumonia    Assessment/Plan      Active Hospital Problems    Diagnosis Date Noted    Acute on chronic respiratory failure with hypoxia and hypercapnia (HCC) [A31.53, J96.22] 03/25/2019    COPD (chronic obstructive pulmonary disease) ([facility] [J44.9] 03/25/2019    Acquired hypothyroidism [E03.9] 03/25/2019    Pneumonia [J18.9] 03/23/2019    Pleural effusion [J90] 01/11/2019    HTN (hypertension), benign [I10] 01/11/2019    CAD (coronary artery disease), native coronary artery [I25.10] 01/11/2019    Paroxysmal atrial fibrillation [I48.0]     Mixed hyperlipidemia [E78.2]     Pulmonary hypertension ([facility] [I27.20] 05/02/2013   on antibiotics  Consults include pulmonary  Management per critical care team      Palliative Care Encounter/Recommendations:      - Goals of care: continue current management and to be determined     - Code Status: full code     - Further discussions with family regarding goals of care and code status to occur         Subjective:     HPI:  Fabrizio Le is a 80 y.o. female with significant past medical history of CAD, A fib on [address] [place] [address] Road who presented from ECF with pneumonia. She ws having sob and chest x ray showed PNA. ECF started antibiotics however she did not improve. She was intubated in ED. Film array with human meta pneumovirus  and chest xray with right lung infiltrate. Subjective/Events/Discussions:  Patient intubated and sedated. No family at bedside.  Spoke with RN.     - Family Meeting:   Participants:No family meeting held today   Family meeting was held to discuss:no meeting     Past Medical History:   Diagnosis Date    Acute MI, inferior wall ([address] 1/2004    CAD (coronary artery disease)     Cardiogenic shock (HCC)     Subtotal subacute thrombosis RCA.  CHB (complete heart block) ([facility] [address] 01/2004    Transient CHB    Dermatitis     Hypercholesterolemia     Hyperlipidemia     Hypertension     Hypothyroidism     Paroxysmal atrial fibrillation ([facility] [facility]     Pneumonia 9/20/2014    Shingles     Thyroid disease     Warfarin-induced coagulopathy ([facility] [address] 9/20/2014       Past Surgical History:   Procedure Laterality Date    ANGIOPLASTY  2004    APPENDECTOMY      CARDIAC PACEMAKER PLACEMENT Left 01/23/2004    [facility] [address] Programmed VVIR. Failed attempt to insert atrial leads. Adequate thresholds could not be obtained trying two different electrodes. Therefore, the atrial lead was removed and the pulse generator atrial port was cancelled with an indiffferent short electrode block.  COLONOSCOPY  04/06/2011    CORONARY ANGIOPLASTY WITH STENT PLACEMENT  1/2009    Dilatation stent of 1/2009 - addition stents x 3.    DIAGNOSTIC CARDIAC CATH LAB PROCEDURE  1/9/2004    [facility]:  Subtotal RCA, partially successful PTCA - stent RCA.  DIAGNOSTIC CARDIAC CATH LAB PROCEDURE  1/12/2--4    [facility]: Acute total occlusion RCA stent.  ECHO COMPL W DOP COLOR FLOW  5/2/2013         HEMORRHOID SURGERY      THROMBECTOMY      [employer].  TOTAL HIP ARTHROPLASTY  7/30/2005 Right. 12/2005 Left.  UPPER GASTROINTESTINAL ENDOSCOPY  8/2004    With colonoscopy. Family History   Problem Relation Age of Onset    High Blood Pressure Mother     Stroke Mother        Social History     Socioeconomic History    Marital status:       Spouse name: None    Number of children: None    Years of education: None    Highest education level: None   Occupational History    None   Social Needs    Financial resource strain: None    Food insecurity:     Worry: None (46.9 kg)   SpO2 97%   BMI 20.19 kg/m²     Gen:   appears stated age, well nourished, in no acute distress  HEENT:  Normocephalic, conjunctiva pink, no drainage, mucosa moist  Neck:  Supple  Lungs:  decreaedbilaterally, no audible rhonchi or wheezes noted  Heart[de-identified]  RRR, no murmur, rub, or gallop noted during exam  Abd:  Soft, non tender, non distended, BS+  Ext:  Moving all extremities, no edema, pulses present  Skin:  Warm and dry  Neuro:  Intubated and sedated, responds to painful stimuli      Current Medications:  Inpatient/Home medications reviewed:    Results/Verification of Data Review  Objective data reviewed: labs, images, records, medication use, vitals and chart      - Advanced Directives: unknown   -Surrogate/Legal NOK: sister    Contacts:  Christina Pompa [phone], [facility] sister [phone],  Mary Harrison Elodia    - Spiritual assessment: No spiritual distress identified     - Bereavement and grief: to be determined    - Discharge planning: to be determined    - Prognosis: unknown    - Referrals to: none today    Time/Communication  Greater than 51% of time spent, total 35 minutes in counseling and coordination of care at the bedside regarding goals of care, diagnosis and prognosis and see above. Arthur RAMIREZ-CNP  Palliative Medicine    Discussed patient and the plan of care with the other IDT members of Palliative Med team and with floor nurse. Thank you for allowing Palliative Medicine to participate in the care of Saige Obrien. Note: This report was completed using [employer] voiced recognition software. Every effort has been made to ensure accuracy; however, inadvertent computerized transcription errors may be present.

## 2019-03-25 NOTE — PROGRESS NOTES
[facility] Quality Flow/Interdisciplinary Rounds Progress Note        Quality Flow Rounds held on March 25, 2019    Disciplines Attending:  Bedside Nurse, Charge nurse, JONATHAN, OT, PT, , and . Eber Garner was admitted on 3/22/2019 10:19 PM    Anticipated Discharge Date:  Expected Discharge Date: 03/29/19    Disposition:    David Score:  David Scale Score: 14    Readmission Risk              Risk of Unplanned Readmission:        27           Discussed patient goal for the day, patient clinical progression, and barriers to discharge. The following Goal(s) of the Day/Commitment(s) have been identified:  Change fentanyl to precedex.       Allison Clement  March 25, 2019

## 2019-03-25 NOTE — PROGRESS NOTES
Subjective: Intubated on vent support. Agitated with elevated BP overnight. Objective:    /62   Pulse 88   Temp 97.2 °F (36.2 °C)   Resp 18   Ht 5' (1.524 m)   Wt 103 lb 6.4 oz (46.9 kg)   SpO2 99%   BMI 20.19 kg/m²     Current medications that patient is taking have been reviewed. Heart:  RRR, no murmurs, gallops, or rubs.   Lungs:  Decreased breath sounds bilaterally  Abd: bowel sounds present, soft, nontender, nondistended, no masses  Extrem:  No cyanosis or edema    CBC with Differential:    Lab Results   Component Value Date    WBC 13.2 03/25/2019    RBC 3.46 03/25/2019    HGB 9.8 03/25/2019    HCT 32.3 03/25/2019     03/25/2019    MCV 93.4 03/25/2019    MCH 28.3 03/25/2019    MCHC 30.3 03/25/2019    RDW 18.1 03/25/2019    NRBC 0.9 03/23/2019    SEGSPCT 73 12/02/2011    BANDSPCT 27 09/20/2014    METASPCT 6 09/20/2014    LYMPHOPCT 1.8 03/25/2019    MONOPCT 4.4 03/25/2019    BASOPCT 0.1 03/25/2019    MONOSABS 0.53 03/25/2019    LYMPHSABS 0.26 03/25/2019    EOSABS 0.00 03/25/2019    BASOSABS 0.00 03/25/2019     CMP:    Lab Results   Component Value Date     03/25/2019    K 4.6 03/25/2019    K 4.1 03/24/2019    CL 97 03/25/2019    CO2 24 03/25/2019    BUN 43 03/25/2019    CREATININE 1.0 03/25/2019    GFRAA >60 03/25/2019    LABGLOM 53 03/25/2019    GLUCOSE 139 03/25/2019    GLUCOSE 95 03/09/2012    PROT 6.5 03/25/2019    LABALBU 3.1 03/25/2019    LABALBU 4.5 03/09/2012    CALCIUM 8.7 03/25/2019    BILITOT 0.3 03/25/2019    ALKPHOS 81 03/25/2019    AST 58 03/25/2019    ALT 36 03/25/2019     BMP:    Lab Results   Component Value Date     03/25/2019    K 4.6 03/25/2019    K 4.1 03/24/2019    CL 97 03/25/2019    CO2 24 03/25/2019    BUN 43 03/25/2019    LABALBU 3.1 03/25/2019    LABALBU 4.5 03/09/2012    CREATININE 1.0 03/25/2019    CALCIUM 8.7 03/25/2019    GFRAA >60 03/25/2019    LABGLOM 53 03/25/2019    GLUCOSE 139 03/25/2019    GLUCOSE 95 03/09/2012     Magnesium:    Lab Results   Component Value Date    MG 1.7 03/25/2019     Phosphorus:    Lab Results   Component Value Date    PHOS 2.9 03/25/2019     PT/INR:    Lab Results   Component Value Date    PROTIME 27.2 03/25/2019    INR 2.4 03/25/2019     PTT:    Lab Results   Component Value Date    APTT 45.9 09/19/2014   [APTT}  ABG:    Lab Results   Component Value Date    PH 7.258 03/25/2019    PCO2 50.3 03/25/2019    PO2 81.1 03/25/2019    HCO3 22.0 03/25/2019    BE -5.2 03/25/2019    O2SAT 94.0 03/25/2019        Assessment:    Patient Active Problem List   Diagnosis    Pulmonary hypertension ([facility] [address]    Paroxysmal atrial fibrillation    Mixed hyperlipidemia    Pleural effusion    HTN (hypertension), benign    CAD (coronary artery disease), native coronary artery    Pneumonia    Acute on chronic respiratory failure with hypoxia and hypercapnia (HCC)    COPD (chronic obstructive pulmonary disease) ([facility] [address]    Acquired hypothyroidism       Plan:  Stable. Rate controlled. INR therapeutic. Continue aggressive lipid therapy  Stable. Blood pressure ok, continue current medications  Continue medical management of ASCAD. Possible acute bacterial pneumonia on top of metapneumo virus infection. Secondary to COPD exacerbation, pneumonia and human metapneumovirus infection. On vent support. Continue breathing treatments. Continue synthroid. Continue ICU care.     Hernán Zaragoza    10:29 AM  3/25/2019

## 2019-03-25 NOTE — CARE COORDINATION
3/25  Transition of care notes  To ed from [facility] sob  pmh  Mi  Cad  Gavinjeanethanival  Pacer  Admit to icu  Intubated on vent  On fentanyl gtt  precedex gtt  Iv solu medrol  Iv maxipime  zyvox  No family present  Per [facility] from [address] [facility]  Was there skilled  Not a bed hold  Will accept back no precert when stable  Awaiting icu team for plan of care  Shivam Reno RN Case Manager

## 2019-03-25 NOTE — PLAN OF CARE
Problem: Falls - Risk of:  Goal: Will remain free from falls  Description  Will remain free from falls  3/25/2019 0216 by Kirit Monk RN  Outcome: Met This Shift    Problem: Falls - Risk of:  Goal: Absence of physical injury  Description  Absence of physical injury  3/25/2019 0216 by Kirit Monk RN  Outcome: Met This Shift    Problem: Risk for Impaired Skin Integrity  Goal: Tissue integrity - skin and mucous membranes  Description  Structural intactness and normal physiological function of skin and  mucous membranes.   3/25/2019 0216 by Kirit Monk RN  Outcome: Met This Shift    Problem: Restraint Use - Nonviolent/Non-Self-Destructive Behavior:  Goal: Absence of restraint-related injury  Description  Absence of restraint-related injury  3/25/2019 0216 by Kirit Monk RN  Outcome: Met This Shift    Problem: Anxiety/Stress:  Goal: Level of anxiety will decrease  Description  Level of anxiety will decrease  3/25/2019 0216 by Kirit Monk RN  Outcome: Met This Shift    Problem: Aspiration:  Goal: Absence of aspiration  Description  Absence of aspiration  3/25/2019 0216 by Kirit Monk RN  Outcome: Met This Shift    Problem: Gas Exchange - Impaired:  Goal: Levels of oxygenation will improve  Description  Levels of oxygenation will improve  Outcome: Met This Shift     Problem: Nutrition Deficit:  Goal: Ability to achieve adequate nutritional intake will improve  Description  Ability to achieve adequate nutritional intake will improve  Outcome: Met This Shift     Problem: Skin Integrity - Impaired:  Goal: Will show no infection signs and symptoms  Description  Will show no infection signs and symptoms  3/25/2019 0216 by Kirit Monk RN  Outcome: Met This Shift     Problem: Sleep Pattern Disturbance:  Goal: Appears well-rested  Description  Appears well-rested  Outcome: Met This Shift     Problem: Tissue Perfusion, Altered:  Goal: Circulatory function within specified parameters  Description  Circulatory function within specified parameters  Outcome: Met This Shift     Problem: Restraint Use - Nonviolent/Non-Self-Destructive Behavior:  Goal: Absence of restraint indications  Description  Absence of restraint indications  3/25/2019 0216 by Carol Oviedo RN  Outcome: Not Met This Shift

## 2019-03-25 NOTE — PROGRESS NOTES
Pharmacy Consultation Note  (Warfarin Dosing and Monitoring)    Initial consult date: 3/24  Consulting physician: Albertina Lozada    Allergies:  Patient has no known allergies. 80 y.o. female    Ht Readings from Last 1 Encounters:   03/23/19 5' (1.524 m)     Wt Readings from Last 1 Encounters:   03/25/19 103 lb 6.4 oz (46.9 kg)         Warfarin Indication Target   INR Range Home Dose  (if applicable) Diet/Feeding Tube   (Enteral feeds, nutritional drinks and increased Vitamin K in diet can decrease INR)   atrial fibrillation   2-3 4 mg daily per nursing home records-warfarin resumed around 3/18. Held since Dec 2018 d/t ICH s/p fall Continuous tube feeds       x Home Med? Meds Increasing INR x Home Med?  Meds Decreasing INR     Allopurinol    Azathioprine     Amiodarone/Propafenone/Dronedarone   Carbamazepine     Androgens   Cholestyramine     Chemotherapy (BBW: Capecitabine)   Estrogen     Ciprofloxacin/Levofloxacin   Nafcillin/Dicloxacillin     Clarithromycin/Erythromycin/Azithromycin   Barbiturates      Fluconazole/Itraconazole/Voriconazole/Ketoconazole   Phenytoin (Variable)     Metronidazole   Rifampin     Phenytoin (Variable) X  Steroids (Variable/Dose Dependent)   X X  Statins/Fenofibrate/Gemfibrozil   Sucralfate   X  Steroids (Variable/Dose Dependent)   Other:     Sulfamethoxazole/Trimethoprim        Tramadol         Other:       Comments regarding medication interactions:      x Diseases Affecting INR x Increased Bleeding Risk    CHF Exacerbation (Increases)  History GI Bleed/PUD    Liver Disease (Increases)  Chronic NSAID Use    Thyroid: Hyper (Increases)  Hypo (Decreases)  Chronic ASA/Antiplatelet Use (Clopidogrel/ Dipyridamole/Prasugrel/Ticagrelor)      Malignancy (Increases)  Abnormal Renal Function (dialysis, renal transplant, SCr ? 2.3 mg/dL)     History of EtOH Abuse: Acute (Increases)   Chronic (Decreases)  Liver Function (cirrhosis, bilirubin >2x ULN with AST/ALT/AP >3x ULN)    Fever (Increases)  Age > 65 years    Acute infection (Increases)  Hypertension/Uncontrolled BP    Diarrhea/Dehydration (Increases)  History of stroke    Other: __________________  Other:___________________     Vitamin K or Blood product  Administration Date                    TSH:    Lab Results   Component Value Date    TSH 4.620 06/15/2018        Hepatic Function Panel:                            Lab Results   Component Value Date    ALKPHOS 81 03/25/2019    ALT 36 03/25/2019    AST 58 03/25/2019    PROT 6.5 03/25/2019    BILITOT 0.3 03/25/2019    BILIDIR <0.2 09/19/2014    IBILI 0.5 09/19/2014    LABALBU 3.1 03/25/2019    LABALBU 4.5 03/09/2012       Date Warfarin Dose INR Heparin or LMWH HBG/HCT PLT Comment   3/22 Held 3.6 --- 12.1/40.8 231    3/23 Held 3.4 --- 9.8/33.3 184    3/24 Held --- --- 9.2/30.2 182    3/25 2.5 mg 2.4 --- 9.8/32.3 222                                 Assessment and Plan:  · 80year old female who presented with fever/SOB from facility and is admitted for respiratory failure/pneumonia. Currently intubated and sedated, tube feeds ordered. · Receiving cefepime and Zyvox  · On warfarin 4 mg daily for atrial fibrillation per nursing home records-warfarin resumed around 3/18. Held since Dec 2018 d/t ICH s/p fall  · Maintenance dose at [facility] (December 2018): 5 mg on Fri.; 2.5 mg all other days  · INR today=2.4; goal INR 2-3. · Warfarin 2.5 mg tonight  · Daily PT/INR until the INR is stable within the therapeutic range  · Pharmacist will follow and monitor/adjust dosing as necessary    Thank you for this consult.     Demetrius Valdez, PharmD Candidate [id number]  3/25/2019 2:04 PM

## 2019-03-25 NOTE — PROGRESS NOTES
Chart reviewed. pt seen , intubated in icu. No family present. Past medical history of cad/ multiple medical problems , atrial fib on [address]  who presents with complaints of not being able to breathe at nursing facility. She is being treated for septic shock with Acute hypercarbic hypoxemic resp failure. NOK is listed as  her sisters Solis Wren and Natividad Arias, and a niece Kathy Other. No advance directives on file. LSW will follow .

## 2019-03-26 NOTE — PLAN OF CARE
Problem: Restraint Use - Nonviolent/Non-Self-Destructive Behavior:  Goal: Absence of restraint-related injury  Description  Absence of restraint-related injury  3/26/2019 1635 by Odalys Romano RN  Outcome: Met This Shift     Problem: Restraint Use - Nonviolent/Non-Self-Destructive Behavior:  Goal: Absence of restraint indications  Description  Absence of restraint indications  3/26/2019 1635 by Odalys Romano RN  Outcome: Not Met This Shift

## 2019-03-26 NOTE — PLAN OF CARE
Problem: Falls - Risk of:  Goal: Will remain free from falls  Description  Will remain free from falls  Outcome: Met This Shift     Problem: Falls - Risk of:  Goal: Absence of physical injury  Description  Absence of physical injury  Outcome: Met This Shift     Problem: Risk for Impaired Skin Integrity  Goal: Tissue integrity - skin and mucous membranes  Description  Structural intactness and normal physiological function of skin and  mucous membranes.   Outcome: Met This Shift     Problem: Restraint Use - Nonviolent/Non-Self-Destructive Behavior:  Goal: Absence of restraint-related injury  Description  Absence of restraint-related injury  3/26/2019 4090 by Ifeoma Briscoe RN  Outcome: Met This Shift     Problem: Aspiration:  Goal: Absence of aspiration  Description  Absence of aspiration  3/26/2019 0666 by Ifeoma Briscoe RN  Outcome: Met This Shift     Problem: Gas Exchange - Impaired:  Goal: Levels of oxygenation will improve  Description  Levels of oxygenation will improve  Outcome: Met This Shift     Problem: Nutrition Deficit:  Goal: Ability to achieve adequate nutritional intake will improve  Description  Ability to achieve adequate nutritional intake will improve  Outcome: Met This Shift     Problem: Skin Integrity - Impaired:  Goal: Will show no infection signs and symptoms  Description  Will show no infection signs and symptoms  Outcome: Met This Shift     Problem: Tissue Perfusion, Altered:  Goal: Circulatory function within specified parameters  Description  Circulatory function within specified parameters  Outcome: Met This Shift     Problem: Restraint Use - Nonviolent/Non-Self-Destructive Behavior:  Goal: Absence of restraint indications  Description  Absence of restraint indications  3/25/2019 2046 by Dre Chadwick RN  Outcome: Not Met This Shift                            Reaches for ett  Problem: Anxiety/Stress:  Goal: Level of anxiety will decrease  Description  Level of anxiety will decrease  3/26/2019 0652 by Yaritza Brown RN  Outcome: Not Met This Shift     Agitated and anxious throughout the night with patient care  Problem:  Bowel Function - Altered:  Goal: Bowel elimination is within specified parameters  Description  Bowel elimination is within specified parameters  Outcome: Not Met This Shift    No BM  Problem: Fluid Volume - Imbalance:  Goal: Absence of imbalanced fluid volume signs and symptoms  Description  Absence of imbalanced fluid volume signs and symptoms  Outcome: Not Met This Shift    1+ edema  Problem: Pain:  Goal: Recognizes and communicates pain  Description  Recognizes and communicates pain  Outcome: Not Met This Shift     Problem: Sleep Pattern Disturbance:  Goal: Appears well-rested  Description  Appears well-rested  Outcome: Not Met This Shift    Awake and agitated

## 2019-03-26 NOTE — PROGRESS NOTES
Palliative medicine    No family is at bedside. Place a call to patient's sister Elizabeth Ugarte and her niece Corrinne Boston answered the phone. The phone numbers of same, [phone]. Corrinne Boston identified herself as a healthcare power of  and said she would bring the documents in to the hospital tomorrow. She is unable to come to the hospital today for meeting. She would like to meet tomorrow afternoon at approximately 1 PM to discuss goals of care for the patient.     Isabel Santiago PA-C

## 2019-03-26 NOTE — CARE COORDINATION
3/26  Transition of care notes  Remains in icu  Intubated on vent   On precedex gtt  In isolation  Iv steroids  Iv antib  Now on diprovan gtt  No family here  Plan to return to [facility] when stable  BRANDEN San RN Case Manager

## 2019-03-26 NOTE — PROGRESS NOTES
Pharmacy Consultation Note  (Warfarin Dosing and Monitoring)    Initial consult date: 3/24  Consulting physician: Melony Rodriguez    Allergies:  Patient has no known allergies. 80 y.o. female    Ht Readings from Last 1 Encounters:   03/23/19 5' (1.524 m)     Wt Readings from Last 1 Encounters:   03/26/19 105 lb 11.2 oz (47.9 kg)         Warfarin Indication Target   INR Range Home Dose  (if applicable) Diet/Feeding Tube   (Enteral feeds, nutritional drinks and increased Vitamin K in diet can decrease INR)   atrial fibrillation   2-3 4 mg daily per nursing home records-warfarin resumed around 3/18. Held since Dec 2018 d/t ICH s/p fall Continuous tube feeds       x Home Med? Meds Increasing INR x Home Med?  Meds Decreasing INR     Allopurinol    Azathioprine     Amiodarone/Propafenone/Dronedarone   Carbamazepine     Androgens   Cholestyramine     Chemotherapy (BBW: Capecitabine)   Estrogen     Ciprofloxacin/Levofloxacin   Nafcillin/Dicloxacillin     Clarithromycin/Erythromycin/Azithromycin   Barbiturates      Fluconazole/Itraconazole/Voriconazole/Ketoconazole   Phenytoin (Variable)     Metronidazole   Rifampin     Phenytoin (Variable) X  Steroids (Variable/Dose Dependent)   X X  Statins/Fenofibrate/Gemfibrozil   Sucralfate   X  Steroids (Variable/Dose Dependent)   Other:     Sulfamethoxazole/Trimethoprim        Tramadol         Other:       Comments regarding medication interactions:      x Diseases Affecting INR x Increased Bleeding Risk    CHF Exacerbation (Increases)  History GI Bleed/PUD    Liver Disease (Increases)  Chronic NSAID Use    Thyroid: Hyper (Increases)  Hypo (Decreases)  Chronic ASA/Antiplatelet Use (Clopidogrel/ Dipyridamole/Prasugrel/Ticagrelor)      Malignancy (Increases)  Abnormal Renal Function (dialysis, renal transplant, SCr ? 2.3 mg/dL)     History of EtOH Abuse: Acute (Increases)   Chronic (Decreases)  Liver Function (cirrhosis, bilirubin >2x ULN with AST/ALT/AP >3x ULN)    Fever (Increases)  Age > 65 years    Acute infection (Increases)  Hypertension/Uncontrolled BP    Diarrhea/Dehydration (Increases)  History of stroke    Other: __________________  Other:___________________     Vitamin K or Blood product  Administration Date                    TSH:    Lab Results   Component Value Date    TSH 4.620 06/15/2018        Hepatic Function Panel:                            Lab Results   Component Value Date    ALKPHOS 68 03/26/2019    ALT 33 03/26/2019    AST 36 03/26/2019    PROT 5.9 03/26/2019    BILITOT <0.2 03/26/2019    BILIDIR <0.2 09/19/2014    IBILI 0.5 09/19/2014    LABALBU 2.8 03/26/2019    LABALBU 4.5 03/09/2012       Date Warfarin Dose INR Heparin or LMWH HBG/HCT PLT Comment   3/22 Held 3.6 --- 12.1/40.8 231    3/23 Held 3.4 --- 9.8/33.3 184    3/24 Held --- --- 9.2/30.2 182    3/25 2.5 mg 2.4 --- 9.8/32.3 222    3/26 2.5 mg  1.8 -- 8.7/28.4 156                        Assessment and Plan:  · 80year old female who presented with fever/SOB from facility and is admitted for respiratory failure/pneumonia. Currently intubated and sedated, tube feeds ordered. · Receiving cefepime and Zyvox  · On warfarin 4 mg daily for atrial fibrillation per nursing home records-warfarin resumed around 3/18. Held since Dec 2018 d/t ICH s/p fall  · Maintenance dose at [facility] (December 2018): 5 mg on Fri.; 2.5 mg all other days  · INR today=1.8; goal INR 2-3. · Warfarin 2.5 mg tonight  · Daily PT/INR until the INR is stable within the therapeutic range  · Pharmacist will follow and monitor/adjust dosing as necessary    Thank you for this consult.     Yadira Kim, PharmD 3/26/2019 8:17 AM

## 2019-03-26 NOTE — PROGRESS NOTES
Results   Component Value Date    MG 1.8 03/26/2019     Phosphorus:    Lab Results   Component Value Date    PHOS 2.1 03/26/2019     PT/INR:    Lab Results   Component Value Date    PROTIME 20.3 03/26/2019    INR 1.8 03/26/2019     PTT:    Lab Results   Component Value Date    APTT 45.9 09/19/2014   [APTT}  ABG:    Lab Results   Component Value Date    PH 7.373 03/26/2019    PCO2 40.9 03/26/2019    PO2 161.4 03/26/2019    HCO3 23.3 03/26/2019    BE -1.8 03/26/2019    O2SAT 99.1 03/26/2019        Assessment:    Patient Active Problem List   Diagnosis    Pulmonary hypertension ([facility]    Paroxysmal atrial fibrillation    Mixed hyperlipidemia    Pleural effusion    HTN (hypertension), benign    CAD (coronary artery disease), native coronary artery    Pneumonia    Acute on chronic respiratory failure with hypoxia and hypercapnia (HCC)    COPD (chronic obstructive pulmonary disease) ([facility] [address] [facility]    Acquired hypothyroidism    Moderate protein-calorie malnutrition ([facility] [address]       Plan:  Stable. Rate controlled. INR therapeutic. Continue aggressive lipid therapy  Stable. Blood pressure ok, continue current medications  Continue medical management of ASCAD. Possible acute bacterial pneumonia on top of metapneumo virus infection. Secondary to COPD exacerbation, pneumonia and human metapneumovirus infection. On vent support. Continue breathing treatments. Continue synthroid. Continue ICU care.     Nga Birch    10:57 AM  3/26/2019

## 2019-03-26 NOTE — CONSULTS
REBECCA                   Full ID Consult dictated  Pt seen and examined      Nosocomial pneumonia (pneumonia present on admission)  metapneumovirus illness  CAD/Afib  COPD/smoker/recent rt sided pl effussion, transudative?etiology     Plan  - Cefepime  - d/c linezolid  - follow final resp cultures

## 2019-03-26 NOTE — PROGRESS NOTES
[facility]  [address] [facility], [address], [address] [facility]      Pulmonary Progress Note      SUBJECTIVE:  Didn't tolerate PSV weaning, on precedex and back on propofol due to agitation    OBJECTIVE    Medications    Continuous Infusions:   propofol 25 mcg/kg/min (19 1613)    dexmedetomidine (PRECEDEX) IV infusion 0.2 mcg/kg/hr (19 1612)    fentaNYL 5 mcg/ml in D5W 250 ml infusion Stopped (19 1140)       Scheduled Meds:   warfarin  2.5 mg Per G Tube Once    chlorhexidine  15 mL Mouth/Throat BID    mupirocin   Nasal BID    lidocaine PF  5 mL Intradermal Once    heparin flush  3 mL Intravenous 2 times per day    warfarin (COUMADIN) daily dosing (placeholder)   Other RX Placeholder    docusate sodium  100 mg Oral Daily    ipratropium-albuterol  3 mL Inhalation Q4H WA    atorvastatin  10 mg Oral Daily    cefepime  2 g Intravenous Q8H    metoprolol tartrate  25 mg Oral BID    levothyroxine  75 mcg Oral Daily    budesonide  1 mg Nebulization BID    formoterol  20 mcg Nebulization Q12H    methylPREDNISolone  40 mg Intravenous Q8H    pantoprazole  40 mg Intravenous Daily       PRN Meds:sodium chloride flush, heparin flush, acetaminophen    Physical    VITALS:  /60   Pulse 90   Temp 97.8 °F (36.6 °C) (Oral)   Resp (!) 41   Ht 5' (1.524 m)   Wt 105 lb 11.2 oz (47.9 kg)   SpO2 97%   BMI 20.64 kg/m²     24HR INTAKE/OUTPUT:      Intake/Output Summary (Last 24 hours) at 3/26/2019 1619  Last data filed at 3/26/2019 1400  Gross per 24 hour   Intake 3780 ml   Output 3402 ml   Net 378 ml       24HR PULSE OXIMETRY RANGE:    SpO2  Av.7 %  Min: 90 %  Max: 100 %    General appearance: appears stated age  Lungs: rhonchi bilaterally, (+) ETT  Heart: regular rate and rhythm, S1, S2 normal, no murmur, click, rub or gallop  Abdomen: soft, non-tender; bowel sounds normal; no masses,  no organomegaly  Extremities: extremities normal, atraumatic, no cyanosis or edema  Neurologic: Mental status: awake, nodding to questions, looking anxious    Data    CBC:   Recent Labs     03/24/19  0440 03/25/19  0445 03/26/19  0500   WBC 8.7 13.2* 7.0   HGB 9.2* 9.8* 8.7*   HCT 30.3* 32.3* 28.4*   MCV 94.4 93.4 91.6    222 156       BMP:  Recent Labs     03/24/19  0440 03/25/19  0445 03/26/19  0500    134 133   K 4.1  4.1 4.6 4.1    97* 99   CO2 22 24 28   PHOS 2.0* 2.9 2.1*   BUN 42* 43* 40*   CREATININE 0.9 1.0 0.9    ALB:3,BILIDIR:3,BILITOT:3,ALKPHOS:3)@    PT/INR:   Recent Labs     03/23/19  1625 03/25/19  0445 03/26/19  0500   PROTIME 38.5* 27.2* 20.3*   INR 3.4 2.4 1.8       ABG:   Recent Labs     03/26/19  0540   PH 7.373   PO2 161.4*   PCO2 40.9   HCO3 23.3   BE -1.8   O2SAT 99.1*   METHB 0.4   O2HB 98.0*   COHB 0.7   HHB 0.9   THB 9.8*     FiO2 : 30 %  I:E Ratio: 1:3.40    Radiology/Other tests reviewed: CXR reviewed with haziness both lower lung fields    Assessment:     Principal Problem:    Acute on chronic respiratory failure with hypoxia and hypercapnia (HCC)  Active Problems:    Pulmonary hypertension (HCC)    Paroxysmal atrial fibrillation    Mixed hyperlipidemia    Pleural effusion    HTN (hypertension), benign    CAD (coronary artery disease), native coronary artery    Pneumonia    COPD (chronic obstructive pulmonary disease) (HCC)    Acquired hypothyroidism    Moderate protein-calorie malnutrition (HCC)  Resolved Problems:    COPD with acute exacerbation (HCC)    History of ST elevation myocardial infarction (STEMI)    CHB (complete heart block) (HCC)    PNA (pneumonia)    Respiratory failure ([address]      Plan:       1. Cont with steroids, taper as tolerated  2. Cont with vent weaning as tolerated  3. Cont with nebs  4. Antibiotics as per ID  5. Other issues as per MICU team        Thanks for letting us see this patient in consultation. Please contact us with any questions.  Office [phone] or after hours through [employer], x [phone].

## 2019-03-26 NOTE — CONSULTS
[address]                  [address] [facility] [place],  [facility]                                  CONSULTATION    PATIENT NAME: Pa Linn                   :        1934  MED REC NO:   [id number]                            ROOM:       7522  ACCOUNT NO:   [de-identified]                           ADMIT DATE: 2019  PROVIDER:     Evaristo Petit MD    CONSULT DATE:  2019    REASON FOR CONSULTATION:  Pseudomonas pneumonia. CHIEF COMPLAINT:  Fever, weakness, shortness of breath. HISTORY OF PRESENT ILLNESS:  This is an 26-year-old female with multiple  comorbidities who presented four days ago with chief complaint of fever,  weakness, and shortness of breath. Chest x-ray was done that was  concerning for pneumonia. She was on pressors. Metapneumovirus testing  came back positive, and she was transferred to the MICU where she was  intubated started on vancomycin and cefepime. Her respiratory cultures  currently is growing oxidase positive gram negative rods and other  species of gram negative rods, MRSA nasal colonization positive, but no  Staph aureus so far isolated from the lungs. Her chest x-ray since  admission has essentially remained unchanged. Her vent setting much  improved, and she is awake and agitated. Currently, she is afebrile. Labs revealed no leukocytosis, initially procalcitonin was 0.75. Yesterday, her vancomycin has been changed to linezolid. PAST MEDICAL HISTORY:  Significant for COPD; paroxysmal AFib;  hyperlipidemia; coronary artery disease, status post PCI; complete heart  block, status post pacemaker in ; hypothyroidism; hypertension;  hyperlipidemia; history of subdural hematoma; recently admitted in  2019 for shortness of breath and developed pleural effusion which was  transudative.     PAST SURGICAL HISTORY:  Significant for angioplasty, appendectomy,  cardiac pacemaker placement in , cardiac stents, total hip  arthroplasty done in 2005 of the left side. MEDICATIONS:  Prior to admission reviewed. ALLERGIES:  No known drug allergies. SOCIAL HISTORY:  Quit smoking 14 years ago before that she had been  smoking for 67 years and has 40 pack-year history of smoking. Nonalcoholic, no illicit drug use. FAMILY HISTORY:  Significant for stroke and high blood pressure in  mother. REVIEW OF SYSTEMS:  Cannot be done as the patient is intubated. PHYSICAL EXAMINATION:  VITAL SIGNS:  Current vitals include temperature of 98.8, respiratory  rate 27, pulse rate 101, blood pressure 142/62, currently on ventilator  with FiO2 30%. GENERAL:  The patient is awake, agitated, still intubated. HEENT:  Atraumatic, normocephalic. PERRLA. EOMI. ET tube in place. RESPIRATORY:  Air entry bilaterally equal.  No wheezes or crackles. CARDIOVASCULAR:  S1 and S2 normal.  Tachycardiac. ABDOMEN:  Soft. Nontender, nondistended. Bowel sounds present. EXTREMITIES:  No pedal edema. NEUROLOGIC:  Grossly intact. LINES:  She has right IJ in place in the peripheral line. Palomo  catheter in place. LABORATORY DATA AND IMAGING:  Reviewed, mentioned in HPI. ASSESSMENT AND PLAN:  1. Nosocomial pneumonia, respiratory cultures with gram-negative rods,  one of which is oxidase positive, so far no MRSA isolated from the  lungs. 2.  Metapneumovirus viral infection. 3.  History of COPD/past history of smoking. Recent right-sided pleural  effusion transudative, unknown etiology. 4.  Coronary artery disease status post stent/AFib/supratherapeutic INR  that is normalized now. 5.  History of subdural hematoma. PLAN:  1. At this time, continue with cefepime, can discontinue linezolid. 2.  Wait for the gram negative rods to finalize both identification as  well as sensitivity. 3.  Rest of the care as per the ICU team.    Thank you for your consult. Please call for any questions.         Mary Ann Pascual MD    D: 03/26/2019 11:52:23       T: 03/26/2019 12:52:09     [email or web address]  Job#: [id number]     Doc#: [id number]    CC:

## 2019-03-26 NOTE — PROGRESS NOTES
Physical Therapy    PT order received, chart reviewed and PT evaluation attempted this date. Pt on hold per nursing for respiratory status; will re-attempt evaluation at a later time. Thank you for the opportunity to assist in the care of this patient.   Yair Begum, PT, DPT  License PT [id number]

## 2019-03-26 NOTE — PLAN OF CARE
Problem: Restraint Use - Nonviolent/Non-Self-Destructive Behavior:  Goal: Absence of restraint-related injury  Description  Absence of restraint-related injury  3/26/2019 0723 by Orion Brandon RN  Outcome: Met This Shift     Problem: Restraint Use - Nonviolent/Non-Self-Destructive Behavior:  Goal: Absence of restraint indications  Description  Absence of restraint indications  3/26/2019 0723 by Orion Brandon RN  Outcome: Not Met This Shift

## 2019-03-26 NOTE — PROGRESS NOTES
[address] [facility]  Department of Internal Medicine   Internal Medicine Residency   MICU Progress Note    Patient:  [place] 80 y.o. female  MRN: [id number]     Date of Service: 3/26/2019    Allergy: Patient has no known allergies. CC follow up resp failure   Subjective       Patient seen and examined this morning, alert followig commands, very agitated, pt is maxed on precedex gtt, wll switch to propofol. Pt not tolerating well. Will give her more time on the vent and wean as able. Pt complaining of difficulty breathing. ROS: denies headache, chest pain, abdominal pain, nausea,   Objective     VS: BP (!) 125/56   Pulse 76   Temp 97.8 °F (36.6 °C) (Oral)   Resp 18   Ht 5' (1.524 m)   Wt 105 lb 11.2 oz (47.9 kg)   SpO2 98%   BMI 20.64 kg/m²         I & O - 24hr:     Intake/Output Summary (Last 24 hours) at 3/26/2019 1242  Last data filed at 3/26/2019 1200  Gross per 24 hour   Intake 4012 ml   Output 3262 ml   Net 750 ml     Physical Exam:  · General Appearance: intubated and sedated, following commands  · Neck: no adenopathy, no carotid bruit, no JVD, supple, symmetrical, trachea midline and thyroid not enlarged, symmetric, no tenderness/mass/nodules  · Lung: diminished breath sounds bilaterally  · Heart: regular rate and rhythm, S1, S2 normal, no murmur, click, rub or gallop  · Abdomen: soft, non-tender; bowel sounds normal; no masses,  no organomegaly  · Extremities:  extremities normal, atraumatic, no cyanosis or edema  · Musculoskeletal: No joint swelling, no muscle tenderness. ROM normal in all joints of extremities.    · Neurologic: intubated and sedated,     Lines     site day    Art line   None    TLC R IJ 03/24   PICC None    Hemoaccess None       Mechanical Ventilation:   Mode: A/C    TV:380 ml RR: 16  PEEP 5 cmH2O FiO2 40%    ABG:     Lab Results   Component Value Date    PH 7.373 03/26/2019    PCO2 40.9 03/26/2019    PO2 161.4 03/26/2019    HCO3 23.3 03/26/2019    BE -1.8 UROBILINOGEN 0.2 03/23/2019    BILIRUBINUR Negative 03/23/2019    BLOODU Negative 03/23/2019    GLUCOSEU Negative 03/23/2019     ABG:    Lab Results   Component Value Date    PH 7.373 03/26/2019    PCO2 40.9 03/26/2019    PO2 161.4 03/26/2019    HCO3 23.3 03/26/2019    BE -1.8 03/26/2019    O2SAT 99.1 03/26/2019       Imaging Studies:  CXR:     Impression   Cardiomegaly   Findings compatible with atherosclerotic disease of the aorta. There is a right perihilar infiltrate improved in the interval. Right   upper lobe infiltrate has improved suggesting resolving atelectasis               Resident's Assessment and Plan     Acute hypoxic hypercapnic respiratory failure  - likely 2/2 COPD (home o2) exacerbation from metapneumo virus   - initial ABGs: 7.206/81/149.8/31.4  - pt very anxious still intubated  on [facility], will sedate her and wean tomorrow  - CXR: R LL atelectasis, not worsened from yesterday  - Respiratory viral panel positive for metapneumo virus  - Hx of pseudomonas infection June 2018.  Was started on IV vancomycin and Cefepime in the ED, vancomycin dcd, started Zyvox  - Follow resp culture, procalcitonin 0.75 and deescalate as needed  - Solumedrol 40mg q8h  - breathing treatment: duoneb,budesonide and perforomist  - Daily CXR and ABGs  - monitor resp status    Elevated troponin  - troponin 0.04  - EKG: Sinus tachycardia     Hypernatremia, resolved, pt hyponatremic, likely overcorection  - Decrease free water bolus to 100 cc q6hr  - Na - [phone]  - Free water deficit: 1.75 - corrected   - monitor BMP     Supratherapeutic INR, now subtherapeutic.   - INR 3.6-3.4-2.4  - on warfarin 4mg   - monitor daily INR     Afib, rate controlled   - on OAC - warfarin  - Lopressor 25mg bid  - monitor HR     CAD  - Hx of PCI to RCA  - hx of complete HB s/p pacemaker insertion  - stable     GERD  - on protonix     HLD  - on lipitor     Hypothyroidism  - on synthroid 75     PT/OT evaluation:Not yet ordered  DVT

## 2019-03-27 NOTE — PROGRESS NOTES
Magnesium:    Lab Results   Component Value Date    MG 1.9 03/27/2019     Phosphorus:    Lab Results   Component Value Date    PHOS 1.8 03/27/2019     PT/INR:    Lab Results   Component Value Date    PROTIME 23.1 03/27/2019    INR 2.0 03/27/2019     PTT:    Lab Results   Component Value Date    APTT 45.9 09/19/2014   [APTT}  ABG:    Lab Results   Component Value Date    PH 7.419 03/27/2019    PCO2 41.9 03/27/2019    PO2 83.9 03/27/2019    HCO3 26.5 03/27/2019    BE 1.8 03/27/2019    O2SAT 96.0 03/27/2019        Assessment:    Patient Active Problem List   Diagnosis    Pulmonary hypertension ([facility] [address]    Paroxysmal atrial fibrillation    Mixed hyperlipidemia    Pleural effusion    HTN (hypertension), benign    CAD (coronary artery disease), native coronary artery    Pneumonia    Acute on chronic respiratory failure with hypoxia and hypercapnia (HCC)    COPD (chronic obstructive pulmonary disease) ([facility] [address]    Acquired hypothyroidism    Moderate protein-calorie malnutrition ([facility] [address]       Plan:  Stable. Rate controlled. INR therapeutic. Continue aggressive lipid therapy  Stable. Blood pressure ok, continue current medications  Continue medical management of ASCAD. Possible acute bacterial pneumonia on top of metapneumo virus infection. ID on board. On cefepime  Secondary to COPD exacerbation, pneumonia and human metapneumovirus infection. On vent support. Continue breathing treatments. Continue synthroid. Continue ICU care.     Indigo Quinonez    9:23 AM  3/27/2019

## 2019-03-27 NOTE — PLAN OF CARE
Problem: Restraint Use - Nonviolent/Non-Self-Destructive Behavior:  Goal: Absence of restraint-related injury  Description  Absence of restraint-related injury  3/27/2019 1600 by Tasia Simeon RN  Outcome: Met This Shift     Problem: Restraint Use - Nonviolent/Non-Self-Destructive Behavior:  Goal: Absence of restraint indications  Description  Absence of restraint indications  3/27/2019 1600 by Tasia Simeon RN  Outcome: Not Met This Shift

## 2019-03-27 NOTE — PROGRESS NOTES
[facility] Quality Flow/Interdisciplinary Rounds Progress Note        Quality Flow Rounds held on March 27, 2019    Disciplines Attending:  Bedside Nurse, , Nursing Unit Leadership, Physical Therapy and Occupational Therapy    Chetser Santos was admitted on 3/22/2019 10:19 PM    Anticipated Discharge Date:  Expected Discharge Date: 03/29/19    Disposition:    David Score:  David Scale Score: 16    Readmission Risk              Risk of Unplanned Readmission:        28           Discussed patient goal for the day, patient clinical progression, and barriers to discharge. The following Goal(s) of the Day/Commitment(s) have been identified:  Attempt to wean ventilator and sedation.        Carvel Usk  March 27, 2019

## 2019-03-27 NOTE — PROGRESS NOTES
OCCUPATIONAL THERAPY INITIAL EVALUATION      Date:3/27/2019  Patient Name: Jaquelin Doshi  MRN: [id number]  : 1934  Room: [address] [place] [address]    Evaluating OT: Tiki Amador, OTR/L    AM-PAC Daily Activity Raw Score:   Recommended Adaptive Equipment: to be determined     Comments: Based on patient's functional performance as stated above and level of assistance needed prior to admission, this therapist believes that the patient would benefit from further skilled OT following hospital stay in an effort to increase safety, functional independence, and quality of life. Diagnosis: PNA  Pertinent Medical History: Acute MI, CAD, cardiogenic shock, complete heart block, dermatitis, hypercholesterolemia, HLD, HTN, hypothyroid, paroxysmal a-fib, PNA, shingles, thyroid disease, warfarin induced coagulopathy     Precautions:  Falls, contact and droplet isolation, vent via ETT, 2 pt restraints, OGT, spinal neutrality (L1 compression fx mid , conservative tx)     Home Living: Pt admitted from [place] [address]- Usually lives with sister and niece in a 2 story with 1st floor set up, with 2 step(s) to enter and 1 rail(s); bed/bath on main level  Bathroom setup: tub shower combo  Equipment owned: shower chair, rollator    Prior Level of Function: Assistance with ADLs and with IADLs; using ww for ambulation. Pain Level: Denies pain at rest  Cognition: A&O: 1/3 (self only); Follows 1-2 step directions roughly 50% of the time. Yes and no answers not always appropriate. Memory: P   Sequencing: P   Problem solving: P   Judgement/safety: P   RASS: +1  CAM-ICU: positive     Functional Assessment:   Initial Eval Status  Date: 3/27/19 Treatment Status  Date: Short Term Goals  Treatment frequency: 1-3x/week on MICU; PRN on stepdown unit  -pt will. ..    Feeding Dependent  OGT     improve self feeding task to independent when medically appropriate   Grooming Max A  To brush hair while seated EOB, pt dropped hair brush while attempting grooming task    improve grooming/hygiene tasks to SBA while seated EOB    UB Dressing Mod A    improve UB dressing to min A   LB Dressing Dependent    improve LB dressing to mod A with AE PRN   Bathing Max A    improve UB/LB bathing to min A   Toileting Dependent  Patient incontinent of bowel at initiation of session; rolling L & R for perineal hygiene and linen change    improve toileting task and all clothing mgmt to mod A   Bed Mobility  Supine to sit: Max A  Sit to supine: Max A x2  improve bed mobility to min A in prep for EOB ADL tasks   Functional Transfers Sit to stand: Max A x1  Stand to sit: Max A x1  improve all functional transfers to min A   Functional Mobility NT  improve functional mobility to min A with AE PRN   Balance Sitting:     Static:  Max A to dependent    Dynamic: NT  Standing: Max/Dep  demo NT dynamic sitting balance EOB during ADL tasks   Endurance/Activity Tolerance fair  demo good- activity tolerance/endurance during 15-20 min ADL task    Visual/  Perceptual Glasses: no              Hand dominance: R  UE ROM: RUE: WFL  LUE: WFL  ; crepitis noted in B shoulders  Strength: RUE: grossly 3/5 LUE: grossly 3/5   Strength: WFL  Fine Motor Coordination: WFL    Hearing: WNL  Sensation: No c/o numbness or tingling  Tone: WNL  Edema: unremarkable    Vitals:   BP at rest: 128/75 BP at end of session: 136/64   HR at rest: 97 HR at end of session: 97   Spo2 at rest: 93% on vent via ETT  Spo2 at end of session: 96% on vent via ETT     Comments/Treatment Narrative: OK from RN to see patient. Evaluation completed with PT collaboration. Upon arrival patient supine with HOB slightly elevated. Pt incontinent of bowel; rolling side to side, linen change, and perineal hygiene completed. Supine to sit. Sat EOB roughly 8 minutes- grooming task attempted. Sit <>stand with standing roughly 1 minute. Sit to supine.  Patient properly positioned using pillows and bed mechanics to semi chair to improve interaction with environment, overall functioning and decrease/prevent edema and contractures. UE ROM/MMT completed in supported sitting. After session, patient in position as stated above with all devices within reach, all lines and tubes intact, nursing in room. Pt required cues and education as noted above for safe facilitation and completion of tasks. During functional activites and ADLs pt educated on proper hand placement, safety technique, sequencing, and energy conservation techniques. Therapist provided skilled monitoring of HR, O2 saturation, blood pressure and patient's response during treatment session. Prior to and at the end of session, environmental modifications/line management completed for patients safety and efficiency of treatment session. Eval Complexity:   · High Complexity  · History: Extensive review of medical records and additional review of physical, cognitive, or psychosocial history related to current functional performance  · Exam: 5+ performance deficits  · Assistance/Modification: Max/dep assistance or modifications required to perform tasks. May have comorbidities that affect occupational performance.     Assessment of current deficits   Functional mobility [x]  ADLs [x] Strength [x]  Cognition [x]  Functional transfers  [x] IADLs [x] Safety Awareness [x]  Endurance [x]  Fine Motor Coordination [x] Balance [x] Vision/perception [] Sensation []   Gross Motor Coordination [] ROM [] Delirium [x]                  Motor Control []    Plan of Care:  ADL retraining [x]   Equipment needs [x]   Neuromuscular re-education [] Energy Conservation Techniques [x]  Functional Transfer training [x] Patient and/or Family Education [x]  Functional Mobility training [x]  Environmental Modifications [x]  Cognitive re-training []   Compensatory techniques for ADLs [x]  Splinting Needs []   Positioning to improve overall function [x]   Therapeutic Activity [x]                       Therapeutic Exercise

## 2019-03-27 NOTE — PLAN OF CARE
Problem: Falls - Risk of:  Goal: Will remain free from falls  Description  Will remain free from falls  Outcome: Met This Shift  Goal: Absence of physical injury  Description  Absence of physical injury  Outcome: Met This Shift     Problem: Risk for Impaired Skin Integrity  Goal: Tissue integrity - skin and mucous membranes  Description  Structural intactness and normal physiological function of skin and  mucous membranes.   Outcome: Met This Shift     Problem: Restraint Use - Nonviolent/Non-Self-Destructive Behavior:  Goal: Absence of restraint-related injury  Description  Absence of restraint-related injury  3/26/2019 2231 by Gretchen Acosta RN  Outcome: Met This Shift  3/26/2019 1635 by Manfred Ford RN  Outcome: Met This Shift     Problem: Anxiety/Stress:  Goal: Level of anxiety will decrease  Description  Level of anxiety will decrease  Outcome: Met This Shift     Problem: Aspiration:  Goal: Absence of aspiration  Description  Absence of aspiration  Outcome: Met This Shift     Problem: Fluid Volume - Imbalance:  Goal: Absence of imbalanced fluid volume signs and symptoms  Description  Absence of imbalanced fluid volume signs and symptoms  Outcome: Met This Shift     Problem: Nutrition Deficit:  Goal: Ability to achieve adequate nutritional intake will improve  Description  Ability to achieve adequate nutritional intake will improve  Outcome: Met This Shift

## 2019-03-27 NOTE — PLAN OF CARE
indications  Description  Absence of restraint indications  3/27/2019 0310 by Edouard Hammond RN  Outcome: Not Met This Shift     Problem: Anxiety/Stress:  Goal: Level of anxiety will decrease  Description  Level of anxiety will decrease  3/27/2019 0310 by Edouard Hammond RN  Outcome: Not Met This Shift     Problem:  Bowel Function - Altered:  Goal: Bowel elimination is within specified parameters  Description  Bowel elimination is within specified parameters  3/27/2019 0310 by Edouard Hammond RN  Outcome: Not Met This Shift     Problem: Fluid Volume - Imbalance:  Goal: Absence of imbalanced fluid volume signs and symptoms  Description  Absence of imbalanced fluid volume signs and symptoms  3/27/2019 0310 by Edouard Hammond RN  Outcome: Not Met This Shift     Problem: Pain:  Goal: Recognizes and communicates pain  Description  Recognizes and communicates pain  Outcome: Not Met This Shift     Problem: Sleep Pattern Disturbance:  Goal: Appears well-rested  Description  Appears well-rested  Outcome: Not Met This Shift

## 2019-03-27 NOTE — PROGRESS NOTES
ID Progress Note                [address] [facility] [address], [place], [address]            Phone [phone]     Fax [phone]      Chief complaint    Fever, weakness, shortness of breath  S/p intubation      Subjective:  still intubated and failed weaning trial today  The patient is awake, Afebrile. Objective:    Vitals:    03/27/19 1700   BP: 126/62   Pulse: 95   Resp: 20   Temp:    SpO2: 95%     GENERAL:  The patient is awake, agitated, still intubated. HEENT:  Atraumatic, normocephalic. PERRLA. EOMI. ET tube in place. RESPIRATORY:  Air entry bilaterally equal.  No wheezes or crackles. CARDIOVASCULAR:  S1 and S2 normal.  Tachycardiac. ABDOMEN:  Soft. Nontender, nondistended. Bowel sounds present. EXTREMITIES:  No pedal edema. NEUROLOGIC:  Grossly intact. LINES:  She has right IJ in place in the peripheral line. Palomo  catheter in place. Labs:  Recent Labs     03/25/19  0445 03/26/19  0500 03/27/19  0600   WBC 13.2* 7.0 10.1   RBC 3.46* 3.10* 3.22*   HGB 9.8* 8.7* 9.0*   HCT 32.3* 28.4* 29.0*   MCV 93.4 91.6 90.1   MCH 28.3 28.1 28.0   MCHC 30.3* 30.6* 31.0*   RDW 18.1* 17.2* 17.7*    156 228   MPV 10.2 10.9 10.4     CMP:    Lab Results   Component Value Date     03/27/2019    K 3.6 03/27/2019    K 4.1 03/24/2019    CL 99 03/27/2019    CO2 29 03/27/2019    BUN 40 03/27/2019    CREATININE 0.9 03/27/2019    GFRAA >60 03/27/2019    LABGLOM 60 03/27/2019    GLUCOSE 168 03/27/2019    GLUCOSE 95 03/09/2012    PROT 5.9 03/27/2019    LABALBU 3.0 03/27/2019    LABALBU 4.5 03/09/2012    CALCIUM 8.6 03/27/2019    BILITOT 0.3 03/27/2019    ALKPHOS 64 03/27/2019    AST 30 03/27/2019    ALT 31 03/27/2019          Microbiology :  No results for input(s): BC in the last 72 hours. No results for input(s): Bree Few in the last 72 hours. No results for input(s): LABURIN in the last 72 hours. No results for input(s): CULTRESP in the last 72 hours.   No results for input(s): WNDABS in the last 72 hours. Radiology :  noted      ASSESSMENT AND PLAN:  1. Nosocomial pneumonia, respiratory cultures with gram-negative rods,  one of which is oxidase positive, so far no MRSA isolated from the  lungs. 2.  Metapneumovirus viral infection. 3.  History of COPD/past history of smoking. Recent right-sided pleural  effusion transudative, unknown etiology. 4.  Coronary artery disease status post stent/AFib/supratherapeutic INR  that is normalized now.   5.  History of subdural hematoma.     PLAN:   continue with cefepime            Electronically signed by Juan Adams MD on 3/27/2019 at 5:17 PM

## 2019-03-27 NOTE — PROGRESS NOTES
Physical Therapy    Initial Assessment     Name: Geovany Rinaldi  : 1934  MRN: [id number]    Date of Service: 3/27/2019    Evaluating PT:  Ap Jaramillo, PT, DPT [id number]    Room #:  7354/8008-T  Diagnosis:  PNA  PMHx:  Acute MI, CAD, cardiogenic shock, complete heart block, dermatitis, hypercholesterolemia, HLD, HTN, hypothyroid, paroxysmal a-fib, PNA, shingles, thyroid disease, warfarin induced coagulopathy   Precautions:  Falls, Vent via ETT, OGT, 2 pt restraints, Contact isolation, Droplet isolation   Equipment Needs:  TBD    Pt is an unreliable historian. She was at [facility] for rehab following recent hospitalization for acute on chronic respiratory failure. Ambulating with [employer] and on 2.5 L O2. From chart:  Pt lived with sister and niece in a 2 story home with 2 stair(s) to enter and 1 rail(s). Bed is on 1 floor and bath is on 1st floor with tub shower and chair. Pt ambulated with rollator PTA. Pt independent for ADL performance. Initial Evaluation  Date: 3/27/19 Treatment Short Term/ Long Term   Goals   AM-PAC 6 Clicks 3/57     Was pt agreeable to Eval/treatment? Yes     Does pt have pain? Denies pain      Bed Mobility  Rolling: Max A  Supine to sit: Max A  Sit to supine: Max A x2  Scooting: Max A to EOB  SBA   Transfers Sit to stand: Max A  Stand to sit:  Max A  Stand pivot: NT  Min A with [employer]   Ambulation    Unable  >50 feet with [employer] Min A   Stair negotiation: ascended and descended  NT  >2 steps with 2 rail Min A   ROM BUE:  Per OT eval   BLE:  WFL     Strength BUE:  Per OT eval   BLE:  Grossly 3+/5;  Difficult command following      Balance Sitting EOB:  Mod<>Max  Static Standing:  Max<>DEP  Sitting EOB:  SBA  Dynamic Standing:  Min A with [employer]     Pt is A & O x 1  RASS:  -1 to 0  CAM-ICU:  Positive   Sensation:  Pt denies numbness and tingling to extremities  Edema:  Unremarkable     Vitals:  Blood Pressure at rest 128/75 Blood Pressure post session 136/64   Heart Rate at rest 97 bpm Heart Rate post and needs met. RNs present. Pt would benefit from continued PT services at [facility]. Pts/ family goals   1. None stated     Patient and or family understand(s) diagnosis, prognosis, and plan of care. ? PLAN  PT care will be provided in accordance with the objectives noted above. Whenever appropriate, clear delegation orders will be provided for nursing staff. Exercises and functional mobility practice will be used as well as appropriate assistive devices or modalities to obtain goals. Patient and family education will also be administered as needed. Frequency of treatments: 2-5x/week x 7-10 days.     Time in  0935  Time out  [address], [place]  [id number]

## 2019-03-27 NOTE — PROGRESS NOTES
Palliative Care Department  Palliative Care Initial Consult  [facility]    H[facility] Day: 6    Referring Provider:  Van Mckenna MD    Reason for Consult:  [x]  Code status Discussion  [x]  Assist with goals of care  []  Psychosocial support  []  Symptom Management  []  Advanced Care Planning    Chief Complaint: Leandro Carney is a 80 y.o. female with chief complaint of sob/pneumonia    Assessment/Plan      Active Hospital Problems    Diagnosis Date Noted    Acute on chronic respiratory failure with hypoxia and hypercapnia (HCC) [S76.80, J96.22] 03/25/2019    COPD (chronic obstructive pulmonary disease) ([facility] [J44.9] 03/25/2019    Acquired hypothyroidism [E03.9] 03/25/2019    Moderate protein-calorie malnutrition ([facility] [E44.0] 03/25/2019    Pneumonia [J18.9] 03/23/2019    Pleural effusion [J90] 01/11/2019    HTN (hypertension), benign [I10] 01/11/2019    CAD (coronary artery disease), native coronary artery [I25.10] 01/11/2019    Paroxysmal atrial fibrillation [I48.0]     Mixed hyperlipidemia [E78.2]     Pulmonary hypertension ([facility] [I27.20] 05/02/2013   on antibiotics  Consults include pulmonary  Management per critical care team      Palliative Care Encounter/Recommendations:      - Goals of care: continue current management and to be determined     - Code Status: limited code- no to all     - Further discussions with family/patient regarding goals of care to occur if needed        Subjective:     HPI:  3/25/19  Leandro Carney is a 80 y.o. female with significant past medical history of CAD, A fib on [address] [place] [address] Road who presented from ECF with pneumonia. She ws having sob and chest x ray showed PNA. ECF started antibiotics however she did not improve. She was intubated in ED. Film array with human meta pneumovirus  and chest xray with right lung infiltrate. Subjective/Events/Discussions:  3/27/19 Intubated, follows commands nods approprietly. Met with patient's niece.  Patient lived with her until recent hospital stay. Plan will be back to ECF. Patient would not want long term support on vent or to be reintubated once extubated. We discussed resuscitation and she wants DNR CC. Will change to limited no to all for now. DNR formed filled out an copy given to niece. Encourage her to give copy to ECF. Despite intubation, patient participated in conversation as well. 3/25/19 Patient intubated and sedated. No family at bedside. Spoke with RN.     - Family Meeting:   Participants:niece and patient   Family meeting was held to discuss:diagnosis, prognosis, treatment options, goals of care, prior expressed wishes, advanced care planning and discharge plan     ROS:   UNLESS STATED ABOVE PATIENT DENIES:  CONSTITUTIONAL:  fever, chill, rigors, nausea, vomiting, fatigue. RESPIRATORY: cough, shortness of breath, sputum expectoration. CARDIOVASCULAR:  Chest pain/pressure, palpitation, syncope, irregular beats  GASTROINTESTINAL:  abdominal or rectal pain, diarrhea, constipation, .   MUSCULOSKELETAL:  pain, edema,       Objective:     Physical Exam  BP (!) 124/58   Pulse 105   Temp 99.7 °F (37.6 °C)   Resp 30   Ht 5' (1.524 m)   Wt 104 lb 8 oz (47.4 kg)   SpO2 94%   BMI 20.41 kg/m²     Gen:   appears stated age, well nourished, in no acute distress  HEENT:  Normocephalic, conjunctiva pink, no drainage, mucosa moist  Neck:  Supple  Lungs:  CTA bilaterally, no audible rhonchi or wheezes noted  Heart[de-identified]  RRR, no murmur, rub, or gallop noted during exam  Abd:  Soft, non tender, non distended, BS+  Ext:  Moving all extremities, no edema, pulses present  Skin:  Warm and dry  Neuro:  Intubated, follows commands nods approprietly      Current Medications:  Inpatient/Home medications reviewed:    Results/Verification of Data Review  Objective data reviewed: labs, images, records, medication use, vitals and chart      - Advanced Directives: Living Will and HC-POA   -Surrogate/Legal NOK: Niece    Contacts: Mary [phone] Jenny Jones [phone], PBIQF sister [phone],  - Spiritual assessment: No spiritual distress identified     - Bereavement and grief: to be determined    - Discharge planning: to be determined    - Prognosis: unknown    - Referrals to: none today    Time/Communication  Greater than 51% of time spent, total 25 minutes in counseling and coordination of care at the bedside regarding goals of care, diagnosis and prognosis and see above. Jarred RAMIREZ-HUE  Palliative Medicine    Discussed patient and the plan of care with the other IDT members of Palliative Med team and with patient, family and floor nurse. Thank you for allowing Palliative Medicine to participate in the care of Saige Obrien. Note: This report was completed using [employer] voiced recognition software. Every effort has been made to ensure accuracy; however, inadvertent computerized transcription errors may be present.

## 2019-03-27 NOTE — PLAN OF CARE
Problem: Restraint Use - Nonviolent/Non-Self-Destructive Behavior:  Goal: Absence of restraint-related injury  Description  Absence of restraint-related injury  3/27/2019 1041 by Zakiya Michaels RN  Outcome: Met This Shift  3/27/2019 1040 by Zakiya Michaels RN  Outcome: Met This Shift  3/27/2019 0310 by Valeria Hicks RN  Outcome: Met This Shift  3/26/2019 2231 by Yulissa Kendrick RN  Outcome: Met This Shift

## 2019-03-27 NOTE — PROGRESS NOTES
[facility]  [address], [address]  [facility], [facility]      Pulmonary Progress Note      SUBJECTIVE:  Didn't tolerate PSV weaning, awake, gesturing appropriately, not looking too agitated    OBJECTIVE    Medications    Continuous Infusions:   propofol 20 mcg/kg/min (19 1311)       Scheduled Meds:   docusate sodium  100 mg Oral Daily    warfarin  2.5 mg Per G Tube Once    chlorhexidine  15 mL Mouth/Throat BID    mupirocin   Nasal BID    lidocaine PF  5 mL Intradermal Once    heparin flush  3 mL Intravenous 2 times per day    warfarin (COUMADIN) daily dosing (placeholder)   Other RX Placeholder    ipratropium-albuterol  3 mL Inhalation Q4H WA    atorvastatin  10 mg Oral Daily    cefepime  2 g Intravenous Q8H    metoprolol tartrate  25 mg Oral BID    levothyroxine  75 mcg Oral Daily    budesonide  1 mg Nebulization BID    formoterol  20 mcg Nebulization Q12H    methylPREDNISolone  40 mg Intravenous Q8H    pantoprazole  40 mg Intravenous Daily       PRN Meds:sodium chloride flush, heparin flush, acetaminophen    Physical    VITALS:  BP (!) 124/58   Pulse 105   Temp 99.7 °F (37.6 °C)   Resp 30   Ht 5' (1.524 m)   Wt 104 lb 8 oz (47.4 kg)   SpO2 94%   BMI 20.41 kg/m²     24HR INTAKE/OUTPUT:      Intake/Output Summary (Last 24 hours) at 3/27/2019 1426  Last data filed at 3/27/2019 1400  Gross per 24 hour   Intake 2965 ml   Output 2435 ml   Net 530 ml       24HR PULSE OXIMETRY RANGE:    SpO2  Av.5 %  Min: 93 %  Max: 100 %    General appearance: appears stated age  Lungs: rhonchi bilaterally, (+) ETT  Heart: regular rate and rhythm, S1, S2 normal, no murmur, click, rub or gallop  Abdomen: soft, non-tender; bowel sounds normal; no masses,  no organomegaly  Extremities: extremities normal, atraumatic, no cyanosis or edema  Neurologic: Mental status: awake, nodding to questions, looking anxious    Data    CBC:   Recent Labs     03/25/19  0445 03/26/19  0500 03/27/19  0600   WBC 13.2* 7.0 10.1   HGB 9.8* 8.7* 9.0*   HCT 32.3* 28.4* 29.0*   MCV 93.4 91.6 90.1    156 228       BMP:  Recent Labs     03/25/19  0445 03/26/19  0500 03/27/19  0600    133 139   K 4.6 4.1 3.6   CL 97* 99 99   CO2 24 28 29   PHOS 2.9 2.1* 1.8*   BUN 43* 40* 40*   CREATININE 1.0 0.9 0.9    ALB:3,BILIDIR:3,BILITOT:3,ALKPHOS:3)@    PT/INR:   Recent Labs     03/25/19  0445 03/26/19  0500 03/27/19  0600   PROTIME 27.2* 20.3* 23.1*   INR 2.4 1.8 2.0       ABG:   Recent Labs     03/27/19  0607   PH 7.419   PO2 83.9   PCO2 41.9   HCO3 26.5*   BE 1.8   O2SAT 96.0   METHB 0.4   O2HB 95.3   COHB 0.3   HHB 4.0   THB 10.4*     FiO2 : 30 %  I:E Ratio: 1:1.30    Radiology/Other tests reviewed: CXR reviewed with haziness both lower lung fields right>left looking similar to previous    Assessment:     Principal Problem:    Acute on chronic respiratory failure with hypoxia and hypercapnia (HCC)  Active Problems:    Pulmonary hypertension (HCC)    Paroxysmal atrial fibrillation    Mixed hyperlipidemia    Pleural effusion    HTN (hypertension), benign    CAD (coronary artery disease), native coronary artery    Pneumonia    COPD (chronic obstructive pulmonary disease) (HCC)    Acquired hypothyroidism    Moderate protein-calorie malnutrition (HCC)  Resolved Problems:    COPD with acute exacerbation (HCC)    History of ST elevation myocardial infarction (STEMI)    CHB (complete heart block) (HCC)    PNA (pneumonia)    Respiratory failure ([facility]      Plan:       1. Cont with steroids, taper as tolerated  2. Cont with vent weaning as tolerated, hopefully with less agitation can tolerate PSV wean further  3. Cont with nebs  4. Antibiotics as per ID  5. Other issues as per MICU team        Thanks for letting us see this patient in consultation. Please contact us with any questions. Office [phone] or after hours through [email or web address], x [phone].

## 2019-03-27 NOTE — PROGRESS NOTES
[address] [facility]  Department of Internal Medicine   Internal Medicine Residency   MICU Progress Note    Patient:  Leora Knapp 80 y.o. female  MRN: [id number]     Date of Service: 3/27/2019    Allergy: Patient has no known allergies. CC follow up resp failure   Subjective       Patient seen and examined this morning  - She was more calm today, on propofol 25,   - still intubated and failed weaning trial today, will try tomorrow   - breathing sounds improving  -   Objective     VS: /69   Pulse 88   Temp 100.4 °F (38 °C)   Resp 22   Ht 5' (1.524 m)   Wt 104 lb 8 oz (47.4 kg)   SpO2 96%   BMI 20.41 kg/m²         I & O - 24hr:     Intake/Output Summary (Last 24 hours) at 3/27/2019 0943  Last data filed at 3/27/2019 1091  Gross per 24 hour   Intake 3269 ml   Output 2872 ml   Net 397 ml     Physical Exam:  · General Appearance: intubated and sedated, following commands  · Neck: no adenopathy, no carotid bruit, no JVD, supple, symmetrical, trachea midline and thyroid not enlarged, symmetric, no tenderness/mass/nodules  · Lung: diminished breath sounds bilaterally  · Heart: regular rate and rhythm, S1, S2 normal, no murmur, click, rub or gallop  · Abdomen: soft, non-tender; bowel sounds normal; no masses,  no organomegaly  · Extremities:  extremities normal, atraumatic, no cyanosis or edema  · Musculoskeletal: No joint swelling, no muscle tenderness. ROM normal in all joints of extremities.    · Neurologic: intubated and sedated,     Lines     site day    Art line   None    TLC R IJ 03/24   PICC None    Hemoaccess None       Mechanical Ventilation:   Mode: A/C    TV:380 ml RR: 16  PEEP 5 cmH2O FiO2 40%    ABG:     Lab Results   Component Value Date    PH 7.419 03/27/2019    PCO2 41.9 03/27/2019    PO2 83.9 03/27/2019    HCO3 26.5 03/27/2019    BE 1.8 03/27/2019    THB 10.4 03/27/2019    O2SAT 96.0 03/27/2019        Medications       ATB:   Antibiotics  Days   zyvox 03/25 stopped   cefepime 03/23 Skin issues:   Patient currently has   Urinary cath  Isolation  Restraints  DVT prophylaxis/ GI prophylaxis,    PT/OT  SNF/NH placement  Palliative care consult   Labs     CBC with Differential:    Lab Results   Component Value Date    WBC 10.1 03/27/2019    RBC 3.22 03/27/2019    HGB 9.0 03/27/2019    HCT 29.0 03/27/2019     03/27/2019    MCV 90.1 03/27/2019    MCH 28.0 03/27/2019    MCHC 31.0 03/27/2019    RDW 17.7 03/27/2019    NRBC 0.9 03/27/2019    SEGSPCT 73 12/02/2011    BANDSPCT 27 09/20/2014    METASPCT 6 09/20/2014    LYMPHOPCT 0.9 03/27/2019    MONOPCT 3.5 03/27/2019    MYELOPCT 0.9 03/27/2019    BASOPCT 0.1 03/27/2019    MONOSABS 0.40 03/27/2019    LYMPHSABS 0.10 03/27/2019    EOSABS 0.00 03/27/2019    BASOSABS 0.00 03/27/2019     CMP:    Lab Results   Component Value Date     03/27/2019    K 3.6 03/27/2019    K 4.1 03/24/2019    CL 99 03/27/2019    CO2 29 03/27/2019    BUN 40 03/27/2019    CREATININE 0.9 03/27/2019    GFRAA >60 03/27/2019    LABGLOM 60 03/27/2019    GLUCOSE 168 03/27/2019    GLUCOSE 95 03/09/2012    PROT 5.9 03/27/2019    LABALBU 3.0 03/27/2019    LABALBU 4.5 03/09/2012    CALCIUM 8.6 03/27/2019    BILITOT 0.3 03/27/2019    ALKPHOS 64 03/27/2019    AST 30 03/27/2019    ALT 31 03/27/2019     Magnesium:    Lab Results   Component Value Date    MG 1.9 03/27/2019     Phosphorus:    Lab Results   Component Value Date    PHOS 1.8 03/27/2019     PT/INR:    Lab Results   Component Value Date    PROTIME 23.1 03/27/2019    INR 2.0 03/27/2019     U/A:    Lab Results   Component Value Date    COLORU Yellow 03/23/2019    PROTEINU 30 03/23/2019    PHUR 5.5 03/23/2019    LABCAST RARE 03/23/2019    WBCUA NONE 03/23/2019    RBCUA 1-3 03/23/2019    BACTERIA NONE 03/23/2019    CLARITYU Clear 03/23/2019    SPECGRAV 1.025 03/23/2019    LEUKOCYTESUR Negative 03/23/2019    UROBILINOGEN 0.2 03/23/2019    BILIRUBINUR Negative 03/23/2019    BLOODU Negative 03/23/2019    GLUCOSEU Negative 03/23/2019     ABG:    Lab Results   Component Value Date    PH 7.419 03/27/2019    PCO2 41.9 03/27/2019    PO2 83.9 03/27/2019    HCO3 26.5 03/27/2019    BE 1.8 03/27/2019    O2SAT 96.0 03/27/2019       Imaging Studies:  CXR:     Impression   Cardiomegaly   Findings compatible with atherosclerotic disease of the aorta. There is a right perihilar infiltrate improved in the interval. Right   upper lobe infiltrate has improved suggesting resolving atelectasis               Resident's Assessment and Plan     Pulmonary   Acute hypoxic hypercapnic respiratory failure  - likely 2/2 COPD (home o2) exacerbation from metapneumo virus   - initial ABGs: 7.206/81/149.8/31.4  - pt more clam today, still intubated  on AC, sedated with propofol 25, failed weaning trial, will try tomorrow   - CXR: R LL atelectasis, improving   - Respiratory viral panel positive for metapneumo virus  - Hx of pseudomonas infection June 2018. - Follow resp culture, procalcitonin 0.75 and deescalate as needed  - Solumedrol 40mg q8h  - breathing treatment: duoneb,budesonide and perforomist  - Daily CXR and ABGs,   - On cefepime  - monitor resp status    Cardiovascular   Elevated troponin  - troponin 0.04  - EKG: Sinus tachycardia     Hypernatremia, resolved,   - Decrease free water bolus to 100 cc q6hr  - Na - [phone]   - monitor BMP     HLD  - on lipitor    Hematology   Supratherapeutic INR, resolved   - now subtherapeutic.   - INR 3.6-3.4-2.4-2.0  - was on warfarin 4mg, started with 2.5 mg, pharmacy following with dosing   - monitor daily INR     GI  GERD  - on protonix     Endocrine  Hypothyroidism  - on synthroid 75     PT/OT evaluation:Not yet ordered  DVT prophylaxis/ GI prophylaxis: warfarin/ protonix  Disposition: JODIE Win MD, PGY-1    I personally saw, examined and provided care for the patient. Radiographs, labs and medication list were reviewed by me independently.  I spoke with bedside nursing, therapists and consultants. Critical care services and times documented are independent of procedures and multidisciplinary rounds with Residents. Additionally comprehensive, multidisciplinary rounds were conducted with the MICU team. The case was discussed in detail and plans for care were established. Review of Residents documentation was conducted and revisions were made as appropriate. I agree with the above documented exam, problem list and plan of care with the following additions:    She did not tolerate PSV today, although more awake and comfortable  Lungs with less wheezing today  Continue with bronchodilators, steroids  Cefepime per ID for pseudomonas - CXR improved today  Discussed with Dr. Thalia Dotson  Hopefully extubation attempt next 1-2 days    33 minutes of CCT spent with the patient, reviewing the chart including imaging studies, and discussing the case with other health care professionals.     Electronically signed by Valeria Matute MD on 3/27/2019 at 10:53 PM    Attending physician: Dr. Luis E Brewer

## 2019-03-27 NOTE — PLAN OF CARE
Problem: Restraint Use - Nonviolent/Non-Self-Destructive Behavior:  Goal: Absence of restraint-related injury  Description  Absence of restraint-related injury  3/27/2019 1632 by Piotr Yo RN  Outcome: Met This Shift  3/27/2019 1600 by Silvino Meeks RN  Outcome: Met This Shift  3/27/2019 1041 by Piotr Yo RN  Outcome: Met This Shift  3/27/2019 1040 by Piotr Yo RN  Outcome: Met This Shift  3/27/2019 0310 by Barbara Roland RN  Outcome: Met This Shift

## 2019-03-27 NOTE — PROGRESS NOTES
Pharmacy Consultation Note  (Warfarin Dosing and Monitoring)    Initial consult date: 3/24  Consulting physician: Timmy Zhong    Allergies:  Patient has no known allergies. 80 y.o. female    Ht Readings from Last 1 Encounters:   03/23/19 5' (1.524 m)     Wt Readings from Last 1 Encounters:   03/27/19 104 lb 8 oz (47.4 kg)         Warfarin Indication Target   INR [place] Dose  (if applicable) Diet/Feeding Tube   (Enteral feeds, nutritional drinks and increased Vitamin K in diet can decrease INR)   atrial fibrillation   2-3 4 mg daily per nursing home records-warfarin resumed around 3/18. Held since Dec 2018 d/t ICH s/p fall Continuous tube feeds       x Home Med? Meds Increasing INR x Home Med?  Meds Decreasing INR     Allopurinol    Azathioprine     Amiodarone/Propafenone/Dronedarone   Carbamazepine     Androgens   Cholestyramine     Chemotherapy (BBW: Capecitabine)   Estrogen     Ciprofloxacin/Levofloxacin   Nafcillin/Dicloxacillin     Clarithromycin/Erythromycin/Azithromycin   Barbiturates      Fluconazole/Itraconazole/Voriconazole/Ketoconazole   Phenytoin (Variable)     Metronidazole   Rifampin     Phenytoin (Variable) X  Steroids (Variable/Dose Dependent)   X X  Statins/Fenofibrate/Gemfibrozil   Sucralfate   X  Steroids (Variable/Dose Dependent)   Other:     Sulfamethoxazole/Trimethoprim        Tramadol         Other:       Comments regarding medication interactions:      x Diseases Affecting INR x Increased Bleeding Risk    CHF Exacerbation (Increases)  History GI Bleed/PUD    Liver Disease (Increases)  Chronic NSAID Use    Thyroid: Hyper (Increases)  Hypo (Decreases)  Chronic ASA/Antiplatelet Use (Clopidogrel/ Dipyridamole/Prasugrel/Ticagrelor)      Malignancy (Increases)  Abnormal Renal Function (dialysis, renal transplant, SCr ? 2.3 mg/dL)     History of EtOH Abuse: Acute (Increases)   Chronic (Decreases)  Liver Function (cirrhosis, bilirubin >2x ULN with AST/ALT/AP >3x ULN)    Fever (Increases)  Age >

## 2019-03-28 NOTE — PROGRESS NOTES
awake, interacting appropriately, looking slightly anxious    Data    CBC:   Recent Labs     03/26/19  0500 03/27/19  0600 03/28/19  0555   WBC 7.0 10.1 8.7   HGB 8.7* 9.0* 8.4*   HCT 28.4* 29.0* 27.0*   MCV 91.6 90.1 90.9    228 237       BMP:  Recent Labs     03/26/19  0500 03/27/19  0600 03/28/19  0555    139 142   K 4.1 3.6 4.3   CL 99 99 100   CO2 28 29 32*   PHOS 2.1* 1.8* 2.2*   BUN 40* 40* 37*   CREATININE 0.9 0.9 0.8    ALB:3,BILIDIR:3,BILITOT:3,ALKPHOS:3)@    PT/INR:   Recent Labs     03/26/19  0500 03/27/19  0600 03/28/19  0555   PROTIME 20.3* 23.1* 28.7*   INR 1.8 2.0 2.5       ABG:   Recent Labs     03/28/19  0608   PH 7.471*   PO2 72.7   PCO2 45.2*   HCO3 32.2*   BE 7.7*   O2SAT 94.7   METHB 0.4   O2HB 93.8*   COHB 0.5   HHB 5.3*   THB 9.8*     FiO2 : 30 %  I:E Ratio: 1:2.50    Radiology/Other tests reviewed: CXR reviewed with slightly increased haziness/effusion right>left lower lung field    Assessment:     Principal Problem:    Acute on chronic respiratory failure with hypoxia and hypercapnia (HCC)  Active Problems:    Pulmonary hypertension (HCC)    Paroxysmal atrial fibrillation    Mixed hyperlipidemia    Pleural effusion    HTN (hypertension), benign    CAD (coronary artery disease), native coronary artery    Pneumonia    COPD (chronic obstructive pulmonary disease) (HCC)    Acquired hypothyroidism    Moderate protein-calorie malnutrition (HCC)  Resolved Problems:    COPD with acute exacerbation (HCC)    History of ST elevation myocardial infarction (STEMI)    CHB (complete heart block) (HCC)    PNA (pneumonia)    Respiratory failure ([facility] [address]      Plan:       1. Cont with vent weaning as tolerated  2. Cont with nebs  3. Cont with steroids, taper as tolerated  4. Antibiotics as per ID  5. Other issues as per MICU team        Thanks for letting us see this patient in consultation. Please contact us with any questions. Office [phone] or after hours through [facility], x [phone].

## 2019-03-28 NOTE — PLAN OF CARE
Problem: Falls - Risk of:  Goal: Will remain free from falls  Description  Will remain free from falls  3/28/2019 0029 by Nakul Diaz RN  Outcome: Met This Shift     Problem: Falls - Risk of:  Goal: Absence of physical injury  Description  Absence of physical injury  3/28/2019 0029 by Nakul Diaz RN  Outcome: Met This Shift     Problem: Restraint Use - Nonviolent/Non-Self-Destructive Behavior:  Goal: Absence of restraint-related injury  Description  Absence of restraint-related injury  3/28/2019 0029 by Nakul Diaz RN  Outcome: Met This Shift     Problem: Aspiration:  Goal: Absence of aspiration  Description  Absence of aspiration  Outcome: Met This Shift     Problem: Gas Exchange - Impaired:  Goal: Levels of oxygenation will improve  Description  Levels of oxygenation will improve  Outcome: Met This Shift     Problem: Risk for Impaired Skin Integrity  Goal: Tissue integrity - skin and mucous membranes  Description  Structural intactness and normal physiological function of skin and  mucous membranes. 3/28/2019 0029 by Nakul Diaz RN  Outcome: Ongoing     Problem: Discharge Planning:  Goal: Participates in care planning  Description  Participates in care planning  Outcome: Ongoing     Problem: Discharge Planning:  Goal: Discharged to appropriate level of care  Description  Discharged to appropriate level of care  Outcome: Ongoing     Problem: Anxiety/Stress:  Goal: Level of anxiety will decrease  Description  Level of anxiety will decrease  Outcome: Ongoing     Problem:  Bowel Function - Altered:  Goal: Bowel elimination is within specified parameters  Description  Bowel elimination is within specified parameters  3/28/2019 0029 by Nakul Diaz RN  Outcome: Ongoing     Problem: Fluid Volume - Imbalance:  Goal: Absence of imbalanced fluid volume signs and symptoms  Description  Absence of imbalanced fluid volume signs and symptoms  Outcome: Ongoing     Problem: Nutrition Deficit:  Goal: Ability to achieve adequate nutritional intake will improve  Description  Ability to achieve adequate nutritional intake will improve  Outcome: Ongoing     Problem: Pain:  Goal: Pain level will decrease  Description  Pain level will decrease  Outcome: Ongoing     Problem: Pain:  Goal: Recognizes and communicates pain  Description  Recognizes and communicates pain  Outcome: Ongoing     Problem: Pain:  Goal: Control of acute pain  Description  Control of acute pain  Outcome: Ongoing     Problem: Skin Integrity - Impaired:  Goal: Will show no infection signs and symptoms  Description  Will show no infection signs and symptoms  Outcome: Ongoing     Problem: Skin Integrity - Impaired:  Goal: Absence of new skin breakdown  Description  Absence of new skin breakdown  Outcome: Ongoing     Problem: Sleep Pattern Disturbance:  Goal: Appears well-rested  Description  Appears well-rested  Outcome: Ongoing     Problem: Tissue Perfusion, Altered:  Goal: Circulatory function within specified parameters  Description  Circulatory function within specified parameters  Outcome: Ongoing     Problem: Restraint Use - Nonviolent/Non-Self-Destructive Behavior:  Goal: Absence of restraint indications  Description  Absence of restraint indications  3/28/2019 0029 by Harrison Velarde RN  Outcome: Not Met This Shift

## 2019-03-28 NOTE — PROGRESS NOTES
[address] [facility]  Department of Internal Medicine   Internal Medicine Residency   MICU Progress Note    Patient:  Ronal Mcclellan 80 y.o. female  MRN: [id number]     Date of Service: 3/28/2019    Allergy: Patient has no known allergies. CC follow up resp failure   Subjective       Patient seen and examined this morning  - She was intubated, off sedation  - On PS 15, with TC fluctuating 150s to 400s, will monitor, reevaluate and try to extubate in the afternoon   - Give Lasix 40 mg one dose now  Objective     VS: /63   Pulse 102   Temp 99.1 °F (37.3 °C) (Bladder)   Resp 25   Ht 5' (1.524 m)   Wt 109 lb 3.2 oz (49.5 kg)   SpO2 93%   BMI 21.33 kg/m²         I & O - 24hr:     Intake/Output Summary (Last 24 hours) at 3/28/2019 1355  Last data filed at 3/28/2019 1300  Gross per 24 hour   Intake 1587 ml   Output 3060 ml   Net -1473 ml     Physical Exam:  · General Appearance: intubated and off sedation, following commands  · Neck: no adenopathy, no carotid bruit, no JVD, supple, symmetrical, trachea midline and thyroid not enlarged, symmetric, no tenderness/mass/nodules  · Lung: diminished breath sounds bilaterally  · Heart: regular rate and rhythm, S1, S2 normal, no murmur, click, rub or gallop  · Abdomen: soft, non-tender; bowel sounds normal; no masses,  no organomegaly  · Extremities:  extremities normal, atraumatic, no cyanosis or edema  · Musculoskeletal: No joint swelling, no muscle tenderness. ROM normal in all joints of extremities.    · Neurologic:  intubated and off sedation, following commands  Lines     site day    Art line   None    TLC R IJ 03/24   PICC None    Hemoaccess None       Mechanical Ventilation:   Mode: PS        ABG:     Lab Results   Component Value Date    PH 7.471 03/28/2019    PCO2 45.2 03/28/2019    PO2 72.7 03/28/2019    HCO3 32.2 03/28/2019    BE 7.7 03/28/2019    THB 9.8 03/28/2019    O2SAT 94.7 03/28/2019        Medications       ATB:   Antibiotics  Days   zyvox 03/25 stopped   cefepime 03/23         Skin issues:   Patient currently has   Urinary cath  Isolation  Restraints  DVT prophylaxis/ GI prophylaxis,    PT/OT  SNF/NH placement  Palliative care consult   Labs     CBC with Differential:    Lab Results   Component Value Date    WBC 8.7 03/28/2019    RBC 2.97 03/28/2019    HGB 8.4 03/28/2019    HCT 27.0 03/28/2019     03/28/2019    MCV 90.9 03/28/2019    MCH 28.3 03/28/2019    MCHC 31.1 03/28/2019    RDW 18.1 03/28/2019    NRBC 0.9 03/27/2019    SEGSPCT 73 12/02/2011    BANDSPCT 27 09/20/2014    METASPCT 6 09/20/2014    LYMPHOPCT 3.5 03/28/2019    MONOPCT 7.4 03/28/2019    MYELOPCT 0.9 03/27/2019    BASOPCT 0.1 03/28/2019    MONOSABS 0.64 03/28/2019    LYMPHSABS 0.30 03/28/2019    EOSABS 0.00 03/28/2019    BASOSABS 0.01 03/28/2019     CMP:    Lab Results   Component Value Date     03/28/2019    K 4.3 03/28/2019    K 4.1 03/24/2019     03/28/2019    CO2 32 03/28/2019    BUN 37 03/28/2019    CREATININE 0.8 03/28/2019    GFRAA >60 03/28/2019    LABGLOM >60 03/28/2019    GLUCOSE 136 03/28/2019    GLUCOSE 95 03/09/2012    PROT 5.7 03/28/2019    LABALBU 2.8 03/28/2019    LABALBU 4.5 03/09/2012    CALCIUM 8.4 03/28/2019    BILITOT 0.4 03/28/2019    ALKPHOS 58 03/28/2019    AST 28 03/28/2019    ALT 27 03/28/2019     Magnesium:    Lab Results   Component Value Date    MG 1.8 03/28/2019     Phosphorus:    Lab Results   Component Value Date    PHOS 2.2 03/28/2019     PT/INR:    Lab Results   Component Value Date    PROTIME 28.7 03/28/2019    INR 2.5 03/28/2019     U/A:    Lab Results   Component Value Date    COLORU Yellow 03/23/2019    PROTEINU 30 03/23/2019    PHUR 5.5 03/23/2019    LABCAST RARE 03/23/2019    WBCUA NONE 03/23/2019    RBCUA 1-3 03/23/2019    BACTERIA NONE 03/23/2019    CLARITYU Clear 03/23/2019    SPECGRAV 1.025 03/23/2019    LEUKOCYTESUR Negative 03/23/2019    UROBILINOGEN 0.2 03/23/2019    BILIRUBINUR Negative 03/23/2019    BLOODU Negative 03/23/2019    GLUCOSEU Negative 03/23/2019     ABG:    Lab Results   Component Value Date    PH 7.471 03/28/2019    PCO2 45.2 03/28/2019    PO2 72.7 03/28/2019    HCO3 32.2 03/28/2019    BE 7.7 03/28/2019    O2SAT 94.7 03/28/2019       Imaging Studies:  CXR:     Impression   Cardiomegaly   Findings compatible with atherosclerotic disease of the aorta. There is a right perihilar infiltrate improved in the interval. Right   upper lobe infiltrate has improved suggesting resolving atelectasis               Resident's Assessment and Plan     Pulmonary   Acute hypoxic hypercapnic respiratory failure  - likely 2/2 COPD (home o2) exacerbation from metapneumo virus   - initial ABGs: 7.206/81/149.8/31.4  - pt still intubated on PS, off sedation, will try to extubate in the afternoon   - CXR: R LL atelectasis, improving   - Respiratory viral panel positive for metapneumo virus  - Hx of pseudomonas infection June 2018. - Follow resp culture, procalcitonin 0.75 and deescalate as needed  - Solumedrol 40mg q8h  - breathing treatment: duoneb,budesonide and perforomist  - Daily CXR and ABGs,   - On cefepime  - monitor resp status    Cardiovascular   Elevated troponin  - troponin 0.04  - EKG: Sinus tachycardia     Hypernatremia, resolved,   - Decrease free water bolus to 100 cc q6hr  - Na - [phone]  - monitor BMP     HLD  - on lipitor    Hematology   Supratherapeutic INR, resolved   - now subtherapeutic.   - INR 3.6-3.4-2.4-2.0-2.5  - was on warfarin 4mg, started with 2.5 mg, pharmacy following with dosing   - monitor daily INR     GI  GERD  - on protonix     Endocrine  Hypothyroidism  - on synthroid 75     PT/OT evaluation:Not yet ordered  DVT prophylaxis/ GI prophylaxis: warfarin/ protonix  Disposition: JODIE Matt MD, PGY-1    Attending physician: Dr. Annika Ring I personally saw, examined and provided care for the patient. Radiographs, labs and medication list were reviewed by me independently.

## 2019-03-28 NOTE — PROGRESS NOTES
Pharmacy Consultation Note  (Warfarin Dosing and Monitoring)    Initial consult date: 3/24  Consulting physician: Sunitha Nova    Allergies:  Patient has no known allergies. 80 y.o. female    Ht Readings from Last 1 Encounters:   03/23/19 5' (1.524 m)     Wt Readings from Last 1 Encounters:   03/28/19 109 lb 3.2 oz (49.5 kg)         Warfarin Indication Target   INR Range Home Dose  (if applicable) Diet/Feeding Tube   (Enteral feeds, nutritional drinks and increased Vitamin K in diet can decrease INR)   atrial fibrillation   2-3 4 mg daily per nursing home records-warfarin resumed around 3/18. Held since Dec 2018 d/t ICH s/p fall Continuous tube feeds       x Home Med? Meds Increasing INR x Home Med?  Meds Decreasing INR     Allopurinol    Azathioprine     Amiodarone/Propafenone/Dronedarone   Carbamazepine     Androgens   Cholestyramine     Chemotherapy (BBW: Capecitabine)   Estrogen     Ciprofloxacin/Levofloxacin   Nafcillin/Dicloxacillin     Clarithromycin/Erythromycin/Azithromycin   Barbiturates      Fluconazole/Itraconazole/Voriconazole/Ketoconazole   Phenytoin (Variable)     Metronidazole   Rifampin     Phenytoin (Variable) X  Steroids (Variable/Dose Dependent)   X X  Statins/Fenofibrate/Gemfibrozil   Sucralfate   X  Steroids (Variable/Dose Dependent)   Other:     Sulfamethoxazole/Trimethoprim        Tramadol         Other:       Comments regarding medication interactions:      x Diseases Affecting INR x Increased Bleeding Risk    CHF Exacerbation (Increases)  History GI Bleed/PUD    Liver Disease (Increases)  Chronic NSAID Use    Thyroid: Hyper (Increases)  Hypo (Decreases)  Chronic ASA/Antiplatelet Use (Clopidogrel/ Dipyridamole/Prasugrel/Ticagrelor)      Malignancy (Increases)  Abnormal Renal Function (dialysis, renal transplant, SCr ? 2.3 mg/dL)     History of EtOH Abuse: Acute (Increases)   Chronic (Decreases)  Liver Function (cirrhosis, bilirubin >2x ULN with AST/ALT/AP >3x ULN)    Fever (Increases) X Age > 65 years    Acute infection (Increases) X Hypertension/Uncontrolled BP    Diarrhea/Dehydration (Increases)  History of stroke    Other: __________________  Other:___________________     Vitamin K or Blood product  Administration Date                    TSH:    Lab Results   Component Value Date    TSH 4.620 06/15/2018        Hepatic Function Panel:                            Lab Results   Component Value Date    ALKPHOS 58 03/28/2019    ALT 27 03/28/2019    AST 28 03/28/2019    PROT 5.7 03/28/2019    BILITOT 0.4 03/28/2019    BILIDIR <0.2 09/19/2014    IBILI 0.5 09/19/2014    LABALBU 2.8 03/28/2019    LABALBU 4.5 03/09/2012       Date Warfarin Dose INR Heparin or LMWH HBG/HCT PLT Comment   3/22 Held 3.6 --- 12.1/40.8 231    3/23 Held 3.4 --- 9.8/33.3 184    3/24 Held --- --- 9.2/30.2 182    3/25 2.5 mg 2.4 --- 9.8/32.3 222    3/26 2.5 mg  1.8 --- 8.7/28.4 156    3/27 2.5 mg 2.0 --- 9/29 228    3/28 2.5 mg 2.5 --- 8.4/27 237      Assessment and Plan:  · 80year old female who presented with fever/SOB from facility and is admitted for respiratory failure/pneumonia. Currently intubated and sedated, tube feeds ordered. · Receiving cefepime   · On warfarin 4 mg daily for atrial fibrillation per nursing home records-warfarin resumed around 3/18. Held since Dec 2018 d/t ICH s/p fall  · Maintenance dose at [facility] (December 2018): 5 mg on Fri.; 2.5 mg all other days  · INR today=2.5; goal INR 2-3. · Warfarin 2.5 mg tonight  · Daily PT/INR until the INR is stable within the therapeutic range  · Pharmacist will follow and monitor/adjust dosing as necessary    Thank you for this consult.     Romaine Myles, PharmD Candidate [id number]  3/28/2019 9:59 AM

## 2019-03-28 NOTE — PROGRESS NOTES
Subjective: Intubated on vent support. Agitated again overnight. Objective:    BP (!) 130/56   Pulse 90   Temp 99.7 °F (37.6 °C) (Bladder)   Resp 26   Ht 5' (1.524 m)   Wt 109 lb 3.2 oz (49.5 kg)   SpO2 97%   BMI 21.33 kg/m²     Current medications that patient is taking have been reviewed. Heart:  RRR, no murmurs, gallops, or rubs.   Lungs:  Decreased breath sounds bilaterally  Abd: bowel sounds present, soft, nontender, nondistended, no masses  Extrem:  No cyanosis or edema    CBC with Differential:    Lab Results   Component Value Date    WBC 8.7 03/28/2019    RBC 2.97 03/28/2019    HGB 8.4 03/28/2019    HCT 27.0 03/28/2019     03/28/2019    MCV 90.9 03/28/2019    MCH 28.3 03/28/2019    MCHC 31.1 03/28/2019    RDW 18.1 03/28/2019    NRBC 0.9 03/27/2019    SEGSPCT 73 12/02/2011    BANDSPCT 27 09/20/2014    METASPCT 6 09/20/2014    LYMPHOPCT 3.5 03/28/2019    MONOPCT 7.4 03/28/2019    MYELOPCT 0.9 03/27/2019    BASOPCT 0.1 03/28/2019    MONOSABS 0.64 03/28/2019    LYMPHSABS 0.30 03/28/2019    EOSABS 0.00 03/28/2019    BASOSABS 0.01 03/28/2019     CMP:    Lab Results   Component Value Date     03/28/2019    K 4.3 03/28/2019    K 4.1 03/24/2019     03/28/2019    CO2 32 03/28/2019    BUN 37 03/28/2019    CREATININE 0.8 03/28/2019    GFRAA >60 03/28/2019    LABGLOM >60 03/28/2019    GLUCOSE 136 03/28/2019    GLUCOSE 95 03/09/2012    PROT 5.7 03/28/2019    LABALBU 2.8 03/28/2019    LABALBU 4.5 03/09/2012    CALCIUM 8.4 03/28/2019    BILITOT 0.4 03/28/2019    ALKPHOS 58 03/28/2019    AST 28 03/28/2019    ALT 27 03/28/2019     BMP:    Lab Results   Component Value Date     03/28/2019    K 4.3 03/28/2019    K 4.1 03/24/2019     03/28/2019    CO2 32 03/28/2019    BUN 37 03/28/2019    LABALBU 2.8 03/28/2019    LABALBU 4.5 03/09/2012    CREATININE 0.8 03/28/2019    CALCIUM 8.4 03/28/2019    GFRAA >60 03/28/2019    LABGLOM >60 03/28/2019    GLUCOSE 136 03/28/2019    GLUCOSE 95 03/09/2012     Magnesium:    Lab Results   Component Value Date    MG 1.8 03/28/2019     Phosphorus:    Lab Results   Component Value Date    PHOS 2.2 03/28/2019     PT/INR:    Lab Results   Component Value Date    PROTIME 28.7 03/28/2019    INR 2.5 03/28/2019     PTT:    Lab Results   Component Value Date    APTT 45.9 09/19/2014   [APTT}  ABG:    Lab Results   Component Value Date    PH 7.471 03/28/2019    PCO2 45.2 03/28/2019    PO2 72.7 03/28/2019    HCO3 32.2 03/28/2019    BE 7.7 03/28/2019    O2SAT 94.7 03/28/2019        Assessment:    Patient Active Problem List   Diagnosis    Pulmonary hypertension ([facility] [address]    Paroxysmal atrial fibrillation    Mixed hyperlipidemia    Pleural effusion    HTN (hypertension), benign    CAD (coronary artery disease), native coronary artery    Pneumonia    Acute on chronic respiratory failure with hypoxia and hypercapnia (HCC)    COPD (chronic obstructive pulmonary disease) ([facility] [address]    Acquired hypothyroidism    Moderate protein-calorie malnutrition ([facility] [address]       Plan:  Stable. Rate controlled. INR therapeutic. Continue aggressive lipid therapy  Stable. Blood pressure ok, continue current medications  Continue medical management of ASCAD. Possible acute bacterial pneumonia on top of metapneumo virus infection. ID on board. On cefepime  Secondary to COPD exacerbation, pneumonia and human metapneumovirus infection. On vent support. Continue breathing treatments. Continue synthroid. Continue ICU care.     Félix Barkley    9:57 AM  3/28/2019

## 2019-03-28 NOTE — PLAN OF CARE
Problem: Restraint Use - Nonviolent/Non-Self-Destructive Behavior:  Goal: Absence of restraint-related injury  Description  Absence of restraint-related injury  3/28/2019 0825 by Jesus Flannery RN  Outcome: Met This Shift  3/28/2019 0029 by Nilda Saleh RN  Outcome: Met This Shift

## 2019-03-28 NOTE — FLOWSHEET NOTE
Patient attempts to reach for endo tube with any patient care, restraints continued for patient safety

## 2019-03-28 NOTE — CARE COORDINATION
3/28  Transition of care notes  Remains in icu  Intubated on vent  On propofal  Alert  Cont to attempt wean vent  Iv steroids  Iv antib   No family present  Awaiting icu team for plan of care  Should return to [facility] when stable  M [address] Wayne RN Case Manager

## 2019-03-28 NOTE — PROGRESS NOTES
Physical Therapy    Pt on PT caseload for treatment. Spoke with RN prior to treatment. They are assessing pt's tolerance to PSV. Hold treatment per RN until resident physicians round on pt and assess vent weaning tolerance. Will check back as appropriate. Thank you.     pA Jaramillo, PT, DPT  [id number]

## 2019-03-28 NOTE — FLOWSHEET NOTE
Patient reaching for lines and endo tube, unable to follow commands at this time, restraints continued for patient safety

## 2019-03-28 NOTE — PROGRESS NOTES
ID Progress Note                [address], [place], [address] [facility]            Phone [phone]     Fax [phone]      Chief complaint    Fever, weakness, shortness of breath  S/p intubation      Subjective:  still intubated , awake  The patient is awake, Afebrile. Objective:    Vitals:    03/28/19 1800   BP: (!) 105/57   Pulse: 104   Resp: 24   Temp:    SpO2: 93%     GENERAL:  The patient is awake, agitated, still intubated. HEENT:  Atraumatic, normocephalic. PERRLA. EOMI. ET tube in place. RESPIRATORY:  Air entry bilaterally equal.  No wheezes or crackles. CARDIOVASCULAR:  S1 and S2 normal.  Tachycardiac. ABDOMEN:  Soft. Nontender, nondistended. Bowel sounds present. EXTREMITIES:  No pedal edema. NEUROLOGIC:  Grossly intact. LINES:  She has right IJ in place in the peripheral line. Palomo  catheter in place. Labs:  Recent Labs     03/26/19  0500 03/27/19  0600 03/28/19  0555   WBC 7.0 10.1 8.7   RBC 3.10* 3.22* 2.97*   HGB 8.7* 9.0* 8.4*   HCT 28.4* 29.0* 27.0*   MCV 91.6 90.1 90.9   MCH 28.1 28.0 28.3   MCHC 30.6* 31.0* 31.1*   RDW 17.2* 17.7* 18.1*    228 237   MPV 10.9 10.4 10.5     CMP:    Lab Results   Component Value Date     03/28/2019    K 4.3 03/28/2019    K 4.1 03/24/2019     03/28/2019    CO2 32 03/28/2019    BUN 37 03/28/2019    CREATININE 0.8 03/28/2019    GFRAA >60 03/28/2019    LABGLOM >60 03/28/2019    GLUCOSE 136 03/28/2019    GLUCOSE 95 03/09/2012    PROT 5.7 03/28/2019    LABALBU 2.8 03/28/2019    LABALBU 4.5 03/09/2012    CALCIUM 8.4 03/28/2019    BILITOT 0.4 03/28/2019    ALKPHOS 58 03/28/2019    AST 28 03/28/2019    ALT 27 03/28/2019          Microbiology :  No results for input(s): BC in the last 72 hours. No results for input(s): Sherif Sony in the last 72 hours. No results for input(s): LABURIN in the last 72 hours. No results for input(s): CULTRESP in the last 72 hours.   No results for input(s): WNDABS in the

## 2019-03-29 NOTE — PROGRESS NOTES
Magnesium:    Lab Results   Component Value Date    MG 1.9 03/29/2019     Phosphorus:    Lab Results   Component Value Date    PHOS 2.9 03/29/2019     PT/INR:    Lab Results   Component Value Date    PROTIME 30.5 03/29/2019    INR 2.7 03/29/2019     PTT:    Lab Results   Component Value Date    APTT 45.9 09/19/2014   [APTT}  ABG:    Lab Results   Component Value Date    PH 7.529 03/29/2019    PCO2 37.2 03/29/2019    PO2 147.9 03/29/2019    HCO3 30.3 03/29/2019    BE 7.2 03/29/2019    O2SAT 98.9 03/29/2019        Assessment:    Patient Active Problem List   Diagnosis    Pulmonary hypertension ([facility] [address]    Paroxysmal atrial fibrillation    Mixed hyperlipidemia    Pleural effusion    HTN (hypertension), benign    CAD (coronary artery disease), native coronary artery    Pneumonia    Acute on chronic respiratory failure with hypoxia and hypercapnia (HCC)    COPD (chronic obstructive pulmonary disease) ([facility] [address]    Acquired hypothyroidism    Moderate protein-calorie malnutrition ([facility] [address]       Plan:  Stable. Rate controlled. INR therapeutic. Continue aggressive lipid therapy  Stable. Blood pressure ok, continue current medications  Continue medical management of ASCAD. Possible acute bacterial pneumonia on top of metapneumo virus infection. ID on board. On cefepime  Secondary to COPD exacerbation, pneumonia and human metapneumovirus infection. On vent support. Continue breathing treatments. Continue synthroid. Continue ICU care.     Michael Ashford    10:53 AM  3/29/2019

## 2019-03-29 NOTE — CARE COORDINATION
3/29   Transition of care notes  Remains in icu  Intubated on vent  propofal off this am  Cont wean attempts  Patient alert   No family present  Cont iv steroid  Iv antib    Per id notes-  PLAN:   continue with cefepime change to q12, will consider change to PO Levaquin  once stable to complete 14 day course from the start date    Plan should be to return to [facility] when stable  BRANDEN San RN Case Manager

## 2019-03-29 NOTE — PROGRESS NOTES
in consultation. Please contact us with any questions. Office [phone] or after hours through [employer], x [phone].

## 2019-03-29 NOTE — PROGRESS NOTES
Attempted PS 12 and within 3 minutes, patients heart rate climbed from 128 to 181 bpm. Placed patient back in [facility] mode and alerted residents

## 2019-03-29 NOTE — PROGRESS NOTES
ID Progress Note                [address], [place], [address]            Phone [phone]     Fax [phone]      Chief complaint    Fever, weakness, shortness of breath  S/p intubation      Subjective:  still intubated , awake, WILL LIKELY EXTUBATE TODAY  The patient is awake, Afebrile. Objective:    Vitals:    03/29/19 0840   BP: 95/72   Pulse: 132   Resp:    Temp:    SpO2:      GENERAL:  The patient is awake, agitated, still intubated. HEENT:  Atraumatic, normocephalic. PERRLA. EOMI. ET tube in place. RESPIRATORY:  Air entry bilaterally equal.  No wheezes or crackles. CARDIOVASCULAR:  S1 and S2 normal.  Tachycardiac. ABDOMEN:  Soft. Nontender, nondistended. Bowel sounds present. EXTREMITIES:  No pedal edema. NEUROLOGIC:  Grossly intact. LINES:  She has right IJ in place in the peripheral line. Palomo  catheter in place. Labs:  Recent Labs     03/27/19  0600 03/28/19  0555 03/29/19  0540   WBC 10.1 8.7 10.2   RBC 3.22* 2.97* 3.24*   HGB 9.0* 8.4* 9.1*   HCT 29.0* 27.0* 29.3*   MCV 90.1 90.9 90.4   MCH 28.0 28.3 28.1   MCHC 31.0* 31.1* 31.1*   RDW 17.7* 18.1* 18.0*    237 295   MPV 10.4 10.5 10.0     CMP:    Lab Results   Component Value Date     03/29/2019    K 3.7 03/29/2019    K 4.1 03/24/2019    CL 94 03/29/2019    CO2 35 03/29/2019    BUN 46 03/29/2019    CREATININE 1.0 03/29/2019    GFRAA >60 03/29/2019    LABGLOM 53 03/29/2019    GLUCOSE 227 03/29/2019    GLUCOSE 95 03/09/2012    PROT 6.0 03/29/2019    LABALBU 3.2 03/29/2019    LABALBU 4.5 03/09/2012    CALCIUM 8.6 03/29/2019    BILITOT 0.4 03/29/2019    ALKPHOS 62 03/29/2019    AST 31 03/29/2019    ALT 31 03/29/2019          Microbiology :  No results for input(s): BC in the last 72 hours. No results for input(s): Georgann Favia in the last 72 hours. No results for input(s): LABURIN in the last 72 hours. No results for input(s): CULTRESP in the last 72 hours.   No results for input(s): WNDABS in the last 72 hours. Radiology :  noted      ASSESSMENT AND PLAN:  1. Nosocomial pneumonia, pseudomonas pneumonia   so far no MRSA isolated from the lungs. 2.  Metapneumovirus viral infection. 3.  History of COPD/past history of smoking. Recent right-sided pleural  effusion transudative, unknown etiology. 4.  Coronary artery disease status post stent/AFib/supratherapeutic INR  that is normalized now.   5.  History of subdural hematoma.     PLAN:   continue with cefepime change to q12, will consider change to PO Levaquin  once stable to complete 14 day course from the start date  On solumedrol 40 q8hrly as per ICU team                Electronically signed by Stephen Washington MD on 3/29/2019 at 9:09 AM

## 2019-03-29 NOTE — PLAN OF CARE
Problem: Restraint Use - Nonviolent/Non-Self-Destructive Behavior:  Goal: Absence of restraint-related injury  Description  Absence of restraint-related injury  3/29/2019 0707 by Oliverio Griffin RN  Outcome: Met This Shift     Problem: Restraint Use - Nonviolent/Non-Self-Destructive Behavior:  Goal: Absence of restraint indications  Description  Absence of restraint indications  3/29/2019 0707 by Oliverio Griffin RN  Outcome: Not Met This Shift

## 2019-03-29 NOTE — PROGRESS NOTES
Nutrition Assessment (Enteral Nutrition)    Type and Reason for Visit: Reassess    Nutrition Summary: Pt improving from a nutritional standpoint tolerating TF at goal rate. However remains at risk w/ noted moderate malnutrition & ongoing intubation. Will provide updated TF rec based on pt's current status. Nutrition Recommendations: Continue NPO, Modify current Tube Feeding to non-fiber containing d/t low MAPS:      Standard without fiber (Osmolite 1.2) @ 35 ml/hr + 1 protein modular daily   Will provide: 840 ml tv, 1008 kcals, 47 gm pro (1108 kcals & 73 gm pro w/ modular), 689 ml free water, 1089 ml total water w/ flushes    Malnutrition Assessment:  · Malnutrition Status: Meets the criteria for moderate malnutrition  · Context: Chronic illness  · Findings of the 6 clinical characteristics of malnutrition (Minimum of 2 out of 6 clinical characteristics is required to make the diagnosis of moderate or severe Protein Calorie Malnutrition based on AND/ASPEN Guidelines):  1. Energy Intake-Less than or equal to 75% of estimated energy requirement, Greater than or equal to 3 months    2. Weight Loss-Unable to assess(d/t fluctuations ), unable to assess  3. Fat Loss-Moderate subcutaneous fat loss, Orbital  4. Muscle Loss-Moderate muscle mass loss, Temples (temporalis muscle), Clavicles (pectoralis and deltoids)  5. Fluid Accumulation-No significant fluid accumulation    6.  Strength-Not measured    Nutrition Risk Level: Moderate    Nutrition Needs:  · Estimated Daily Total Kcal: 8849-9988 (PS3B: MV 4.73, Tmax 37.6; )  · Estimated Daily Protein (g): 60-70 (1.3-1.5 g/kg )  · Estimated Daily Fluid (ml/day): per critical care     Nutrition Diagnosis:   · Problem:  Moderate malnutrition, In context of chronic illness  · Etiology: related to Catabolic illness     Signs and symptoms:  as evidenced by Diet history of poor intake, Moderate muscle loss, Moderate loss of subcutaneous fat    Objective Information:  · Nutrition-Focused Physical Findings: Pt remains intubated, agitation noted, hypotension w/ low MAPS (58-69 this afternoon) not on pressor, +I/O's, trace edema, boggy B/L heels, weakness, active BS, OGT w/ TF      · Wound Type: None     · Current Nutrition Therapies:  · Oral Diet Orders: NPO   · Oral Diet intake:    · Tube Feeding (TF) Orders:   · Feeding Route: Orogastric  · Formula: Standard w/Fiber  · Rate (ml/hr):35 ml/hr     · Volume (ml/day): 840 ml tv   · Duration: Continuous  · Additives/Modulars:    · Water Flushes: 50 ml q 3 hr= 400 ml water   · Current TF & Flush Orders Provides: 1008 kcals, 47 gm pro (1108 kcals & 73 gm pro w/ modular), 678 ml free water, 1078 ml total water w/ flushes   · Goal TF & Flush Orders Provides: at goal on pump   · Additional Calories: d/c      · Anthropometric Measures:  · Ht: 5' (152.4 cm)   · Current Body Wt: 99 lb (44.9 kg)(3/29 actual )  · Admission Body Wt: 100 lb (45.4 kg)(3/23 first measured)  · Usual Body Wt: (EMR actuals wts 89-94lb 1 year back )  · Weight Change: noted wt fluctuations since admit likely d/t fluid shifts, unable to properly assess    · Ideal Body Wt: 100 lb (45.4 kg), % Ideal Body 99%  · BMI Classification: BMI 18.5 - 24.9 Normal Weight    Nutrition Interventions:   Continued Inpatient Monitoring, Coordination of Care, Education Initiated(TF explained to family- no questions at this time)    Nutrition Evaluation:   · Evaluation: Goals set   · Goals: Pt to tolerate TF formula change at goal rate    · Monitoring: TF Intake, TF Tolerance, Skin Integrity, I&O, Mental Status/Confusion, Monitor Hemodynamic Status, Monitor Bowel Function, Weight, Pertinent Labs      Electronically signed by Dulce Atkinson RD, LD on 3/29/19 at 2:49 PM    Contact Number: Ext [phone]

## 2019-03-29 NOTE — PLAN OF CARE
Problem: Malnutrition  (NI-5.2)  Goal: Food and/or Nutrient Delivery  Description- Switch TF formula to standard without fiber @ 35 ml/hr + 1 protein modular daily via feeding tube   Individualized approach for food/nutrient provision.   Outcome: Met This Shift

## 2019-03-29 NOTE — PROGRESS NOTES
Pharmacy Consultation Note  (Warfarin Dosing and Monitoring)    Initial consult date: 3/24  Consulting physician: Krish White    Allergies:  Patient has no known allergies. 80 y.o. female    Ht Readings from Last 1 Encounters:   03/23/19 5' (1.524 m)     Wt Readings from Last 1 Encounters:   03/29/19 99 lb 4.8 oz (45 kg)         Warfarin Indication Target   INR Range Home Dose  (if applicable) Diet/Feeding Tube   (Enteral feeds, nutritional drinks and increased Vitamin K in diet can decrease INR)   atrial fibrillation   2-3 4 mg daily per nursing home records-warfarin resumed around 3/18. Held since Dec 2018 d/t ICH s/p fall Continuous tube feeds       x Home Med? Meds Increasing INR x Home Med?  Meds Decreasing INR     Allopurinol    Azathioprine     Amiodarone/Propafenone/Dronedarone   Carbamazepine     Androgens   Cholestyramine     Chemotherapy (BBW: Capecitabine)   Estrogen     Ciprofloxacin/Levofloxacin   Nafcillin/Dicloxacillin     Clarithromycin/Erythromycin/Azithromycin   Barbiturates      Fluconazole/Itraconazole/Voriconazole/Ketoconazole   Phenytoin (Variable)     Metronidazole   Rifampin     Phenytoin (Variable) X  Steroids (Variable/Dose Dependent)   X X  Statins/Fenofibrate/Gemfibrozil   Sucralfate   X  Steroids (Variable/Dose Dependent)   Other:     Sulfamethoxazole/Trimethoprim        Tramadol         Other:       Comments regarding medication interactions:      x Diseases Affecting INR x Increased Bleeding Risk    CHF Exacerbation (Increases)  History GI Bleed/PUD    Liver Disease (Increases)  Chronic NSAID Use    Thyroid: Hyper (Increases)  Hypo (Decreases)  Chronic ASA/Antiplatelet Use (Clopidogrel/ Dipyridamole/Prasugrel/Ticagrelor)      Malignancy (Increases)  Abnormal Renal Function (dialysis, renal transplant, SCr ? 2.3 mg/dL)     History of EtOH Abuse: Acute (Increases)   Chronic (Decreases)  Liver Function (cirrhosis, bilirubin >2x ULN with AST/ALT/AP >3x ULN)    Fever (Increases) X Age > 65 years    Acute infection (Increases) X Hypertension/Uncontrolled BP    Diarrhea/Dehydration (Increases)  History of stroke    Other: __________________  Other:___________________     Vitamin K or Blood product  Administration Date                    TSH:    Lab Results   Component Value Date    TSH 4.620 06/15/2018        Hepatic Function Panel:                            Lab Results   Component Value Date    ALKPHOS 62 03/29/2019    ALT 31 03/29/2019    AST 31 03/29/2019    PROT 6.0 03/29/2019    BILITOT 0.4 03/29/2019    BILIDIR <0.2 09/19/2014    IBILI 0.5 09/19/2014    LABALBU 3.2 03/29/2019    LABALBU 4.5 03/09/2012       Date Warfarin Dose INR Heparin or LMWH HBG/HCT PLT Comment   3/22 Held 3.6 --- 12.1/40.8 231    3/23 Held 3.4 --- 9.8/33.3 184    3/24 Held --- --- 9.2/30.2 182    3/25 2.5 mg 2.4 --- 9.8/32.3 222    3/26 2.5 mg  1.8 --- 8.7/28.4 156    3/27 2.5 mg 2.0 --- 9/29 228    3/28 2.5 mg 2.5 --- 8.4/27 237    3/27 2.5 mg 2.7 --- 9.1/29.3 295      Assessment and Plan:  · 80year old female who presented with fever/SOB from facility and is admitted for respiratory failure/pneumonia. Currently intubated, off sedation, tube feeds ordered. · Receiving cefepime   · On warfarin 4 mg daily for atrial fibrillation per nursing home records-warfarin resumed around 3/18. Held since Dec 2018 d/t ICH s/p fall  · Maintenance dose at [facility] (December 2018): 5 mg on Fri.; 2.5 mg all other days  · INR today=2.7; goal INR 2-3. · Warfarin 2.5 mg tonight  · Daily PT/INR until the INR is stable within the therapeutic range  · Pharmacist will follow and monitor/adjust dosing as necessary    Thank you for this consult.     Eloina Wong, PharmD Candidate [id number]  3/29/2019 8:37 AM

## 2019-03-29 NOTE — FLOWSHEET NOTE
Patient continues to reach for lines and tubes despite alternative measures to deter. Two point soft restraints maintained for patient safety.   Electronically signed by Lavelle Dominguez RN on 3/29/2019 at 4:32 PM

## 2019-03-29 NOTE — PROGRESS NOTES
Still trying to wean from vent. Her sister and niece is at bedside. 3/27/19 Intubated, follows commands nods approprietly. Met with patient's niece. Patient lived with her until recent hospital stay. Plan will be back to ECF. Patient would not want long term support on vent or to be reintubated once extubated. We discussed resuscitation and she wants DNR CC. Will change to limited no to all for now. DNR formed filled out an copy given to niece. Encourage her to give copy to ECF. Despite intubation, patient participated in conversation as well. 3/25/19 Patient intubated and sedated. No family at bedside. Spoke with RN.     - Family Meeting:   Participants:No family meeting held today   Family meeting was held to discuss:no meeting today     ROS:   Unable to obtain  UNLESS STATED ABOVE PATIENT DENIES:  CONSTITUTIONAL:  fever, chill, rigors, nausea, vomiting, fatigue. RESPIRATORY: cough, shortness of breath, sputum expectoration. CARDIOVASCULAR:  Chest pain/pressure, palpitation, syncope, irregular beats  GASTROINTESTINAL:  abdominal or rectal pain, diarrhea, constipation, .   MUSCULOSKELETAL:  pain, edema,       Objective:     Physical Exam  BP 95/72   Pulse 132   Temp 98.6 °F (37 °C) (Bladder)   Resp 26   Ht 5' (1.524 m)   Wt 99 lb 4.8 oz (45 kg)   SpO2 98%   BMI 19.39 kg/m²     Gen:   appears stated age, well nourished, in no acute distress  HEENT:  Normocephalic, conjunctiva pink, no drainage, mucosa moist  Neck:  Supple  Lungs:  CTA bilaterally, no audible rhonchi or wheezes noted  Heart[de-identified]  RRR, no murmur, rub, or gallop noted during exam  Abd:  Soft, non tender, non distended, BS+  Ext:  Moving all extremities, no edema, pulses present  Skin:  Warm and dry  Neuro:  Intubated, opens eyes breifly      Current Medications:  Inpatient/Home medications reviewed:    Results/Verification of Data Review  Objective data reviewed: labs, images, records, medication use, vitals and chart      - Advanced Directives: Living Will and HC-POA   -Surrogate/Legal NOK: Niece    Contacts: Niece [phone] Deneen Boothe [phone], Carry Riya sister [phone],  - Spiritual assessment: No spiritual distress identified     - Bereavement and grief: to be determined    - Discharge planning: to be determined    - Prognosis: unknown    - Referrals to: none today    Time/Communication  Greater than 51% of time spent, total 15 minutes in counseling and coordination of care at the bedside regarding goals of care, diagnosis and prognosis and see above. Kem RAMIREZ-CNP  Palliative Medicine    Discussed patient and the plan of care with the other IDT members of Palliative Med team and with family and floor nurse. Thank you for allowing Palliative Medicine to participate in the care of Saige Obrien. Note: This report was completed using [employer] voiced recognition software. Every effort has been made to ensure accuracy; however, inadvertent computerized transcription errors may be present.

## 2019-03-30 NOTE — PLAN OF CARE
Problem: Restraint Use - Nonviolent/Non-Self-Destructive Behavior:  Goal: Absence of restraint indications  Description  Absence of restraint indications  Outcome: Completed     Problem: Restraint Use - Nonviolent/Non-Self-Destructive Behavior:  Goal: Absence of restraint-related injury  Description  Absence of restraint-related injury  Outcome: Completed

## 2019-03-30 NOTE — PROGRESS NOTES
Patient was extubated to 40% O2 via bipap. Breath Sounds post extubation were dimished bilaterally. Stridor was not present post extubation. SPO2 was 96%. Patient suctioned, has a strong cough and able to state name.      Performed by  Margarita Tay

## 2019-03-30 NOTE — PROGRESS NOTES
ID Progress Note                [address] [facility] [address], [place], [address]            Phone [phone]     Fax [phone]      Chief complaint    Fever, weakness, shortness of breath  S/p intubation      Subjective:  still intubated , awake,  confused, Afebrile. Objective:    Vitals:    03/30/19 1621   BP:    Pulse:    Resp: 28   Temp:    SpO2:      GENERAL:  The patient is awake, agitated, still intubated. HEENT:  Atraumatic, normocephalic. PERRLA. EOMI. ET tube in place. RESPIRATORY:  Air entry bilaterally equal.  No wheezes or crackles. CARDIOVASCULAR:  S1 and S2 normal.  Tachycardiac. ABDOMEN:  Soft. Nontender, nondistended. Bowel sounds present. EXTREMITIES:  No pedal edema. NEUROLOGIC:  Grossly intact. LINES:  She has right IJ in place in the peripheral line. Palomo  catheter in place. Labs:  Recent Labs     03/28/19  0555 03/29/19  0540 03/30/19  0620   WBC 8.7 10.2 10.7   RBC 2.97* 3.24* 3.15*   HGB 8.4* 9.1* 8.9*   HCT 27.0* 29.3* 28.6*   MCV 90.9 90.4 90.8   MCH 28.3 28.1 28.3   MCHC 31.1* 31.1* 31.1*   RDW 18.1* 18.0* 17.8*    295 314   MPV 10.5 10.0 10.0     CMP:    Lab Results   Component Value Date     03/30/2019    K 3.7 03/30/2019    K 4.1 03/24/2019    CL 99 03/30/2019    CO2 32 03/30/2019    BUN 49 03/30/2019    CREATININE 0.9 03/30/2019    GFRAA >60 03/30/2019    LABGLOM 60 03/30/2019    GLUCOSE 151 03/30/2019    GLUCOSE 95 03/09/2012    PROT 5.7 03/30/2019    LABALBU 2.9 03/30/2019    LABALBU 4.5 03/09/2012    CALCIUM 8.3 03/30/2019    BILITOT 0.3 03/30/2019    ALKPHOS 58 03/30/2019    AST 29 03/30/2019    ALT 28 03/30/2019          Microbiology :  resp cx- pseudomonas    ASSESSMENT AND PLAN:  1. Nosocomial pneumonia, pseudomonas pneumonia   so far no MRSA isolated from the lungs. 2.  Metapneumovirus viral infection. 3.  History of COPD/past history of smoking.   Recent right-sided pleural  effusion transudative, unknown

## 2019-03-30 NOTE — PROGRESS NOTES
per MICU team        Thanks for letting us see this patient in consultation. Please contact us with any questions. Office [phone] or after hours through [employer], x [phone].

## 2019-03-30 NOTE — PROGRESS NOTES
Date: 3/30/2019    Time: 2:33 PM    Patient Placed On BIPAP/CPAP/ Non-Invasive Ventilation? Yes    If no must comment. Facial area red/color change? No           If YES are Blister/Lesion present? No   If yes must notify nursing staff  BIPAP/CPAP skin barrier? Yes    Skin barrier type:Liquicel       Comments:    Placed on bipap per verbal order post extubation.  15/6 to achieve adequate VT    [facility]

## 2019-03-30 NOTE — PROGRESS NOTES
03/23/2019    UROBILINOGEN 0.2 03/23/2019    BILIRUBINUR Negative 03/23/2019    BLOODU Negative 03/23/2019    GLUCOSEU Negative 03/23/2019     ABG:    Lab Results   Component Value Date    PH 7.529 03/29/2019    PCO2 37.2 03/29/2019    PO2 147.9 03/29/2019    HCO3 30.3 03/29/2019    BE 7.2 03/29/2019    O2SAT 98.9 03/29/2019       Imaging Studies:  CXR:     Impression   Cardiomegaly   Findings compatible with atherosclerotic disease of the aorta. There is a right perihilar infiltrate improved in the interval. Right   upper lobe infiltrate has improved suggesting resolving atelectasis               Resident's Assessment and Plan     Pulmonary   Acute hypoxic hypercapnic respiratory failure  - likely 2/2 COPD (home o2) exacerbation from metapneumo virus   - initial ABGs: 7.206/81/149.8/31.4  - pt still intubated, off sedation, failed multiple times of weaning trials   - CXR: R LL atelectasis, improving   - Respiratory viral panel positive for metapneumo virus  - Hx of pseudomonas infection June 2018.    - Follow resp culture, procalcitonin 0.75 and deescalate as needed  - Solumedrol 40mg q8h  - breathing treatment: duoneb,budesonide and perforomist  - Daily CXR and ABGs,   - On cefepime  - monitor resp status    Cardiovascular   Elevated troponin  - troponin 0.04  - EKG: Sinus tachycardia     Hypernatremia, resolved  - Decrease free water bolus to 100 cc q6hr  - Na - [phone]  - monitor BMP     HLD  - on lipitor    Hematology   Supratherapeutic INR, resolved   - now subtherapeutic.   - INR 3.6-3.4-2.4-2.0-2.5  - was on warfarin 4mg, started with 2.5 mg, pharmacy following with dosing   - monitor daily INR     GI  GERD  on protonix  On TF     Endocrine  Hypothyroidism  - on synthroid 75     PT/OT evaluation:Not yet ordered  DVT prophylaxis/ GI prophylaxis: warfarin/ protonix  [facility]    Niesha Ta MD, PGY-1    Attending physician: Dr. Oscar Laird    I personally saw, examined and provided care for the patient. Radiographs, labs and medication list were reviewed by me independently. I spoke with bedside nursing, therapists and consultants. Critical care services and times documented are independent of procedures and multidisciplinary rounds with Residents. Additionally comprehensive, multidisciplinary rounds were conducted with the MICU team. The case was discussed in detail and plans for care were established. Review of Residents documentation was conducted and revisions were made as appropriate. I agree with the above documented exam, problem list and plan of care with the following additions:    afib with RVR today - given rate control  Diuresis  Bronchodilators  Wean steroids to q12h  Antibiotics per ID, CXR improving  Unable to wean - will need to consider trache    35 minutes of CCT spent with the patient, reviewing the chart including imaging studies, and discussing the case with other health care professionals.     Electronically signed by Diandra Adkins MD on 3/29/2019 at 8:53 PM

## 2019-03-30 NOTE — FLOWSHEET NOTE
Pt axious but cooperative, restraints removed while providing care, pt nods in understanding re:education of maintaining airway. Although awake pt does not reach for ETT.  Wrist restraints discontinued

## 2019-03-30 NOTE — PROGRESS NOTES
Subjective: Intubated on vent support. Agitated again overnight. Objective:    /66   Pulse 109   Temp 99.1 °F (37.3 °C) (Core)   Resp 30   Ht 5' (1.524 m)   Wt 95 lb 0.3 oz (43.1 kg)   SpO2 99%   BMI 18.56 kg/m²     Current medications that patient is taking have been reviewed. Heart:  RRR, no murmurs, gallops, or rubs.   Lungs:  Decreased breath sounds bilaterally  Abd: bowel sounds present, soft, nontender, nondistended, no masses  Extrem:  No cyanosis or edema    CBC with Differential:    Lab Results   Component Value Date    WBC 10.7 03/30/2019    RBC 3.15 03/30/2019    HGB 8.9 03/30/2019    HCT 28.6 03/30/2019     03/30/2019    MCV 90.8 03/30/2019    MCH 28.3 03/30/2019    MCHC 31.1 03/30/2019    RDW 17.8 03/30/2019    NRBC 0.9 03/27/2019    SEGSPCT 73 12/02/2011    BANDSPCT 27 09/20/2014    METASPCT 6 09/20/2014    LYMPHOPCT 5.9 03/30/2019    MONOPCT 10.0 03/30/2019    MYELOPCT 0.9 03/27/2019    BASOPCT 0.1 03/30/2019    MONOSABS 1.07 03/30/2019    LYMPHSABS 0.63 03/30/2019    EOSABS 0.03 03/30/2019    BASOSABS 0.01 03/30/2019     CMP:    Lab Results   Component Value Date     03/30/2019    K 3.7 03/30/2019    K 4.1 03/24/2019    CL 99 03/30/2019    CO2 32 03/30/2019    BUN 49 03/30/2019    CREATININE 0.9 03/30/2019    GFRAA >60 03/30/2019    LABGLOM 60 03/30/2019    GLUCOSE 151 03/30/2019    GLUCOSE 95 03/09/2012    PROT 5.7 03/30/2019    LABALBU 2.9 03/30/2019    LABALBU 4.5 03/09/2012    CALCIUM 8.3 03/30/2019    BILITOT 0.3 03/30/2019    ALKPHOS 58 03/30/2019    AST 29 03/30/2019    ALT 28 03/30/2019     BMP:    Lab Results   Component Value Date     03/30/2019    K 3.7 03/30/2019    K 4.1 03/24/2019    CL 99 03/30/2019    CO2 32 03/30/2019    BUN 49 03/30/2019    LABALBU 2.9 03/30/2019    LABALBU 4.5 03/09/2012    CREATININE 0.9 03/30/2019    CALCIUM 8.3 03/30/2019    GFRAA >60 03/30/2019    LABGLOM 60 03/30/2019    GLUCOSE 151 03/30/2019    GLUCOSE 95 03/09/2012

## 2019-03-30 NOTE — PROGRESS NOTES
Pharmacy Consultation Note  (Warfarin Dosing and Monitoring)    Initial consult date: 3/24  Consulting physician: Bebo Cerna    Allergies:  Patient has no known allergies. 80 y.o. female    Ht Readings from Last 1 Encounters:   03/23/19 5' (1.524 m)     Wt Readings from Last 1 Encounters:   03/30/19 95 lb 0.3 oz (43.1 kg)         Warfarin Indication Target   INR Range Home Dose  (if applicable) Diet/Feeding Tube   (Enteral feeds, nutritional drinks and increased Vitamin K in diet can decrease INR)   atrial fibrillation   2-3 4 mg daily per nursing home records-warfarin resumed around 3/18. Held since Dec 2018 d/t ICH s/p fall Continuous tube feeds           Vitamin K or Blood product  Administration Date                    TSH:    Lab Results   Component Value Date    TSH 4.620 06/15/2018        Hepatic Function Panel:                            Lab Results   Component Value Date    ALKPHOS 58 03/30/2019    ALT 28 03/30/2019    AST 29 03/30/2019    PROT 5.7 03/30/2019    BILITOT 0.3 03/30/2019    BILIDIR <0.2 09/19/2014    IBILI 0.5 09/19/2014    LABALBU 2.9 03/30/2019    LABALBU 4.5 03/09/2012       Date Warfarin Dose INR Heparin or LMWH HBG/HCT PLT Comment   3/22 Held 3.6 --- 12.1/40.8 231    3/23 Held 3.4 --- 9.8/33.3 184    3/24 Held --- --- 9.2/30.2 182    3/25 2.5 mg 2.4 --- 9.8/32.3 222    3/26 2.5 mg  1.8 --- 8.7/28.4 156    3/27 2.5 mg 2.0 --- 9/29 228    3/28 2.5 mg 2.5 --- 8.4/27 237    3/29 2.5 mg 2.7 --- 9.1/29.3 295    3/30  2.5 mg  2.5   8.9/28.6  314      Assessment:  · 80year old female who presented with fever/SOB from facility and is admitted for respiratory failure/pneumonia secondary to human metapneumovirus.  Currently intubated day #9,  tube feeds ordered  · Clinical pharmacist consulted to dose warfarin: indication: afib, home dose: warfarin 4 mg daily per nursing home records -warfarin resumed around 3/18 as outpatient, had been Held since Dec 2018 d/t ICH s/p fall  · INR today=2.5; goal INR 2-3    Plan:  · Warfarin 2.5 mg tonight  · Daily PT/INR until the INR is stable within the therapeutic range  · Pharmacist will follow and monitor/adjust dosing as necessary    Thank you for this consult.     Rogelio AnnaD, BCPS, BCCCP 3/30/2019 9:58 AM

## 2019-03-30 NOTE — PROGRESS NOTES
LSW for Palliative Medicine updated with bedside RN. Pt extubated today, is alert. No family present at this time. PM will remain available as needed.

## 2019-03-30 NOTE — PROGRESS NOTES
[address] [facility]  Department of Internal Medicine   Internal Medicine Residency   MICU Progress Note    Patient:  Juvencio Hernandes 80 y.o. female  MRN: [id number]     Date of Service: 3/30/2019    Allergy: Patient has no known allergies. CC follow up resp failure   Subjective       Patient seen and examined this morning  - overnight, patient was agitated and put back on propofl  - was put on PS 12 during rounds with fluctuating TV  - will extubate to BiPAP later this morning      Objective     VS: BP (!) 101/51   Pulse 112   Temp 99.3 °F (37.4 °C)   Resp (!) 36   Ht 5' (1.524 m)   Wt 95 lb 0.3 oz (43.1 kg)   SpO2 (!) 82%   BMI 18.56 kg/m²         I & O - 24hr:     Intake/Output Summary (Last 24 hours) at 3/30/2019 4711  Last data filed at 3/30/2019 9180  Gross per 24 hour   Intake 2058 ml   Output 985 ml   Net 1073 ml     Physical Exam:  · General Appearance: intubated and off sedation, following commands  · Neck: no adenopathy, no carotid bruit, no JVD, supple, symmetrical, trachea midline and thyroid not enlarged, symmetric, no tenderness/mass/nodules  · Lung: diminished breath sounds bilaterally  · Heart: regular rate and rhythm, S1, S2 normal, no murmur, click, rub or gallop  · Abdomen: soft, non-tender; bowel sounds normal; no masses,  no organomegaly  · Extremities:  extremities normal, atraumatic, no cyanosis or edema  · Musculoskeletal: No joint swelling, no muscle tenderness. ROM normal in all joints of extremities.    · Neurologic:  intubated and off sedation, following commands  Lines     site day    Art line   None    TLC R IJ 03/24   PICC None    Hemoaccess None       Mechanical Ventilation:   Mode: PS        ABG:     Lab Results   Component Value Date    PH 7.470 03/30/2019    PCO2 45.7 03/30/2019    PO2 123.6 03/30/2019    HCO3 32.5 03/30/2019    BE 8.0 03/30/2019    THB 10.1 03/30/2019    O2SAT 98.4 03/30/2019        Medications       ATB:   Antibiotics  Days   zyvox 03/25 stopped cefepime 03/23         Skin issues:   Patient currently has   Urinary cath  Isolation  Restraints  DVT prophylaxis/ GI prophylaxis,    PT/OT  SNF/NH placement  Palliative care consult   Labs     CBC with Differential:    Lab Results   Component Value Date    WBC 10.7 03/30/2019    RBC 3.15 03/30/2019    HGB 8.9 03/30/2019    HCT 28.6 03/30/2019     03/30/2019    MCV 90.8 03/30/2019    MCH 28.3 03/30/2019    MCHC 31.1 03/30/2019    RDW 17.8 03/30/2019    NRBC 0.9 03/27/2019    SEGSPCT 73 12/02/2011    BANDSPCT 27 09/20/2014    METASPCT 6 09/20/2014    LYMPHOPCT 5.9 03/30/2019    MONOPCT 10.0 03/30/2019    MYELOPCT 0.9 03/27/2019    BASOPCT 0.1 03/30/2019    MONOSABS 1.07 03/30/2019    LYMPHSABS 0.63 03/30/2019    EOSABS 0.03 03/30/2019    BASOSABS 0.01 03/30/2019     CMP:    Lab Results   Component Value Date     03/30/2019    K 3.7 03/30/2019    K 4.1 03/24/2019    CL 99 03/30/2019    CO2 32 03/30/2019    BUN 49 03/30/2019    CREATININE 0.9 03/30/2019    GFRAA >60 03/30/2019    LABGLOM 60 03/30/2019    GLUCOSE 151 03/30/2019    GLUCOSE 95 03/09/2012    PROT 5.7 03/30/2019    LABALBU 2.9 03/30/2019    LABALBU 4.5 03/09/2012    CALCIUM 8.3 03/30/2019    BILITOT 0.3 03/30/2019    ALKPHOS 58 03/30/2019    AST 29 03/30/2019    ALT 28 03/30/2019     Magnesium:    Lab Results   Component Value Date    MG 2.2 03/30/2019     Phosphorus:    Lab Results   Component Value Date    PHOS 2.1 03/30/2019     PT/INR:    Lab Results   Component Value Date    PROTIME 28.1 03/30/2019    INR 2.5 03/30/2019     U/A:    Lab Results   Component Value Date    COLORU Yellow 03/23/2019    PROTEINU 30 03/23/2019    PHUR 5.5 03/23/2019    LABCAST RARE 03/23/2019    WBCUA NONE 03/23/2019    RBCUA 1-3 03/23/2019    BACTERIA NONE 03/23/2019    CLARITYU Clear 03/23/2019    SPECGRAV 1.025 03/23/2019    LEUKOCYTESUR Negative 03/23/2019    UROBILINOGEN 0.2 03/23/2019    BILIRUBINUR Negative 03/23/2019    BLOODU Negative 03/23/2019 GLUCOSEU Negative 03/23/2019     ABG:    Lab Results   Component Value Date    PH 7.470 03/30/2019    PCO2 45.7 03/30/2019    PO2 123.6 03/30/2019    HCO3 32.5 03/30/2019    BE 8.0 03/30/2019    O2SAT 98.4 03/30/2019       Imaging Studies:  CXR:     Impression   Cardiomegaly   Findings compatible with atherosclerotic disease of the aorta. There is a right perihilar infiltrate improved in the interval. Right   upper lobe infiltrate has improved suggesting resolving atelectasis               Resident's Assessment and Plan     Pulmonary   Acute hypoxic hypercapnic respiratory failure  - likely 2/2 COPD (home o2) exacerbation from metapneumo virus   - initial ABGs: 7.206/81/149.8/31.4  - pt still intubated, on propofol, failed multiple times of weaning trials, On PS 12 will try extubate later  - CXR: R LL atelectasis, improving   - Respiratory viral panel positive for metapneumo virus  - Hx of pseudomonas infection June 2018. - Follow resp culture, procalcitonin 0.75 and deescalate as needed  - Solumedrol 40mg q12h  - breathing treatment: duoneb,budesonide and perforomist  - Daily CXR and ABGs,   - On cefepime  - monitor resp status    Cardiovascular   Elevated troponin  - troponin 0.04  - EKG: Sinus tachycardia     Hypernatremia, resolved  - Decrease free water bolus to 100 cc q6hr  - Na - [phone]  - monitor BMP     HLD  - on lipitor    Hematology   Supratherapeutic INR, resolved   - now subtherapeutic.   - INR 3.6-3.4-2.4-2.0-2.5  - was on warfarin 4mg, started with 2.5 mg, pharmacy following with dosing   - monitor daily INR     GI  GERD  on protonix  On TF     Endocrine  Hypothyroidism  - on synthroid 75     PT/OT evaluation:Not yet ordered  DVT prophylaxis/ GI prophylaxis: warfarin/ protonix  Disposition: JODIE Fregoso MD, PGY-1    Attending physician: Dr. Dejuan Carranza I personally saw, examined and provided care for the patient.  Radiographs, labs and medication list were reviewed by me independently. I spoke with bedside nursing, therapists and consultants. Critical care services and times documented are independent of procedures and multidisciplinary rounds with Residents. Additionally comprehensive, multidisciplinary rounds were conducted with the MICU team. The case was discussed in detail and plans for care were established. Review of Residents documentation was conducted and revisions were made as appropriate. I agree with the above documented exam, problem list and plan of care with the following additions:    Proceed with extubation to BiPAP, NIV overnight  Wean solumedrol today  Continue bronchodilators  Continue antibiotics as per ID  PT/OT  Discussed with Dr. Lucila Aldrich    32 minutes of CCT spent with the patient, reviewing the chart including imaging studies, and discussing the case with other health care professionals.     Electronically signed by Dona Brand MD on 3/30/2019 at 9:05 PM

## 2019-03-30 NOTE — PLAN OF CARE
Problem: Falls - Risk of:  Goal: Will remain free from falls  Description  Will remain free from falls  Outcome: Met This Shift     Problem: Falls - Risk of:  Goal: Absence of physical injury  Description  Absence of physical injury  Outcome: Met This Shift     Problem: Risk for Impaired Skin Integrity  Goal: Tissue integrity - skin and mucous membranes  Description  Structural intactness and normal physiological function of skin and  mucous membranes.   Outcome: Met This Shift     Problem: Anxiety/Stress:  Goal: Level of anxiety will decrease  Description  Level of anxiety will decrease  Outcome: Met This Shift     Problem: Aspiration:  Goal: Absence of aspiration  Description  Absence of aspiration  Outcome: Met This Shift     Problem: Fluid Volume - Imbalance:  Goal: Absence of imbalanced fluid volume signs and symptoms  Description  Absence of imbalanced fluid volume signs and symptoms  Outcome: Met This Shift     Problem: Gas Exchange - Impaired:  Goal: Levels of oxygenation will improve  Description  Levels of oxygenation will improve  Outcome: Met This Shift  Note:   Extubated to BiPAP     Problem: Pain:  Goal: Pain level will decrease  Description  Pain level will decrease  Outcome: Met This Shift     Problem: Pain:  Goal: Recognizes and communicates pain  Description  Recognizes and communicates pain  Outcome: Met This Shift     Problem: Pain:  Goal: Control of acute pain  Description  Control of acute pain  Outcome: Met This Shift     Problem: Pain:  Goal: Control of chronic pain  Description  Control of chronic pain  Outcome: Met This Shift     Problem: Skin Integrity - Impaired:  Goal: Will show no infection signs and symptoms  Description  Will show no infection signs and symptoms  Outcome: Met This Shift     Problem: Skin Integrity - Impaired:  Goal: Absence of new skin breakdown  Description  Absence of new skin breakdown  Outcome: Met This Shift     Problem: Sleep Pattern Disturbance:  Goal: Appears well-rested  Description  Appears well-rested  Outcome: Met This Shift     Problem: Tissue Perfusion, Altered:  Goal: Circulatory function within specified parameters  Description  Circulatory function within specified parameters  Outcome: Met This Shift

## 2019-03-31 NOTE — PROGRESS NOTES
stent/AFib/supratherapeutic INR  that is normalized now.   5.  History of subdural hematoma.     PLAN:   continue with cefepime q12        Electronically signed by Latisha Whittaker MD on 3/31/2019 at 7:43 PM

## 2019-03-31 NOTE — PROGRESS NOTES
Date: 3/31/2019    Time: 2:59 PM    Patient Placed On BIPAP/CPAP/ Non-Invasive Ventilation? Yes    If no must comment. Facial area red/color change? Pt nose becoming red           If YES are Blister/Lesion present? No   If yes must notify nursing staff  BIPAP/CPAP skin barrier? Yes    Skin barrier type:mepilex       Comments: noticed patients nose becoming red when BIPAP was removed earlier this morning.  Mepilex was put in place of the [address]

## 2019-03-31 NOTE — PROGRESS NOTES
[address] [facility]  Department of Internal Medicine   Internal Medicine Residency   MICU Progress Note    Patient:  Pilo Harrison 80 y.o. female  MRN: [id number]     Date of Service: 3/31/2019    Allergy: Patient has no known allergies. CC follow up resp failure   Subjective     Patient seen and examined this morning  - extubated, on BiPAP, will wean down to NC oxygen as tolerated  - passed swallowing eval, will start dental soft diet later  - wean solu-medrol to prednisone        Objective     VS: /71   Pulse 95   Temp 97.9 °F (36.6 °C) (Core)   Resp 29   Ht 5' (1.524 m)   Wt 98 lb 1.7 oz (44.5 kg)   SpO2 100%   BMI 19.16 kg/m²         I & O - 24hr:     Intake/Output Summary (Last 24 hours) at 3/31/2019 1332  Last data filed at 3/31/2019 1300  Gross per 24 hour   Intake 378 ml   Output 1255 ml   Net -877 ml     Physical Exam:  · General Appearance: extubated on BiPAP  · Neck: no adenopathy, no carotid bruit, no JVD, supple, symmetrical, trachea midline and thyroid not enlarged, symmetric, no tenderness/mass/nodules  · Lung: diminished breath sounds bilaterally  · Heart: regular rate and rhythm, S1, S2 normal, no murmur, click, rub or gallop  · Abdomen: soft, non-tender; bowel sounds normal; no masses,  no organomegaly  · Extremities:  extremities normal, atraumatic, no cyanosis or edema  · Musculoskeletal: No joint swelling, no muscle tenderness. ROM normal in all joints of extremities.    · Neurologic:  extubated on BiPAP  ·   Lines     site day    Art line   None    TLC R IJ Removed    PICC None    Hemoaccess None            ABG:     Lab Results   Component Value Date    PH 7.443 03/31/2019    PCO2 52.9 03/31/2019    PO2 141.2 03/31/2019    HCO3 35.4 03/31/2019    BE 9.8 03/31/2019    THB 10.5 03/31/2019    O2SAT 98.7 03/31/2019        Medications       ATB:   Antibiotics  Days   zyvox 03/25 stopped   cefepime 03/23         Skin issues:   Patient currently has   Urinary cath  Isolation  Restraints  DVT prophylaxis/ GI prophylaxis,    PT/OT  SNF/NH placement  Palliative care consult   Labs     CBC with Differential:    Lab Results   Component Value Date    WBC 9.3 03/31/2019    RBC 3.30 03/31/2019    HGB 9.5 03/31/2019    HCT 30.3 03/31/2019     03/31/2019    MCV 91.8 03/31/2019    MCH 28.8 03/31/2019    MCHC 31.4 03/31/2019    RDW 17.4 03/31/2019    NRBC 0.9 03/27/2019    SEGSPCT 73 12/02/2011    BANDSPCT 27 09/20/2014    METASPCT 6 09/20/2014    LYMPHOPCT 5.9 03/30/2019    MONOPCT 10.0 03/30/2019    MYELOPCT 0.9 03/27/2019    BASOPCT 0.1 03/30/2019    MONOSABS 1.07 03/30/2019    LYMPHSABS 0.63 03/30/2019    EOSABS 0.03 03/30/2019    BASOSABS 0.01 03/30/2019     CMP:    Lab Results   Component Value Date     03/31/2019    K 3.5 03/31/2019    K 4.1 03/24/2019     03/31/2019    CO2 30 03/31/2019    BUN 45 03/31/2019    CREATININE 0.8 03/31/2019    GFRAA >60 03/31/2019    LABGLOM >60 03/31/2019    GLUCOSE 97 03/31/2019    GLUCOSE 95 03/09/2012    PROT 5.9 03/31/2019    LABALBU 3.1 03/31/2019    LABALBU 4.5 03/09/2012    CALCIUM 8.4 03/31/2019    BILITOT 0.4 03/31/2019    ALKPHOS 66 03/31/2019    AST 27 03/31/2019    ALT 27 03/31/2019     Magnesium:    Lab Results   Component Value Date    MG 2.0 03/31/2019     Phosphorus:    Lab Results   Component Value Date    PHOS 2.4 03/31/2019     PT/INR:    Lab Results   Component Value Date    PROTIME 23.2 03/31/2019    INR 2.0 03/31/2019     U/A:    Lab Results   Component Value Date    COLORU Yellow 03/23/2019    PROTEINU 30 03/23/2019    PHUR 5.5 03/23/2019    LABCAST RARE 03/23/2019    WBCUA NONE 03/23/2019    RBCUA 1-3 03/23/2019    BACTERIA NONE 03/23/2019    CLARITYU Clear 03/23/2019    SPECGRAV 1.025 03/23/2019    LEUKOCYTESUR Negative 03/23/2019    UROBILINOGEN 0.2 03/23/2019    BILIRUBINUR Negative 03/23/2019    BLOODU Negative 03/23/2019    GLUCOSEU Negative 03/23/2019     ABG:    Lab Results   Component Value Date independent of procedures and multidisciplinary rounds with Residents. Additionally comprehensive, multidisciplinary rounds were conducted with the MICU team. The case was discussed in detail and plans for care were established. Review of Residents documentation was conducted and revisions were made as appropriate. I agree with the above documented exam, problem list and plan of care with the following additions:    Doing fair with extubation  Nocturnal and prn NIV  Steroid wean, bronchodilators  Abx per ID  Remove central line  Okay for digoxin - rate control has been difficult  Monitor in ICU given high risk for deterioration    32 minutes of CCT spent with the patient, reviewing the chart including imaging studies, and discussing the case with other health care professionals.     Electronically signed by Violette Napoles MD on 3/31/2019 at 8:38 PM

## 2019-03-31 NOTE — PROGRESS NOTES
Date: 3/31/2019    Time: 4:40 PM    Patient Placed On BIPAP/CPAP/ Non-Invasive Ventilation? No    If no must comment. Facial area red/color change? Slightly red, mepilex in place          If YES are Blister/Lesion present? No   If yes must notify nursing staff  BIPAP/CPAP skin barrier?   Yes    Skin barrier type:mepilex       Comments:  Wearing from previous shift        Noemy Gardner

## 2019-03-31 NOTE — PROGRESS NOTES
Subjective:    Awake. Extubated (3/30/2019). Follows commands. Seems confused. Offers no complaints. Objective:    /79   Pulse 95   Temp 98.8 °F (37.1 °C)   Resp 23   Ht 5' (1.524 m)   Wt 98 lb 1.7 oz (44.5 kg)   SpO2 95%   BMI 19.16 kg/m²     Current medications that patient is taking have been reviewed. Heart:  RRR, no murmurs, gallops, or rubs.   Lungs:  Decreased breath sounds bilaterally  Abd: bowel sounds present, soft, nontender, nondistended, no masses  Extrem:  No cyanosis or edema    CBC with Differential:    Lab Results   Component Value Date    WBC 9.3 03/31/2019    RBC 3.30 03/31/2019    HGB 9.5 03/31/2019    HCT 30.3 03/31/2019     03/31/2019    MCV 91.8 03/31/2019    MCH 28.8 03/31/2019    MCHC 31.4 03/31/2019    RDW 17.4 03/31/2019    NRBC 0.9 03/27/2019    SEGSPCT 73 12/02/2011    BANDSPCT 27 09/20/2014    METASPCT 6 09/20/2014    LYMPHOPCT 5.9 03/30/2019    MONOPCT 10.0 03/30/2019    MYELOPCT 0.9 03/27/2019    BASOPCT 0.1 03/30/2019    MONOSABS 1.07 03/30/2019    LYMPHSABS 0.63 03/30/2019    EOSABS 0.03 03/30/2019    BASOSABS 0.01 03/30/2019     CMP:    Lab Results   Component Value Date     03/31/2019    K 3.5 03/31/2019    K 4.1 03/24/2019     03/31/2019    CO2 30 03/31/2019    BUN 45 03/31/2019    CREATININE 0.8 03/31/2019    GFRAA >60 03/31/2019    LABGLOM >60 03/31/2019    GLUCOSE 97 03/31/2019    GLUCOSE 95 03/09/2012    PROT 5.9 03/31/2019    LABALBU 3.1 03/31/2019    LABALBU 4.5 03/09/2012    CALCIUM 8.4 03/31/2019    BILITOT 0.4 03/31/2019    ALKPHOS 66 03/31/2019    AST 27 03/31/2019    ALT 27 03/31/2019     BMP:    Lab Results   Component Value Date     03/31/2019    K 3.5 03/31/2019    K 4.1 03/24/2019     03/31/2019    CO2 30 03/31/2019    BUN 45 03/31/2019    LABALBU 3.1 03/31/2019    LABALBU 4.5 03/09/2012    CREATININE 0.8 03/31/2019    CALCIUM 8.4 03/31/2019    GFRAA >60 03/31/2019    LABGLOM >60 03/31/2019    GLUCOSE 97 03/31/2019    GLUCOSE 95 03/09/2012     Magnesium:    Lab Results   Component Value Date    MG 2.0 03/31/2019     Phosphorus:    Lab Results   Component Value Date    PHOS 2.4 03/31/2019     PT/INR:    Lab Results   Component Value Date    PROTIME 23.2 03/31/2019    INR 2.0 03/31/2019     PTT:    Lab Results   Component Value Date    APTT 45.9 09/19/2014   [APTT}  ABG:    Lab Results   Component Value Date    PH 7.443 03/31/2019    PCO2 52.9 03/31/2019    PO2 141.2 03/31/2019    HCO3 35.4 03/31/2019    BE 9.8 03/31/2019    O2SAT 98.7 03/31/2019        Assessment:    Patient Active Problem List   Diagnosis    Pulmonary hypertension ([facility]    Paroxysmal atrial fibrillation    Mixed hyperlipidemia    Pleural effusion    HTN (hypertension), benign    CAD (coronary artery disease), native coronary artery    Pneumonia    Acute on chronic respiratory failure with hypoxia and hypercapnia (HCC)    COPD (chronic obstructive pulmonary disease) ([facility]    Acquired hypothyroidism    Moderate protein-calorie malnutrition ([facility]       Plan:  Stable. Rate controlled. INR therapeutic. Continue aggressive lipid therapy  Stable. Blood pressure ok, continue current medications  Continue medical management of ASCAD. Possible acute bacterial pneumonia on top of metapneumo virus infection. ID on board. On cefepime  Secondary to COPD exacerbation, pneumonia and human metapneumovirus infection. Extubated. Continue aggressive pulmonary hygiene. Continue breathing treatments. Continue synthroid. Continue ICU care.     Carlos Duran    8:40 AM  3/31/2019

## 2019-03-31 NOTE — PROGRESS NOTES
Patient taken off the BIPAP and placed on a NC 5 LPM to eat dinner. SPO2 100%.  Patient nose becoming red, liquicel removed and mepilex placed on nose

## 2019-03-31 NOTE — PROGRESS NOTES
[facility] Quality Flow/Interdisciplinary Rounds Progress Note        Quality Flow Rounds held on March 31, 2019    Disciplines Attending:  Bedside Nurse, Charge nurse, JONATHAN, OT, PT, , and . Keyon Sousa was admitted on 3/22/2019 10:19 PM    Anticipated Discharge Date:  Expected Discharge Date: 04/06/19    Disposition:    David Score:  David Scale Score: 16    Readmission Risk              Risk of Unplanned Readmission:        30           Discussed patient goal for the day, patient clinical progression, and barriers to discharge. The following Goal(s) of the Day/Commitment(s) have been identified:  monitor O2 needs, replace electrolytes, Digoxin, PT/OT consult, monitor vitals, possible transfer later if needed, wean steroids.        Omar Hylton  March 31, 2019

## 2019-03-31 NOTE — PROGRESS NOTES
[facility] [address] [facility]  [address] [facility], [address]  [facility], [address] [facility]      Pulmonary Progress Note      SUBJECTIVE:  Was on NC at 3 li this morning, got tired after moved around and cleaned up so placed on BIPAP, awake, nodding and gesturing appropriately to questions, looking slightly anxious    OBJECTIVE    Medications    Continuous Infusions:      Scheduled Meds:   predniSONE  40 mg Oral Lunch    cefepime  2 g Intravenous Q12H    docusate sodium  100 mg Oral Daily    chlorhexidine  15 mL Mouth/Throat BID    lidocaine PF  5 mL Intradermal Once    heparin flush  3 mL Intravenous 2 times per day    warfarin (COUMADIN) daily dosing (placeholder)   Other RX Placeholder    ipratropium-albuterol  3 mL Inhalation Q4H WA    atorvastatin  10 mg Oral Daily    metoprolol tartrate  25 mg Oral BID    levothyroxine  75 mcg Oral Daily    budesonide  1 mg Nebulization BID    formoterol  20 mcg Nebulization Q12H       PRN Meds:sodium chloride flush, heparin flush, acetaminophen    Physical    VITALS:  /70   Pulse 104   Temp 98.4 °F (36.9 °C) (Core)   Resp (!) 34   Ht 5' (1.524 m)   Wt 98 lb 1.7 oz (44.5 kg)   SpO2 100%   BMI 19.16 kg/m²     24HR INTAKE/OUTPUT:      Intake/Output Summary (Last 24 hours) at 3/31/2019 1559  Last data filed at 3/31/2019 1428  Gross per 24 hour   Intake 673 ml   Output 1250 ml   Net -577 ml       24HR PULSE OXIMETRY RANGE:    SpO2  Av %  Min: 89 %  Max: 100 %    General appearance: alert, appears stated age and cooperative  Lungs: rhonchi bilaterally  Heart: regular rate and rhythm, S1, S2 normal, no murmur, click, rub or gallop  Abdomen: soft, non-tender; bowel sounds normal; no masses,  no organomegaly  Extremities: extremities normal, atraumatic, no cyanosis or edema  Neurologic: Mental status: awake, looks at me with conversation but minimal interaction, not looking in distress    Data    CBC:   Recent Labs 03/29/19  0540 03/30/19  0620 03/31/19  0700   WBC 10.2 10.7 9.3   HGB 9.1* 8.9* 9.5*   HCT 29.3* 28.6* 30.3*   MCV 90.4 90.8 91.8    314 338       BMP:  Recent Labs     03/29/19  0540 03/30/19  0620 03/31/19  0700    139 145   K 3.7 3.7 3.5   CL 94* 99 103   CO2 35* 32* 30*   PHOS 2.9 2.1* 2.4*   BUN 46* 49* 45*   CREATININE 1.0 0.9 0.8    ALB:3,BILIDIR:3,BILITOT:3,ALKPHOS:3)@    PT/INR:   Recent Labs     03/30/19  0620 03/31/19  0520 03/31/19  0700   PROTIME 28.1* see note* 23.2*   INR 2.5 see note* 2.0       ABG:   Recent Labs     03/29/19  0535  03/31/19  0707   PH 7.529*   < > 7.443   PO2 147.9*   < > 141.2*   PCO2 37.2   < > 52.9*   HCO3 30.3*   < > 35.4*   BE 7.2*   < > 9.8*   O2SAT 98.9*   < > 98.7*   METHB 0.3   < > 0.5   O2HB 98.4*   < > 97.8*   COHB 0.2   < > 0.4   O2CON 14.7  --   --    HHB 1.1   < > 1.3   THB 10.4*   < > 10.5*    < > = values in this interval not displayed. FiO2 : 40 %  I:E Ratio: 1:2.40    Radiology/Other tests reviewed: CXR reviewed looking similar to previous    Assessment:     Principal Problem:    Acute on chronic respiratory failure with hypoxia and hypercapnia (HCC)  Active Problems:    Pulmonary hypertension (HCC)    Paroxysmal atrial fibrillation    Mixed hyperlipidemia    Pleural effusion    HTN (hypertension), benign    CAD (coronary artery disease), native coronary artery    Pneumonia    COPD (chronic obstructive pulmonary disease) (HCC)    Acquired hypothyroidism    Moderate protein-calorie malnutrition (HCC)  Resolved Problems:    COPD with acute exacerbation (HCC)    History of ST elevation myocardial infarction (STEMI)    CHB (complete heart block) (HCC)    PNA (pneumonia)    Respiratory failure ([address]      Plan:       1. Cont with BIPAP qhs and prn daytime, NC rest of the time, observe respiratory function  2. Cont with nebs  3. Steroid taper as tolerated  4. Other issues as per MICU team        Thanks for letting us see this patient in consultation. Please contact us with any questions. Office [phone] or after hours through [employer], x [phone].

## 2019-04-01 NOTE — PROGRESS NOTES
Subjective:    Awake alert. Extubated (3/30/2019). Follows commands. Breathing better. Using NIPPV. Tolerating diet. Offers no complaints. Objective:    /65   Pulse 84   Temp 98.4 °F (36.9 °C) (Core)   Resp 25   Ht 5' (1.524 m)   Wt 100 lb 5 oz (45.5 kg)   SpO2 100%   BMI 19.59 kg/m²     Current medications that patient is taking have been reviewed. Heart:  RRR, no murmurs, gallops, or rubs.   Lungs:  Decreased breath sounds bilaterally  Abd: bowel sounds present, soft, nontender, nondistended, no masses  Extrem:  No cyanosis or edema    CBC with Differential:    Lab Results   Component Value Date    WBC 12.0 04/01/2019    RBC 3.18 04/01/2019    HGB 9.0 04/01/2019    HCT 29.6 04/01/2019     04/01/2019    MCV 93.1 04/01/2019    MCH 28.3 04/01/2019    MCHC 30.4 04/01/2019    RDW 17.1 04/01/2019    NRBC 0.9 03/27/2019    SEGSPCT 73 12/02/2011    BANDSPCT 27 09/20/2014    METASPCT 6 09/20/2014    LYMPHOPCT 2.6 04/01/2019    MONOPCT 7.8 04/01/2019    MYELOPCT 1.7 04/01/2019    BASOPCT 0.2 04/01/2019    MONOSABS 0.96 04/01/2019    LYMPHSABS 0.36 04/01/2019    EOSABS 0.00 04/01/2019    BASOSABS 0.00 04/01/2019     CMP:    Lab Results   Component Value Date     04/01/2019    K 3.9 04/01/2019    K 4.1 03/24/2019     04/01/2019    CO2 28 04/01/2019    BUN 48 04/01/2019    CREATININE 0.9 04/01/2019    GFRAA >60 04/01/2019    LABGLOM 60 04/01/2019    GLUCOSE 89 04/01/2019    GLUCOSE 95 03/09/2012    PROT 6.1 04/01/2019    LABALBU 3.2 04/01/2019    LABALBU 4.5 03/09/2012    CALCIUM 8.6 04/01/2019    BILITOT 0.4 04/01/2019    ALKPHOS 72 04/01/2019    AST 30 04/01/2019    ALT 34 04/01/2019     BMP:    Lab Results   Component Value Date     04/01/2019    K 3.9 04/01/2019    K 4.1 03/24/2019     04/01/2019    CO2 28 04/01/2019    BUN 48 04/01/2019    LABALBU 3.2 04/01/2019    LABALBU 4.5 03/09/2012    CREATININE 0.9 04/01/2019    CALCIUM 8.6 04/01/2019    GFRAA >60 04/01/2019 LABGLOM 60 04/01/2019    GLUCOSE 89 04/01/2019    GLUCOSE 95 03/09/2012     Magnesium:    Lab Results   Component Value Date    MG 1.8 04/01/2019     Phosphorus:    Lab Results   Component Value Date    PHOS 2.3 04/01/2019     PT/INR:    Lab Results   Component Value Date    PROTIME 28.3 04/01/2019    INR 2.5 04/01/2019     PTT:    Lab Results   Component Value Date    APTT 45.9 09/19/2014   [APTT}  ABG:    Lab Results   Component Value Date    PH 7.353 04/01/2019    PCO2 64.5 04/01/2019    PO2 144.2 04/01/2019    HCO3 35.1 04/01/2019    BE 7.9 04/01/2019    O2SAT 98.8 04/01/2019        Assessment:    Patient Active Problem List   Diagnosis    Pulmonary hypertension ([facility]    Paroxysmal atrial fibrillation    Mixed hyperlipidemia    Pleural effusion    HTN (hypertension), benign    CAD (coronary artery disease), native coronary artery    Pneumonia    Acute on chronic respiratory failure with hypoxia and hypercapnia (HCC)    COPD (chronic obstructive pulmonary disease) ([facility]    Acquired hypothyroidism    Moderate protein-calorie malnutrition ([facility]       Plan:  Stable. Rate controlled. INR therapeutic. Continue aggressive lipid therapy  Stable. Blood pressure ok, continue current medications  Continue medical management of ASCAD. Possible acute bacterial pneumonia on top of metapneumo virus infection. ID on board. On cefepime  Secondary to COPD exacerbation, pneumonia and human metapneumovirus infection. Extubated. Continue aggressive pulmonary hygiene. Continue breathing treatments. Continue synthroid. Pt/Ot evaluations for discharge planning. .    [facility]    10:03 AM  4/1/2019

## 2019-04-01 NOTE — CARE COORDINATION
4/1  Transition of care notes  Remains in icu  Wean extubated on 3/30  In isolation  Bi pap at hs  On 3l nc  Id following  Plan for another 5 days po antib.   Plan to return to [facility] when stable  Per nursing  Sob with acitivity  Pt ot jeanineal  Did not ambulate  amapc 9/24  Awaiting icu team for plan of care  Malinda Alvarado RN Case Manager

## 2019-04-01 NOTE — PROGRESS NOTES
OCCUPATIONAL THERAPY TREATMENT NOTE    Date:2019  Patient Name: Leandro Carney  MRN: [id number]  : 1934  Room: [address] [place] [address]    Evaluating OT: ANAYELI Vivas/L    AM-PAC Daily Activity Raw Score:   Recommended Adaptive Equipment: to be determined     Comments: Based on patient's functional performance as stated above and level of assistance needed prior to admission, this therapist believes that the patient would benefit from further skilled OT following hospital stay in an effort to increase safety, functional independence, and quality of life. Diagnosis: PNA  Pertinent Medical History: Acute MI, CAD, cardiogenic shock, complete heart block, dermatitis, hypercholesterolemia, HLD, HTN, hypothyroid, paroxysmal a-fib, PNA, shingles, thyroid disease, warfarin induced coagulopathy     Precautions:  Falls, contact and droplet isolation, , spinal neutrality (L1 compression fx mid , conservative tx)     Home Living: Pt admitted from [place] [address]- Usually lives with sister and niece in a 2 story with 1st floor set up, with 2 step(s) to enter and 1 rail(s); bed/bath on main level  Bathroom setup: tub shower combo  Equipment owned: shower chair, rollator    Prior Level of Function: Assistance with ADLs and with IADLs; using ww for ambulation. Pain Level: Denies pain at rest  Cognition: A&O: to self and place, to month with cues, and not to year; Follows 1-2 step directions roughly 50% of the time. Yes and no answers not always appropriate. Memory: P   Sequencing: P   Problem solving: P   Judgement/safety: P   RASS: +1  CAM-ICU: positive     Functional Assessment:   Initial Eval Status  Date: 3/27/19 Treatment Status  Date: 19 Short Term Goals  Treatment frequency: 1-3x/week on MICU; PRN on stepdown unit  -pt will. ..    Feeding Dependent  OGT Min A    improve self feeding task to independent when medically appropriate   Grooming Max A  To brush hair while seated EOB, pt dropped hair brush while PT collaboration. Upon arrival patient supine with HOB slightly elevated. Supine to sit. Sat EOB roughly 4 minutes- max A/dep for sitting balance d/t rigidity and retropulsion; Sit to stand pivot to bedside chair for supported sitting. Patient's breathing labored as noted above. Attempted LB dressing as above. Grooming completed as above. Sit to stand to ww, pt unable to advance LE's, stand to sit. Sit to stand pivot without ww to EOB, stand to sit, sit to supine. Patient left in care of PT to properly position using pillows and bed mechanics to semi chair to improve interaction with environment, overall functioning and decrease/prevent edema and contractures. Pt required cues and education as noted above for safe facilitation and completion of tasks. During functional activites and ADLs pt educated on proper hand placement, safety technique, sequencing, and energy conservation techniques. Therapist provided skilled monitoring of HR, O2 saturation, blood pressure and patient's response during treatment session. Prior to and at the end of session, environmental modifications/line management completed for patients safety and efficiency of treatment session. Patient has made poor progress toward set goals, continue with POC. Will update POC if pt continues to make marginal progress.      40 min timed tx     Tx time in: 8510  Tx time out: [address], OTR/L  [id number]

## 2019-04-01 NOTE — PROGRESS NOTES
[facility]  [address] [facility], [address]  [facility], [address] [facility]      Pulmonary Progress Note      SUBJECTIVE:  Was on NC at 3 li this morning, BIPAP last night, awake, nodding and gesturing appropriately to questions, mod cough/sputum production    OBJECTIVE    Medications    Continuous Infusions:      Scheduled Meds:   warfarin  2.5 mg Oral Once    potassium phosphate IVPB  20 mmol Intravenous Once    levofloxacin  750 mg Oral Every Other Day    predniSONE  40 mg Oral Lunch    docusate sodium  100 mg Oral Daily    chlorhexidine  15 mL Mouth/Throat BID    lidocaine PF  5 mL Intradermal Once    heparin flush  3 mL Intravenous 2 times per day    warfarin (COUMADIN) daily dosing (placeholder)   Other RX Placeholder    ipratropium-albuterol  3 mL Inhalation Q4H WA    atorvastatin  10 mg Oral Daily    metoprolol tartrate  25 mg Oral BID    levothyroxine  75 mcg Oral Daily    budesonide  1 mg Nebulization BID    formoterol  20 mcg Nebulization Q12H       PRN Meds:sodium chloride flush, heparin flush, acetaminophen    Physical    VITALS:  BP (!) 127/52   Pulse 73   Temp 98.4 °F (36.9 °C) (Core)   Resp 28   Ht 5' (1.524 m)   Wt 100 lb 5 oz (45.5 kg)   SpO2 100%   BMI 19.59 kg/m²     24HR INTAKE/OUTPUT:      Intake/Output Summary (Last 24 hours) at 2019 1312  Last data filed at 2019 1200  Gross per 24 hour   Intake 803 ml   Output 1175 ml   Net -372 ml       24HR PULSE OXIMETRY RANGE:    SpO2  Av %  Min: 89 %  Max: 100 %    General appearance: alert, appears stated age and cooperative  Lungs: rhonchi bilaterally  Heart: regular rate and rhythm, S1, S2 normal, no murmur, click, rub or gallop  Abdomen: soft, non-tender; bowel sounds normal; no masses,  no organomegaly  Extremities: extremities normal, atraumatic, no cyanosis or edema  Neurologic: Mental status: awake, looks at me with conversation but minimal interaction, not looking in distress    Data    CBC:   Recent Labs     03/30/19  0620 03/31/19  0700 04/01/19  0500   WBC 10.7 9.3 12.0*   HGB 8.9* 9.5* 9.0*   HCT 28.6* 30.3* 29.6*   MCV 90.8 91.8 93.1    338 366       BMP:  Recent Labs     03/30/19  0620 03/31/19  0700 04/01/19  0500    145 143   K 3.7 3.5 3.9   CL 99 103 103   CO2 32* 30* 28   PHOS 2.1* 2.4* 2.3*   BUN 49* 45* 48*   CREATININE 0.9 0.8 0.9    ALB:3,BILIDIR:3,BILITOT:3,ALKPHOS:3)@    PT/INR:   Recent Labs     03/31/19  0520 03/31/19  0700 04/01/19  0500   PROTIME see note* 23.2* 28.3*   INR see note* 2.0 2.5       ABG:   Recent Labs     04/01/19  0500   PH 7.353   PO2 144.2*   PCO2 64.5*   HCO3 35.1*   BE 7.9*   O2SAT 98.8*   METHB 0.4   O2HB 97.8*   COHB 0.6   HHB 1.2   THB 10.2*     FiO2 : 40 %  I:E Ratio: 1:2.40    Radiology/Other tests reviewed: CXR reviewed looking similar to previous, haziness both lower lung fields    Assessment:     Principal Problem:    Acute on chronic respiratory failure with hypoxia and hypercapnia (HCC)  Active Problems:    Pulmonary hypertension (HCC)    Paroxysmal atrial fibrillation    Mixed hyperlipidemia    Pleural effusion    HTN (hypertension), benign    CAD (coronary artery disease), native coronary artery    Pneumonia    COPD (chronic obstructive pulmonary disease) (HCC)    Acquired hypothyroidism    Moderate protein-calorie malnutrition (HCC)  Resolved Problems:    COPD with acute exacerbation (HCC)    History of ST elevation myocardial infarction (STEMI)    CHB (complete heart block) (HCC)    PNA (pneumonia)    Respiratory failure ([facility] [address]      Plan:       1. Cont with BIPAP qhs and prn daytime, NC rest of the time, observe respiratory function  2. Cont with nebs  3. Steroid taper as tolerated  4. Possible flutter to see if helps with sputum clearance  5. Other issues as per MICU team        Thanks for letting us see this patient in consultation. Please contact us with any questions.  Office [phone] or after hours through [facility], x [phone].

## 2019-04-01 NOTE — PROGRESS NOTES
RASS:  0  CAM-ICU:  Positive   Sensation:  Pt denies numbness and tingling to extremities  Edema:  Unremarkable     Vitals:  Blood Pressure at rest 141/58 Blood Pressure post session 141/68   Heart Rate at rest 90 bpm Heart Rate post session 89 bpm   SPO2 at rest 100% on 3 L SPO2 post session 96% on 3 L   In chair 149/93    Functional Status Score-Intensive Care Unit (FSS-ICU)   Rolling 2/7   Supine to sit transfer 2/7   Unsupported sitting  2/7   Sit to stand transfers 2/7   Ambulation 0/7   Total  8/35     Patient education  Pt educated on safety during functional mobility, hand placement during bed mobility and transfers, sitting balance, sitting posture, standing balance, standing posture. Pt oriented to date, time, time of day, place, and situation as well as provided with visual and auditory stimuli in order to improve cognition and combat effects of ICU delirium. Patient response to education:   Pt verbalized understanding Pt demonstrated skill Pt requires further education in this area   Partial No  Yes      Comments:  Pt received supine and agreeable to PT treatment with OT collaboration. Pt cleared for participation by RN prior to session. Vitals monitored during session. Pt still having difficulty producing speech so was difficult to understand at times. Requires increased time for movement. Upon supine>sit pt was very rigid and retropulsive. Unable to get pt to correct sitting balance despite max cues. Transferred pt to bedside chair with dependent stand pivot. Pt requires max cues for safety and hand placement. Pt c/o mild dizziness. BP in chair assessed at 149/93. Lower extremities AROM in chair. Assisted with ADL's. Pt performed STS to [employer] with cues for hand placement and assist for upright. Pt unable to progress BLE to ambulate or to pivot back to bed. Pt with poor command following during attempted ambulation. Returned to sitting. Pt dependently pivoted back to bed. Assisted back to supine. Scooted up in bed. Pt placed in the chair position with pillows utilized to facilitate upright posture, joint and skin integrity, and interaction with environment. RN updated. RN in room upon exit. Lines attached, call button in reach, and needs met. PT would benefit from continued PT services. PLAN  Pt is making slow progress towards established goals. Continue PT POC.        Time in  0755  Time out  [address], PT, DPT  [id number]

## 2019-04-01 NOTE — PROGRESS NOTES
Pharmacy Consultation Note  (Warfarin Dosing and Monitoring)    Initial consult date: 3/24  Consulting physician: Minerva Valdes    Allergies:  Patient has no known allergies. 80 y.o. female    Ht Readings from Last 1 Encounters:   03/23/19 5' (1.524 m)     Wt Readings from Last 1 Encounters:   04/01/19 100 lb 5 oz (45.5 kg)         Warfarin Indication Target   INR Range Home Dose  (if applicable) Diet/Feeding Tube   (Enteral feeds, nutritional drinks and increased Vitamin K in diet can decrease INR)   atrial fibrillation   2-3 4 mg daily per nursing home records-warfarin resumed around 3/18. Held since Dec 2018 d/t ICH s/p fall Soft dental diet ordered 3/30           Vitamin K or Blood product  Administration Date                    TSH:    Lab Results   Component Value Date    TSH 4.620 06/15/2018        Hepatic Function Panel:                            Lab Results   Component Value Date    ALKPHOS 72 04/01/2019    ALT 34 04/01/2019    AST 30 04/01/2019    PROT 6.1 04/01/2019    BILITOT 0.4 04/01/2019    BILIDIR <0.2 09/19/2014    IBILI 0.5 09/19/2014    LABALBU 3.2 04/01/2019    LABALBU 4.5 03/09/2012       Date Warfarin Dose INR Heparin or LMWH HBG/HCT PLT Comment   3/22 Held 3.6 --- 12.1/40.8 231    3/23 Held 3.4 --- 9.8/33.3 184    3/24 Held --- --- 9.2/30.2 182    3/25 2.5 mg 2.4 --- 9.8/32.3 222    3/26 2.5 mg  1.8 --- 8.7/28.4 156    3/27 2.5 mg 2.0 --- 9/29 228    3/28 2.5 mg 2.5 --- 8.4/27 237    3/29 Held 2.7 --- 9.1/29.3 295    3/30  2.5 mg  2.5 ---  8.9/28.6  314    3/31 Not given on time 2 --- 9.5/30.3  338    4/1 3 mg @ 0204  2.5 mg 2.5 --- 9/29.6 366      Assessment:  · 80year old female who presented with fever/SOB from facility and is admitted for respiratory failure/pneumonia secondary to human metapneumovirus.    · Clinical pharmacist consulted to dose warfarin: indication: afib, home dose: warfarin 4 mg daily per nursing home records -warfarin resumed around 3/18 as outpatient, had been Held

## 2019-04-01 NOTE — PROGRESS NOTES
Palliative Care Department  Palliative Care Initial Consult  Provider: [address] [place] [address] Day: 11    Referring Provider:  Lucy Solitario MD    Reason for Consult:  [x]  Code status Discussion  [x]  Assist with goals of care  []  Psychosocial support  []  Symptom Management  []  Advanced Care Planning    Chief Complaint: Ronal Mcclellan is a 80 y.o. female with chief complaint of sob/pneumonia    Assessment/Plan      Active Hospital Problems    Diagnosis Date Noted    Acute on chronic respiratory failure with hypoxia and hypercapnia (HCC) [J96.21, J96.22] 03/25/2019    COPD (chronic obstructive pulmonary disease) ([facility] [address] [J44.9] 03/25/2019    Acquired hypothyroidism [E03.9] 03/25/2019    Moderate protein-calorie malnutrition ([facility] [address] [E44.0] 03/25/2019    Pneumonia [J18.9] 03/23/2019    Pleural effusion [J90] 01/11/2019    HTN (hypertension), benign [I10] 01/11/2019    CAD (coronary artery disease), native coronary artery [I25.10] 01/11/2019    Paroxysmal atrial fibrillation [I48.0]     Mixed hyperlipidemia [E78.2]     Pulmonary hypertension ([facility] [address] [I27.20] 05/02/2013   on antibiotics  Consults include pulmonary  Management per critical care team      Palliative Care Encounter/Recommendations:      - Goals of care: improve or maintain function/quality of life and continue current management     - Code Status: limited code- no to all     - pall med will sign off    Subjective:     HPI:  3/25/19  Ronal Mcclellan is a 80 y.o. female with significant past medical history of CAD, A fib on [address] [place] [address] Road who presented from [facility] with pneumonia. She ws having sob and chest x ray showed PNA. ECF started antibiotics however she did not improve. She was intubated in ED. Film array with human meta pneumovirus  and chest xray with right lung infiltrate. Subjective/Events/Discussions:  4/1/19  Patient has been extubated. She is alert. She has diet ordered. Has mild sob and productive cough.  D/C plan remains back ECF when stable. 3/29/19 Remains intubated. Opens eyes briefly. Still trying to wean from vent. Her sister and niece is at bedside. 3/27/19 Intubated, follows commands nods approprietly. Met with patient's niece. Patient lived with her until recent hospital stay. Plan will be back to ECF. Patient would not want long term support on vent or to be reintubated once extubated. We discussed resuscitation and she wants DNR CC. Will change to limited no to all for now. DNR formed filled out an copy given to niece. Encourage her to give copy to ECF. Despite intubation, patient participated in conversation as well. 3/25/19 Patient intubated and sedated. No family at bedside. Spoke with RN.     - Family Meeting:   Participants:No family meeting held today   Family meeting was held to discuss:no meeting today     ROS:     UNLESS STATED ABOVE PATIENT DENIES:  CONSTITUTIONAL:  fever, chill, rigors, nausea, vomiting, fatigue. RESPIRATORY: cough, shortness of breath, sputum expectoration. CARDIOVASCULAR:  Chest pain/pressure, palpitation, syncope, irregular beats  GASTROINTESTINAL:  abdominal or rectal pain, diarrhea, constipation, .   MUSCULOSKELETAL:  pain, edema,       Objective:     Physical Exam  /72   Pulse 75   Temp 98.2 °F (36.8 °C) (Core)   Resp 26   Ht 5' (1.524 m)   Wt 100 lb 5 oz (45.5 kg)   SpO2 98%   BMI 19.59 kg/m²     Gen:   appears stated age, well nourished, in no acute distress  HEENT:  Normocephalic, conjunctiva pink, no drainage, mucosa moist  Neck:  Supple  Lungs:  rhonchi bilaterally, no audiblewheezes noted  Heart[de-identified]  RRR, no murmur, rub, or gallop noted during exam  Abd:  Soft, non tender, non distended, BS+  Ext:  Moving all extremities, no edema, pulses present  Skin:  Warm and dry  Neuro:  Alert and oriented      Current Medications:  Inpatient/Home medications reviewed:    Results/Verification of Data Review  Objective data reviewed: labs, images, records, medication use, vitals and chart      - Advanced Directives: Living Will and HC-POA   -Surrogate/Legal NOK: Niece    Contacts: Niece [phone] Yoav Caceres [phone], River Mckeon sister [phone],  - Spiritual assessment: No spiritual distress identified     - Bereavement and grief: to be determined    - Discharge planning: discharge to ECF    - Prognosis: unknown    - Referrals to: none today    Time/Communication  Greater than 51% of time spent, total 15 minutes in counseling and coordination of care at the bedside regarding goals of care, diagnosis and prognosis and see above. Curt RAMIREZ-CNP  Palliative Medicine    Discussed patient and the plan of care with the other IDT members of Palliative Med team and with patient. Thank you for allowing Palliative Medicine to participate in the care of Saige Obrien. Note: This report was completed using [employer] voiced recognition software. Every effort has been made to ensure accuracy; however, inadvertent computerized transcription errors may be present.

## 2019-04-01 NOTE — PROGRESS NOTES
s/p fall  · INR today=2; goal INR 2-3    Plan:  · Warfarin 3 mg tonight  · Daily PT/INR until the INR is stable within the therapeutic range  · Pharmacist will follow and monitor/adjust dosing as necessary    Thank you for this consult.     Nicole Jonas, PharmD, BCPS, BCCCP 3/31/2019 11:48 PM

## 2019-04-01 NOTE — PROGRESS NOTES
ID Progress Note                [address] [facility] [address], [place], [address]            Phone [phone]     Fax [phone]      Chief complaint    Fever, weakness, shortness of breath  On BIPAP      Subjective:  Awake. Still coughing  confused, Afebrile. Objective:    Vitals:    04/01/19 1001   BP:    Pulse:    Resp: 28   Temp:    SpO2: 100%     GENERAL:  The patient is awake, agitated,BIPAP  HEENT:  Atraumatic, normocephalic. PERRLA. EOMI. RESPIRATORY:  Air entry bilaterally equal.  No wheezes or crackles. CARDIOVASCULAR:  S1 and S2 normal.  Tachycardiac. ABDOMEN:  Soft. Nontender, nondistended. Bowel sounds present. EXTREMITIES:  No pedal edema. NEUROLOGIC:  Grossly intact. LINES:  She has right IJ in place in the peripheral line. Palomo  catheter in place. Labs:  Recent Labs     03/30/19  0620 03/31/19  0700 04/01/19  0500   WBC 10.7 9.3 12.0*   RBC 3.15* 3.30* 3.18*   HGB 8.9* 9.5* 9.0*   HCT 28.6* 30.3* 29.6*   MCV 90.8 91.8 93.1   MCH 28.3 28.8 28.3   MCHC 31.1* 31.4* 30.4*   RDW 17.8* 17.4* 17.1*    338 366   MPV 10.0 9.6 9.7     CMP:    Lab Results   Component Value Date     04/01/2019    K 3.9 04/01/2019    K 4.1 03/24/2019     04/01/2019    CO2 28 04/01/2019    BUN 48 04/01/2019    CREATININE 0.9 04/01/2019    GFRAA >60 04/01/2019    LABGLOM 60 04/01/2019    GLUCOSE 89 04/01/2019    GLUCOSE 95 03/09/2012    PROT 6.1 04/01/2019    LABALBU 3.2 04/01/2019    LABALBU 4.5 03/09/2012    CALCIUM 8.6 04/01/2019    BILITOT 0.4 04/01/2019    ALKPHOS 72 04/01/2019    AST 30 04/01/2019    ALT 34 04/01/2019          Microbiology :  resp cx- pseudomonas    ASSESSMENT AND PLAN:  1. Nosocomial pneumonia, pseudomonas pneumonia   so far no MRSA isolated from the lungs. 2.  Metapneumovirus viral infection. 3.  History of COPD/past history of smoking. Recent right-sided pleural  effusion transudative, unknown etiology.   4.  Coronary artery disease status post stent/AFib/supratherapeutic INR  that is normalized now.   5.  History of subdural hematoma.     PLAN:  D/C  cefepime q12  levaquin for 5 more days to complete 14 day course        Electronically signed by Stephanie Morillo MD on 4/1/2019 at 10:31 AM

## 2019-04-01 NOTE — PROGRESS NOTES
Date: 3/31/2019    Time: 10:52 PM    Patient Placed On BIPAP/CPAP/ Non-Invasive Ventilation? Yes    If no must comment. Facial area red/color change? Yes   Slightly red, mepilex in place        If YES are Blister/Lesion present? No   If yes must notify nursing staff  BIPAP/CPAP skin barrier?   Yes    Skin barrier type:mepilex       Comments:        [employer]

## 2019-04-02 NOTE — PROGRESS NOTES
[address] [facility]  Department of Internal Medicine   Internal Medicine Residency   MICU Progress Note    Patient:  Chester Santos 80 y.o. female  MRN: [id number]     Date of Service: 4/1/2019    Allergy: Patient has no known allergies. CC follow up resp failure   Subjective     Patient seen and examined this morning  - on NC 3L oxygen this morning, reports feeling tired, no complains   - ABG still showing CO2 retention, AVAP as needed    - will transfer out        Objective     VS: BP (!) 106/54   Pulse 90   Temp 99.1 °F (37.3 °C) (Core)   Resp 24   Ht 5' (1.524 m)   Wt 100 lb 5 oz (45.5 kg)   SpO2 100%   BMI 19.59 kg/m²         I & O - 24hr:     Intake/Output Summary (Last 24 hours) at 4/1/2019 2338  Last data filed at 4/1/2019 2200  Gross per 24 hour   Intake 1629 ml   Output 1055 ml   Net 574 ml     Physical Exam:  · General Appearance: on NC 3L oxygen this morning, no resp distress  · Neck: no adenopathy, no carotid bruit, no JVD, supple, symmetrical, trachea midline and thyroid not enlarged, symmetric, no tenderness/mass/nodules  · Lung: diminished breath sounds bilaterally  · Heart: regular rate and rhythm, S1, S2 normal, no murmur, click, rub or gallop  · Abdomen: soft, non-tender; bowel sounds normal; no masses,  no organomegaly  · Extremities:  extremities normal, atraumatic, no cyanosis or edema  · Musculoskeletal: No joint swelling, no muscle tenderness. ROM normal in all joints of extremities.    · Neurologic:  Alert, awake and oriented   ·   Lines     site day    Art line   None    TLC R IJ Removed    PICC None    Hemoaccess None            ABG:     Lab Results   Component Value Date    PH 7.353 04/01/2019    PCO2 64.5 04/01/2019    PO2 144.2 04/01/2019    HCO3 35.1 04/01/2019    BE 7.9 04/01/2019    THB 10.2 04/01/2019    O2SAT 98.8 04/01/2019        Medications       ATB:   Antibiotics  Days   zyvox 03/25 stopped   cefepime 03/23         Skin issues:   Patient currently has Urinary cath  Isolation  Restraints  DVT prophylaxis/ GI prophylaxis,    PT/OT  SNF/NH placement  Palliative care consult   Labs     CBC with Differential:    Lab Results   Component Value Date    WBC 12.0 04/01/2019    RBC 3.18 04/01/2019    HGB 9.0 04/01/2019    HCT 29.6 04/01/2019     04/01/2019    MCV 93.1 04/01/2019    MCH 28.3 04/01/2019    MCHC 30.4 04/01/2019    RDW 17.1 04/01/2019    NRBC 0.9 03/27/2019    SEGSPCT 73 12/02/2011    BANDSPCT 27 09/20/2014    METASPCT 6 09/20/2014    LYMPHOPCT 2.6 04/01/2019    MONOPCT 7.8 04/01/2019    MYELOPCT 1.7 04/01/2019    BASOPCT 0.2 04/01/2019    MONOSABS 0.96 04/01/2019    LYMPHSABS 0.36 04/01/2019    EOSABS 0.00 04/01/2019    BASOSABS 0.00 04/01/2019     CMP:    Lab Results   Component Value Date     04/01/2019    K 4.9 04/01/2019    K 4.1 03/24/2019     04/01/2019    CO2 31 04/01/2019    BUN 43 04/01/2019    CREATININE 1.0 04/01/2019    GFRAA >60 04/01/2019    LABGLOM 53 04/01/2019    GLUCOSE 149 04/01/2019    GLUCOSE 95 03/09/2012    PROT 6.1 04/01/2019    LABALBU 3.2 04/01/2019    LABALBU 4.5 03/09/2012    CALCIUM 9.1 04/01/2019    BILITOT 0.4 04/01/2019    ALKPHOS 72 04/01/2019    AST 30 04/01/2019    ALT 34 04/01/2019     Magnesium:    Lab Results   Component Value Date    MG 2.1 04/01/2019     Phosphorus:    Lab Results   Component Value Date    PHOS 2.3 04/01/2019     PT/INR:    Lab Results   Component Value Date    PROTIME 28.3 04/01/2019    INR 2.5 04/01/2019     U/A:    Lab Results   Component Value Date    COLORU Yellow 03/23/2019    PROTEINU 30 03/23/2019    PHUR 5.5 03/23/2019    LABCAST RARE 03/23/2019    WBCUA NONE 03/23/2019    RBCUA 1-3 03/23/2019    BACTERIA NONE 03/23/2019    CLARITYU Clear 03/23/2019    SPECGRAV 1.025 03/23/2019    LEUKOCYTESUR Negative 03/23/2019    UROBILINOGEN 0.2 03/23/2019    BILIRUBINUR Negative 03/23/2019    BLOODU Negative 03/23/2019    GLUCOSEU Negative 03/23/2019     ABG:    Lab Results   Component Value Date    PH 7.353 04/01/2019    PCO2 64.5 04/01/2019    PO2 144.2 04/01/2019    HCO3 35.1 04/01/2019    BE 7.9 04/01/2019    O2SAT 98.8 04/01/2019       Imaging Studies:  CXR:     Impression   Cardiomegaly   Findings compatible with atherosclerotic disease of the aorta. There is a right perihilar infiltrate improved in the interval. Right   upper lobe infiltrate has improved suggesting resolving atelectasis               Resident's Assessment and Plan     Pulmonary   Acute hypoxic hypercapnic respiratory failure  - likely 2/2 COPD (home o2) exacerbation from metapneumo virus   - initial ABGs: 7.206/81/149.8/31.4  - extubated on BiPAP  - CXR: R LL atelectasis, improving   - Respiratory viral panel positive for metapneumo virus  - Hx of pseudomonas infection June 2018.    - Follow resp culture, procalcitonin 0.75 and deescalate as needed  - Solumedrol discontinued, wean down to prednisone 40 mg daily for 5 days  - breathing treatment: duoneb,budesonide and perforomist   - On cefepime  - monitor resp status    Cardiovascular   A Fib   Warfarin doing by pharmacy   Lopressor 25 mg BID      Elevated troponin  - troponin 0.04  - EKG: Sinus tachycardia, no ST-T change  - Hx of CAD     HLD  - on lipitor    Renal  Hypernatremia, resolved  - Decrease free water bolus to 100 cc q6hr  - Na - [phone]  - monitor BMP    Hematology   Supratherapeutic INR, resolved   - now subtherapeutic.   - INR 3.6-3.4-2.4-2.0-2.5-2.0  - was on warfarin 4mg, started with 2.5 mg, pharmacy following with dosing   - monitor daily INR     GI  GERD  on protonix  On TF     Endocrine  Hypothyroidism  - on synthroid 75     PT/OT evaluation:yes  DVT prophylaxis/ GI prophylaxis: warfarin/ protonix  Disposition: JODIE Fine MD, PGY-1    Attending physician: Dr. Chris Ordonez  Department of Internal Medicine  Division of Pulmonary, Critical Care & Sleep Medicine  ICU Attending    This patient has a high probability of sudden clinically significant deterioration, which requires the highest level of physician preparedness to intervene urgently. I managed/supervised life or organ supporting interventions that required frequent physician assessment. I devoted my full attention to the direct care of this patient for the period of time indicated below. Time I spent with family of surrogate(s) is included only if the patient was incapable of providing the necessary information or participating in medical decision making. Time devoted to teaching and to any procedure. CCT 41 minutes    Acute MI with RV physiology      In addition Jaquelin Sings was seen and examined. All imaging was independently reviewed. Patient discussed during comprehensive ICU rounds. Medications and testing was reviewed. Above findings corroborated, plan discussed and where needed changes made.

## 2019-04-02 NOTE — CONSULTS
Inpatient Cardiology Consultation      Reason for Consult:  STEMI    Consulting Physician: Dr Boone Guzman    Requesting Physician:  Dr Cooper Meade    Date of Consultation: 4/2/2019    HISTORY OF PRESENT ILLNESS: 79 yo  female known to Dr Ana María Enrique last seen in the office 1/23/2019. PMH: HTN, HLD, hypothyroidism, CAD s/p PCI, CHB s/p PPM, PAF on coumadin, VHD, PHTN, COPD on chronic O2, ex smoker, anemia,R thoracentesis,  and L parietal SDH secondary to fall 12/2018. [facility] ED 3/22/2019 with SOB and fever (hypoxic per EMS). Respiratory status worsened in ED requiring intubation. + meta-pneumovirus  3/31/2019 Extubated  4/1/2019 2032 bradycardiac HR 45. She is oriented only to self but stated she have some chest pressure. She was also diaphoretic. She was put on BiPAP. STAT EKG showed ST elevtaed in lead II,III and AVF and also V4,V5,V6. Troponin 0.09. Pt given 1 mg of morphine and aspirin 325 mg. BP was low, 500 cc bolus ordered. Spoke with niece (POA) no aggressive measures. Please note: past medical records were reviewed per electronic medical record (EMR) - see detailed reports under Past Medical/ Surgical History. Past Medical/Surgical History:    1. Ex smoker 1 ppd x 40 years quit 1996  2. COPD on chronic O2  3. HTN  4. HLD  5. Hypothyroidism on replacement therapy  6. Anxiety  7. 1/12/2004 Inferior STEMI  8. 1/12/2004 [facility] Dr Kenneth Rivera  in setting of recurrent inferior myocardial infarction and complete A-V block and cardiogenic shock revealing evidence of subtotal subacute thrombosis of proximal RCA stent with KRISTIN grade 0 NRCA, successful dilatation of suboptimally deployed stent with good dilatation, Angio-Jet thrombectomy, placement of additional 3 stents proximally to seal a proximal dissection and restoration of KRISTIN grade 3 flow, temporary transvenous pacemaker via the right common femoral vein. 9. 1/19/2004 [facility] subtotal RCA, partially successful PTCA/ stent to RCA.   10. 1/24/2004 [facility] ([facility]) Dr Danyell Espinosa: failed attempt to insert atrial lead in the past (adequate thresholds could  not obtained, trying 2 different electrodes and, therefore, the atrial lead was removed and the pulse generator atrial port was cancelled with an indifferent short electrode block. 11. 4/4/2012 Lexiscan MPS Normal perfusion study with a probable soft tissue breast attenuation artifact, well-preserved systolic left ventricular function with an ejection fraction over 80%. 12. Paroxysmal Atrial Fibrillation noted during admission 4/2013 seen by Dr Brianne Villar. Patient started on coumadin  13. 5/2013 TTE Normal left ventricular size, wall thickness, wall motion, and systolic function. EF estimated at 64%. Stage I DD. Moderate to severe MR  14. Physiologic and/or trace tricuspid regurgitation. Moderate to severe PHTN   15. Appendectomy, hemorrhoidectomy, L and R total hip arthroplasty in 2005  16. Varicose veins  17. Hemoptysis and LLL Pneumonia s/p Bronchoscopy 9/23/2014: Left lower lobe basilar and superior segments uptill 4th generation bronchi: Collapse with bloody mucus. 18. 2/18/2016 Seen by Dr Howard Late outpatient: She paces ~3-4% at a lower rate of VVI 50 bpm, and appears to have recovered AV conduction. My initial recommendation was for her to either do nothing or to undergo lead extraction. Patient opted to allow battery to run out. EOL since 3/2016  19. TTE 3/2016 Dr Lul Larry: EF 65%. Stage II DD. Normal RV size and function. Moderate to severe eccentric MR. Moderate TR. RVSP 58 mmHg. Moderate PHTN.   20. 4/2016 Lexiscan MPS 2 mm ST depression in inferior leads and early recovery. Went into AFL asymptomatic. Normal myocardial perfusion study with no evidence of reversible ischemia. Small areas os fixed anterior and apical defects. EF 70%. 21. 24 hour Holter 4/2016 SR brief run of SVT. No AF. Frequent PAC's and PVC's  22. 5/28/2017 Fall no LOC. No fractures noted.    23. 6/30/2018 + parainfluenza discharged MD   polyethylene glycol (GLYCOLAX) packet Take 17 g by mouth daily   Yes Historical Provider, MD   Cholecalciferol (VITAMIN D3) 5000 units TABS Take 1 tablet by mouth daily   Yes Historical Provider, MD   warfarin (COUMADIN) 4 MG tablet Take 4 mg by mouth daily 3/20/19  Yes Historical Provider, MD   dronabinol (MARINOL) 2.5 MG capsule Take 2.5 mg by mouth 2 times daily (before meals). Yes Historical Provider, MD   Nutritional Supplements (ENSURE PLUS) LIQD Take 237 mLs by mouth 3 times daily   Yes Historical Provider, MD   ipratropium-albuterol (DUONEB) 0.5-2.5 (3) MG/3ML SOLN nebulizer solution Inhale 3 mLs into the lungs every 4 hours (while awake) 1/15/19  Yes Shabana Caceres MD   Fluticasone-Umeclidin-Vilant (TRELEGY ELLIPTA) 244-03.7-85 MCG/INH AEPB Inhale 1 actuation into the lungs daily    Yes Historical Provider, MD   acetaminophen (TYLENOL) 500 MG tablet Take 500 mg by mouth every 6 hours as needed for Pain   Yes Historical Provider, MD   atorvastatin (LIPITOR) 10 MG tablet TAKE 1 TABLET BY MOUTH EVERY DAY 11/29/18  Yes Johnny Ni MD   metoprolol tartrate (LOPRESSOR) 50 MG tablet Take 1 tablet by mouth 2 times daily 9/4/18  Yes Johnny Ni MD   ascorbic acid (VITAMIN C) 500 MG tablet Take 500 mg by mouth daily. Yes Historical Provider, MD   calcium-vitamin D (OSCAL-500) 500-200 MG-UNIT per tablet Take 1 tablet by mouth daily. Yes Historical Provider, MD   multivitamin [facility]) per tablet Take 1 tablet by mouth daily.    Yes Historical Provider, MD       Current Medications:    Current Facility-Administered Medications: nystatin (MYCOSTATIN) ointment, , Topical, BID  potassium chloride 10 MEQ/100ML IVPB (Peripheral Line), , ,   levofloxacin (LEVAQUIN) tablet 750 mg, 750 mg, Oral, Every Other Day  predniSONE (DELTASONE) tablet 40 mg, 40 mg, Oral, Lunch  docusate sodium (COLACE) 150 MG/15ML liquid 100 mg, 100 mg, Oral, Daily  lidocaine PF 1 % injection 5 mL, 5 mL, Intradermal, Once  sodium extremity edema. + varicosities. Pedal pulses palpable, no clubbing or cyanosis   ABDOMEN: Soft, non-tender to light palpation. Bowel sounds present. No palpable masses; no abdominal bruit  MS: Good muscle strength and tone. No atrophy or abnormal movements. : Deferred  SKIN: Warm and dry no statis dermatitis or ulcers   NEURO / PSYCH: Patient shaking head yes and no but is not verbalizing anything while on Bi-pap. Follows simple commands. Flat affect     DATA:    ECG  As per Dr Manisha Mak interpretation  Tele strips: AF 's    Diagnostic:  CXR 3/22/2019: RLL infiltrate with small R pleural effusion    CXR 4/1/2019: No evidence of airspace consolidation or pulmonary venous congestion. Chronic obstructive pleural disease    Intake/Output Summary (Last 24 hours) at 4/2/2019 0715  Last data filed at 4/2/2019 0542  Gross per 24 hour   Intake 1654 ml   Output 925 ml   Net 729 ml       Labs:   CBC:   Recent Labs     04/01/19 0500 04/02/19  0440   WBC 12.0* 13.6*   HGB 9.0* 8.5*   HCT 29.6* 28.3*    316     BMP:   Recent Labs     04/01/19 2036 04/02/19  0440    144   K 4.9 4.3   CO2 31* 28   BUN 43* 40*   CREATININE 1.0 1.0   LABGLOM 53 53   CALCIUM 9.1 8.5*     Mag:   Recent Labs     04/01/19 2036 04/02/19  0440   MG 2.1 1.8     Phos:   Recent Labs     04/01/19 0500 04/02/19  0440   PHOS 2.3* 2.5     PT/INR:   Recent Labs     04/01/19  0500 04/02/19  0440   PROTIME 28.3* 55.2*   INR 2.5 4.9     CARDIAC ENZYMES:  Recent Labs     04/01/19 2036   TROPONINI 0.09*       LIVER PROFILE:  Recent Labs     04/01/19 0500 04/02/19  0440   AST 30 59*   ALT 34* 72*   LABALBU 3.2* 2.9*   A&P per Dr Manisha Mak  Electronically signed by Jose García. IFEMOA Narvaez on 4/2/2019 at 7:15 AM     Reason for consult:  STEMI    Patient seen with Pablo Dorantes. Agree with the findings and A/P. Management plan was discussed.  I have personally interviewed the patient, independently performed a focused cardiac exam, reviewed the pertinent laboratory and diagnostic testing results and directly participated in the medical decision-making. HPI: 80yr old female with h/o HTN, HLD, hypothyroidism, CAD s/p PCI, CHB s/p PPM, PAF on coumadin, VHD, PHTN, COPD on chronic O2, ex smoker, anemia,R thoracentesis,  and L parietal SDH secondary to fall 12/2018  Admitted on 3/31 with hypoxic respiratory failure requiring intubation. Found to have + meta-pneumovirus. She was extubated and transferred to the monitored floor. Last evening she was noted to be confused, bradycardic and noted some CP. EKG revealed CHRISTA in inferior leads. Family did not want any aggressive measures. She was transferred to the MICU for further observation. Presently she complains of mild chest pressure. SOB about the same. Reviewed the PMH, social history, FH and ROS from APRN note. Agree with the findings. See the full consult note for details. PE:   /64   Pulse 87   Temp 97.3 °F (36.3 °C) (Temporal)   Resp (!) 31   Ht 5' (1.524 m)   Wt 103 lb 9.9 oz (47 kg)   SpO2 98%   BMI 20.24 kg/m²   CONST: In general, this is a well developed, elderly female who appears of stated age. Throat: Oral mucosa pink and moist.  HEENT: Head- normocephalic, atraumatic; Eyes:conjunctivae pink, no noted xanthomas. Neck: no stridor, no noted enlargement of the thyroid,symmetric, no tenderness, no jugular venous distention. No carotid bruit noted. CHEST: Symmetrical and non-tender to palpation. No noted accessory muscle use or intercostal retractions. LUNGS: coarse breathe sounds, diminished AE b/l; few basal creps; diffuse ronchi. CARDIOVASCULAR:   Heart Inspection shows no noted pulsations  Heart Palpation: no heaves or thrills, PMI in 5th ISC near left midclavicular line   Heart Ausculation -Normal S1 and S2, IRRR, no murmur, s3, s4 or rub noted. PV: no extremity edema. No varicosities.  Pedal pulses palpable, no clubbing or cyanosis   ABDOMEN: Soft, non-tender 12/2018      Plan:  1. Medical therapy for ACS: ASA, BB  2. Add nitrates if BP allows  3. Rx of her respiratory status as per ICU team  4. ECHO has been ordered: will review. Will optimize medical therapy based on EF and BP.   5. In light of the family wishes, no advanced cardiac work up planned. 6. No heparin for now in light of supratherapeutic INR. Hold coumadin for now. Thank you for the consult. Will follow. Dr. Lora Dawn MD.  [facility].

## 2019-04-02 NOTE — PLAN OF CARE
Problem: Falls - Risk of:  Goal: Will remain free from falls  Description  Will remain free from falls  Outcome: Met This Shift  Goal: Absence of physical injury  Description  Absence of physical injury  Outcome: Met This Shift     Problem: Risk for Impaired Skin Integrity  Goal: Tissue integrity - skin and mucous membranes  Description  Structural intactness and normal physiological function of skin and  mucous membranes. Outcome: Met This Shift     Problem: Anxiety/Stress:  Goal: Level of anxiety will decrease  Description  Level of anxiety will decrease  Outcome: Met This Shift     Problem: Skin Integrity - Impaired:  Goal: Will show no infection signs and symptoms  Description  Will show no infection signs and symptoms  Outcome: Met This Shift  Goal: Absence of new skin breakdown  Description  Absence of new skin breakdown  Outcome: Met This Shift    Care Plan discussed with patient's family.

## 2019-04-02 NOTE — PROGRESS NOTES
Date: 4/2/2019    Time: 12:12 AM    Patient Placed On BIPAP/CPAP/ Non-Invasive Ventilation? Pt already on bipap    If no must comment. Facial area red/color change? Yes           If YES are Blister/Lesion present? Yes   If yes must notify nursing staff  BIPAP/CPAP skin barrier? Yes    Skin barrier type:mepilex       Comments:  Rn already notified on prior shift of pts sore on her nose.       Lena Friend

## 2019-04-02 NOTE — PROGRESS NOTES
Subjective:    Awake alert. Extubated (3/30/2019). Follows commands. Breathing better. Using NIPPV. Tolerating diet. Offers no complaints. Issues with atrial fibrillation and EKG abnormalities yesterday. Objective:    /64   Pulse 120   Temp 98.1 °F (36.7 °C) (Core)   Resp 18   Ht 5' (1.524 m)   Wt 103 lb 9.9 oz (47 kg)   SpO2 99%   BMI 20.24 kg/m²     Current medications that patient is taking have been reviewed. Heart:  RRR, no murmurs, gallops, or rubs.   Lungs:  Decreased breath sounds bilaterally  Abd: bowel sounds present, soft, nontender, nondistended, no masses  Extrem:  No cyanosis or edema    CBC with Differential:    Lab Results   Component Value Date    WBC 13.6 04/02/2019    RBC 3.00 04/02/2019    HGB 8.5 04/02/2019    HCT 28.3 04/02/2019     04/02/2019    MCV 94.3 04/02/2019    MCH 28.3 04/02/2019    MCHC 30.0 04/02/2019    RDW 17.2 04/02/2019    NRBC 0.9 03/27/2019    SEGSPCT 73 12/02/2011    BANDSPCT 27 09/20/2014    METASPCT 6 09/20/2014    LYMPHOPCT 4.3 04/02/2019    MONOPCT 1.7 04/02/2019    MYELOPCT 1.7 04/01/2019    BASOPCT 0.1 04/02/2019    MONOSABS 0.27 04/02/2019    LYMPHSABS 0.54 04/02/2019    EOSABS 0.00 04/02/2019    BASOSABS 0.00 04/02/2019     CMP:    Lab Results   Component Value Date     04/02/2019    K 4.3 04/02/2019    K 4.1 03/24/2019     04/02/2019    CO2 28 04/02/2019    BUN 40 04/02/2019    CREATININE 1.0 04/02/2019    GFRAA >60 04/02/2019    LABGLOM 53 04/02/2019    GLUCOSE 90 04/02/2019    GLUCOSE 95 03/09/2012    PROT 5.6 04/02/2019    LABALBU 2.9 04/02/2019    LABALBU 4.5 03/09/2012    CALCIUM 8.5 04/02/2019    BILITOT 0.4 04/02/2019    ALKPHOS 94 04/02/2019    AST 59 04/02/2019    ALT 72 04/02/2019     BMP:    Lab Results   Component Value Date     04/02/2019    K 4.3 04/02/2019    K 4.1 03/24/2019     04/02/2019    CO2 28 04/02/2019    BUN 40 04/02/2019    LABALBU 2.9 04/02/2019    LABALBU 4.5 03/09/2012    CREATININE 1.0 04/02/2019    CALCIUM 8.5 04/02/2019    GFRAA >60 04/02/2019    LABGLOM 53 04/02/2019    GLUCOSE 90 04/02/2019    GLUCOSE 95 03/09/2012     Magnesium:    Lab Results   Component Value Date    MG 1.8 04/02/2019     Phosphorus:    Lab Results   Component Value Date    PHOS 2.5 04/02/2019     PT/INR:    Lab Results   Component Value Date    PROTIME 55.2 04/02/2019    INR 4.9 04/02/2019     PTT:    Lab Results   Component Value Date    APTT 45.9 09/19/2014   [APTT}  ABG:    Lab Results   Component Value Date    PH 7.353 04/01/2019    PCO2 64.5 04/01/2019    PO2 144.2 04/01/2019    HCO3 35.1 04/01/2019    BE 7.9 04/01/2019    O2SAT 98.8 04/01/2019        Assessment:    Patient Active Problem List   Diagnosis    Pulmonary hypertension (HCC)    Paroxysmal atrial fibrillation    Mixed hyperlipidemia    Pleural effusion    HTN (hypertension), benign    CAD (coronary artery disease), native coronary artery    Pneumonia due to organism    Acute on chronic respiratory failure with hypoxia and hypercapnia (HCC)    COPD (chronic obstructive pulmonary disease) ([facility]    Acquired hypothyroidism    Moderate protein-calorie malnutrition (HCC)    Acute electrocardiogram changes    Chest pain due to myocardial ischemia    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    Anticoagulated on Coumadin       Plan:  Stable. Rate controlled. INR therapeutic. Cardiology consulted. Continue aggressive lipid therapy  Stable. Blood pressure ok, continue current medications  Continue medical management of ASCAD. Possible acute bacterial pneumonia on top of metapneumo virus infection. ID on board. On cefepime  Secondary to COPD exacerbation, pneumonia and human metapneumovirus infection. Extubated. Continue aggressive pulmonary hygiene. Continue breathing treatments. Continue synthroid. Pt/Ot evaluations for discharge planning. .    Kareen Lynn    9:41 AM  4/2/2019

## 2019-04-02 NOTE — PROGRESS NOTES
[facility] Quality Flow/Interdisciplinary Rounds Progress Note        Quality Flow Rounds held on April 2, 2019    Disciplines Attending:  Bedside Nurse, Charge nurse, JONATHAN, OT, PT, , and . Rusty King was admitted on 3/22/2019 10:19 PM    Anticipated Discharge Date:  Expected Discharge Date: 04/06/19    Disposition:    David Score:  David Scale Score: 14    Readmission Risk              Risk of Unplanned Readmission:        31           Discussed patient goal for the day, patient clinical progression, and barriers to discharge. The following Goal(s) of the Day/Commitment(s) have been identified:  Consult iv team, SLP eval and ECHO complete.  Labs discussed, vital signs assessed and monitor EKGs      Kamilla Acosta  April 2, 2019

## 2019-04-02 NOTE — PROGRESS NOTES
[facility]  [address], [address]  [facility], [address] [place] [address]      Pulmonary Progress Note      SUBJECTIVE:  On NC, RRT and chest pain with EKG changes yesterday, medical management only it appears, stable with respiratory function with similar cough and not much sputum production.     OBJECTIVE    Medications    Continuous Infusions:      Scheduled Meds:   nystatin   Topical BID    potassium chloride        aspirin  81 mg Oral Daily    [START ON 4/3/2019] docusate sodium  100 mg Oral Daily    levofloxacin  750 mg Oral Every Other Day    predniSONE  40 mg Oral Lunch    heparin flush  3 mL Intravenous 2 times per day    warfarin (COUMADIN) daily dosing (placeholder)   Other RX Placeholder    ipratropium-albuterol  3 mL Inhalation Q4H WA    atorvastatin  10 mg Oral Daily    metoprolol tartrate  25 mg Oral BID    levothyroxine  75 mcg Oral Daily    budesonide  1 mg Nebulization BID    formoterol  20 mcg Nebulization Q12H       PRN Meds:sodium chloride flush, heparin flush, acetaminophen    Physical    VITALS:  /69   Pulse 110   Temp 98.4 °F (36.9 °C) (Core)   Resp 23   Ht 5' (1.524 m)   Wt 103 lb 9.9 oz (47 kg)   SpO2 98%   BMI 20.24 kg/m²     24HR INTAKE/OUTPUT:      Intake/Output Summary (Last 24 hours) at 2019 1658  Last data filed at 2019 1400  Gross per 24 hour   Intake 826 ml   Output 955 ml   Net -129 ml       24HR PULSE OXIMETRY RANGE:    SpO2  Av.7 %  Min: 88 %  Max: 100 %    General appearance: alert, appears stated age and cooperative  Lungs: rhonchi bilaterally  Heart: regular rate and rhythm, S1, S2 normal, no murmur, click, rub or gallop  Abdomen: soft, non-tender; bowel sounds normal; no masses,  no organomegaly  Extremities: extremities normal, atraumatic, no cyanosis or edema  Neurologic: Mental status: awake, looks at me with conversation but minimal interaction, not looking in distress    Data CBC:   Recent Labs     03/31/19  0700 04/01/19  0500 04/02/19  0440   WBC 9.3 12.0* 13.6*   HGB 9.5* 9.0* 8.5*   HCT 30.3* 29.6* 28.3*   MCV 91.8 93.1 94.3    366 316       BMP:  Recent Labs     03/31/19  0700 04/01/19  0500 04/01/19 2036 04/02/19  0440    143 144 144   K 3.5 3.9 4.9 4.3    103 100 105   CO2 30* 28 31* 28   PHOS 2.4* 2.3*  --  2.5   BUN 45* 48* 43* 40*   CREATININE 0.8 0.9 1.0 1.0    ALB:3,BILIDIR:3,BILITOT:3,ALKPHOS:3)@    PT/INR:   Recent Labs     03/31/19  0700 04/01/19  0500 04/02/19  0440   PROTIME 23.2* 28.3* 55.2*   INR 2.0 2.5 4.9       ABG:   Recent Labs     04/01/19  0500   PH 7.353   PO2 144.2*   PCO2 64.5*   HCO3 35.1*   BE 7.9*   O2SAT 98.8*   METHB 0.4   O2HB 97.8*   COHB 0.6   HHB 1.2   THB 10.2*     FiO2 : 40 %  I:E Ratio: 1:2.40    Radiology/Other tests reviewed: CXR reviewed with slightly increased haziness right lower lung fields    Assessment:     Principal Problem:    Acute on chronic respiratory failure with hypoxia and hypercapnia (HCC)  Active Problems:    Pulmonary hypertension (HCC)    Paroxysmal atrial fibrillation    Mixed hyperlipidemia    Pleural effusion    HTN (hypertension), benign    CAD in native artery    Pneumonia due to organism    COPD (chronic obstructive pulmonary disease) (HCC)    Acquired hypothyroidism    Moderate protein-calorie malnutrition (HCC)    Acute electrocardiogram changes    Chest pain due to myocardial ischemia    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    Anticoagulated on Coumadin    ST elevation myocardial infarction (STEMI) ([facility] [address]  Resolved Problems:    COPD with acute exacerbation (HCC)    History of ST elevation myocardial infarction (STEMI)    CHB (complete heart block) (HCC)    PNA (pneumonia)    Respiratory failure ([facility] [address]      Plan:       1. Cont with BIPAP qhs and prn daytime, NC rest of the time, observe respiratory function  2. Cont with nebs, observe sputum production  3. Steroid taper as tolerated  4.  Other issues as per MICU team        Thanks for letting us see this patient in consultation. Please contact us with any questions. Office [phone] or after hours through [facility], [phone].

## 2019-04-02 NOTE — PROGRESS NOTES
OCCUPATIONAL THERAPY TREATMENT NOTE    Date:2019  Patient Name: Ronal Mcclellan  MRN: [id number]  : 1934  Room: [address] [place] [address]    Evaluating OT: Janusz Payne, OTR/L    AM-PAC Daily Activity Raw Score:   Recommended Adaptive Equipment: to be determined     Comments: Based on patient's functional performance as stated above and level of assistance needed prior to admission, this therapist believes that the patient would benefit from further skilled OT following hospital stay in an effort to increase safety, functional independence, and quality of life. Diagnosis: PNA  Pertinent Medical History: Acute MI, CAD, cardiogenic shock, complete heart block, dermatitis, hypercholesterolemia, HLD, HTN, hypothyroid, paroxysmal a-fib, PNA, shingles, thyroid disease, warfarin induced coagulopathy     Precautions:  Falls, contact and droplet isolation, , spinal neutrality (L1 compression fx mid , conservative tx), 2L o2     Home Living: Pt admitted from [place] [address]- Usually lives with sister and niece in a 2 story with 1st floor set up, with 2 step(s) to enter and 1 rail(s); bed/bath on main level  Bathroom setup: tub shower combo  Equipment owned: shower chair, rollator    Prior Level of Function: Assistance with ADLs and with IADLs; using ww for ambulation. Pain Level: Denies pain at rest  Cognition: A&O: to self, place, and month; not to year; Follows 1-2 step directions roughly 75% of the time. Mentation improved from prior treatment session. Memory: P   Sequencing: F   Problem solving: P+   Judgement/safety: P   RASS: +1  CAM-ICU: positive     Functional Assessment:   Initial Eval Status  Date: 3/27/19 Treatment Status  Date: 19 Short Term Goals  Treatment frequency: 1-3x/week on MICU; PRN on stepdown unit  -pt will. ..    Feeding Dependent  OGT Set up A  Currently NPO    improve self feeding task to independent when medically appropriate   Grooming Max A  To brush hair while seated EOB, pt dropped hair brush while attempting grooming task Min A  To brush hair while in supported sitting in bedside chair; To brush teeth and to use mouthwash. Pt required moderate cues to initiate task, and min cues for attention to task improve grooming/hygiene tasks to SBA while seated EOB    UB Dressing Mod A Min A   improve UB dressing to min A   LB Dressing Dependent Dependent   improve LB dressing to mod A with AE PRN   Bathing Max A Mod A   improve UB/LB bathing to min A   Toileting Dependent  Patient incontinent of bowel at initiation of session; rolling L & R for perineal hygiene and linen change Dependent   improve toileting task and all clothing mgmt to mod A   Bed Mobility  Supine to sit: Max A  Sit to supine: Max A x2 Supine to sit: Max A  Sit to supine: NT improve bed mobility to min A in prep for EOB ADL tasks   Functional Transfers Sit to stand: Max A x1  Stand to sit: Max A x1 Sit to stand: Max A x1  Stand to sit: Max A x1 improve all functional transfers to min A   Functional Mobility NT NT improve functional mobility to min A with AE PRN   Balance Sitting:     Static:  Max A to dependent    Dynamic: NT  Standing: Max/Dep Sitting:     Static:  Min A-Mod A    Dynamic: NT  Standing: Max/Dep demo SBA dynamic sitting balance EOB during ADL tasks   Endurance/Activity Tolerance fair Fair+ demo good- activity tolerance/endurance during 15-20 min ADL task    Visual/  Perceptual Glasses: no              Hand dominance: R    Vitals:  Blood Pressure at rest 96/67 Blood Pressure post session 135/67   Heart Rate at rest 96 bpm Heart Rate post session 116 bpm   SPO2 at rest 98% on 2 L SPO2 post session 95% on 2 L       Comments/Treatment Narrative: OK from RN to see patient. Upon arrival patient supine with HOB slightly elevated. Supine to sit. Sat EOB roughly 4 minutes- sitting balance ranged min A to mod A depending on cues, retropulsive; Sit to stand pivot to bedside chair for supported sitting.  Grooming completed as above. Patient properly positioned using pillows in chair to improve interaction with environment, overall functioning and decrease/prevent edema and contractures. After session, patient in position as stated above with all devices within reach, all lines and tubes intact, nursing in room. Patient mentation mildly improved from last session. Pt required cues and education as noted above for safe facilitation and completion of tasks. During functional activites and ADLs pt educated on proper hand placement, safety technique, sequencing, and energy conservation techniques. Therapist provided skilled monitoring of HR, O2 saturation, blood pressure and patient's response during treatment session. Prior to and at the end of session, environmental modifications/line management completed for patients safety and efficiency of treatment session. Patient has made good progress toward set goals, continue with POC.       45 min timed tx     Tx time in: 1335  Tx time out: [address] [facility] [facility], OTR/L  [id number]

## 2019-04-02 NOTE — PROGRESS NOTES
Date: 4/1/2019    Time: 10:42 PM    Patient Placed On BIPAP/CPAP/ Non-Invasive Ventilation? Yes    If no must comment. Facial area red/color change? No           If YES are Blister/Lesion present? No   If yes must notify nursing staff  BIPAP/CPAP skin barrier?   Yes    Skin barrier type:mepilex       Comments: PT PLACED ON BIPAP BECAUSE OF SHORTNESS OF BREATH, PT'S NOSE ASSESSED , PT HAS A SORE UNDER THE MEPILEX, [address]

## 2019-04-02 NOTE — PROGRESS NOTES
[address] [facility]  Department of Internal Medicine   Internal Medicine Residency   MICU Progress Note    Patient:  Pilo Harrison 80 y.o. female  MRN: [id number]     Date of Service: 4/2/2019    Allergy: Patient has no known allergies. Subjective     Patient's tachycardic irregular rhythm. Denies shortness of breath,  Endorses chest tightness. denies any pain anywhere else.  24h change: RRT was called yesterday due to episode of bradycardia. Patient stated she had chest pressure, and was diaphoretic. Stat EKG showed ST elevations in inferior leads and V4 through V6. Cardiology was spoken to along with family, patient's limited code status along with comorbidities does not make her a candidate for cath lab. Patient was therapeutic on Coumadin at the time. Cardiology has seen her today and will order echo, and optimize medical management.  Patient did not pass bedside swallow will repeat later  ROS: Denies Fever/chills/SOB/N/V/D/C/Dysuria/Blood in stool or urine  Objective     VS: /61   Pulse 85   Temp 98.1 °F (36.7 °C) (Core)   Resp 18   Ht 5' (1.524 m)   Wt 103 lb 9.9 oz (47 kg)   SpO2 99%   BMI 20.24 kg/m²           I & O - 24hr:     Intake/Output Summary (Last 24 hours) at 4/2/2019 0819  Last data filed at 4/2/2019 0542  Gross per 24 hour   Intake 1654 ml   Output 850 ml   Net 804 ml       Physical Exam:  · General Appearance: alert, appears stated age, cooperative and mild distress  · Neck: no adenopathy, no carotid bruit, no JVD, supple, symmetrical, trachea midline and thyroid not enlarged, symmetric, no tenderness/mass/nodules  · Lung: Equal air entry bilaterally, diminished breath sounds bilaterally, right lower lobe crackles.    · Heart: Irregularly irregular, tachycardic, normal S1-S2, no murmurs  · Abdomen: soft, non-tender; bowel sounds normal; no masses,  no organomegaly  · Extremities:  extremities normal, atraumatic, no cyanosis or edema  · Musculoskeletal: No joint swelling, no muscle tenderness. ROM normal in all joints of extremities.    · Neurologic: Mental status: Alert, oriented, thought content appropriate    Oxygen:     nasal canula at 0L/min/face mask    CPAP/BIPAP at 20/5 at night  ABG:     Lab Results   Component Value Date    PH 7.353 04/01/2019    PCO2 64.5 04/01/2019    PO2 144.2 04/01/2019    HCO3 35.1 04/01/2019    BE 7.9 04/01/2019    THB 10.2 04/01/2019    O2SAT 98.8 04/01/2019        Medications     Infusions: (Fluid, Sedation, Vasopressors)  IVF: n/a  Vasopressors: n/a  Sedation: n/a      Nutrition:   Dental soft      ATB:   Antibiotics  Days   cefepime dc   levaquin 5 more days (totally 14 days)         Skin issues: n/a  Patient currently has   Urinary cath  Isolation  Restraints  DVT prophylaxis/ GI prophylaxis,    PT/OT  SNF/NH placement  Palliative care consult   Labs     CBC with Differential:    Lab Results   Component Value Date    WBC 13.6 04/02/2019    RBC 3.00 04/02/2019    HGB 8.5 04/02/2019    HCT 28.3 04/02/2019     04/02/2019    MCV 94.3 04/02/2019    MCH 28.3 04/02/2019    MCHC 30.0 04/02/2019    RDW 17.2 04/02/2019    NRBC 0.9 03/27/2019    SEGSPCT 73 12/02/2011    BANDSPCT 27 09/20/2014    METASPCT 6 09/20/2014    LYMPHOPCT 4.3 04/02/2019    MONOPCT 1.7 04/02/2019    MYELOPCT 1.7 04/01/2019    BASOPCT 0.1 04/02/2019    MONOSABS 0.27 04/02/2019    LYMPHSABS 0.54 04/02/2019    EOSABS 0.00 04/02/2019    BASOSABS 0.00 04/02/2019     CMP:    Lab Results   Component Value Date     04/02/2019    K 4.3 04/02/2019    K 4.1 03/24/2019     04/02/2019    CO2 28 04/02/2019    BUN 40 04/02/2019    CREATININE 1.0 04/02/2019    GFRAA >60 04/02/2019    LABGLOM 53 04/02/2019    GLUCOSE 90 04/02/2019    GLUCOSE 95 03/09/2012    PROT 5.6 04/02/2019    LABALBU 2.9 04/02/2019    LABALBU 4.5 03/09/2012    CALCIUM 8.5 04/02/2019    BILITOT 0.4 04/02/2019    ALKPHOS 94 04/02/2019    AST 59 04/02/2019    ALT 72 04/02/2019     PT/INR:    Lab Results   Component Value Date    PROTIME 55.2 04/02/2019    INR 4.9 04/02/2019     Last 3 Troponin:    Lab Results   Component Value Date    TROPONINI 0.23 04/02/2019    TROPONINI 0.09 04/01/2019    TROPONINI 0.04 03/22/2019       Imaging Studies:  CXR: No evidence of airspace consolidation      Resident's Assessment and Plan   Principal Problem:    Acute on chronic respiratory failure with hypoxia and hypercapnia ([facility])  Active Problems:    Pulmonary hypertension (HCC)    Paroxysmal atrial fibrillation    Mixed hyperlipidemia    Pleural effusion    HTN (hypertension), benign    CAD (coronary artery disease), native coronary artery    Pneumonia    COPD (chronic obstructive pulmonary disease) (HCC)    Acquired hypothyroidism    Moderate protein-calorie malnutrition (HCC)  Resolved Problems:    COPD with acute exacerbation ([facility])    History of ST elevation myocardial infarction (STEMI)    CHB (complete heart block) (HCC)    PNA (pneumonia)    Respiratory failure ([facility] [address]    Cardiovascular  Inferior MI   -RRT for bradycardia, chest pressure, diaphoresis   -Stat EKG showed ST elevations in inferior leads, V4 through V6   -elevated trops 0.23   -Patient has limited code, family wishes no invasive measures, cardiology does not think that patient has candidate for heart Cath considering comorbidities   -Was given morphine, and started on high-dose aspirin   -Continue Lipitor, Lopressor   -Follow-up echo   -Cardiology following, states they will optimally medically managed patient's cardiac symptoms    A-fib/HLD   -Warfarin dosed by pharmacy   -Continue Lopressor, Lipitor       Pulmonary  Acute hypoxic, hypercapnic respiratory failure-resolving   -Likely secondary to COPD exacerbation from metapneumo virus   -On BiPAP at night   -Continue prednisone 40 daily for a total of 5 days, DuoNeb's as needed, budesonide, Perforomist   -DC cefepime, continue Levaquin for 5 more days   -Monitor respiratory needs      Hematology  Supratherapeutic INR   -INR 4.9 today   -Pharmacy dosing warfarin   -Home dose warfarin 4 mg a day   -Follow-up daily INR      Nephro  Hypernatremia-resolved   -monitor BMP    Gastrointestinal  GERD   -Continue Protonix      Endocrine  Hypothyroidism   -Continue Synthroid      DVT / GI prophylaxis: Warfarin and Protonix    Diet: dental soft    Disposition: ICU     Hospital course:   4/2/2019-f/u echo    Elisa Feldman MD, PGY-1    Attending physician: Dr. Hernandez Spar  Addendum ICU Attending Rafael Mcclellan was seen, examined and discussed with the multi-disciplinary ICU team during rounds. A addendum note may be separate to the residents note. If not then, I have personally seen and examined the patient and the key elements of the encounter were performed by me (> 85 % time). The medications & laboratory data was discussed and adjusted where necessary. The radiographic images were reviewed either as a group or with radiologist.  Any changes are document or changed if felt dis-concordant with the exam or history. The above findings were corroborated, plans confirmed and changes made if needed. Family was updated at the bedside as available. Key issues of the case were discussed among consultants. Critical Care time is documented if appropriate.       Wilfredo Silva DO, MPH  Professor of Medicine

## 2019-04-02 NOTE — PLAN OF CARE
Problem: Falls - Risk of:  Goal: Will remain free from falls  Description  Will remain free from falls  4/1/2019 2208 by Netta Montalvo RN  Outcome: Met This Shift  4/1/2019 1351 by Sherrie Vargas RN  Outcome: Met This Shift  Goal: Absence of physical injury  Description  Absence of physical injury  Outcome: Met This Shift     Problem: Risk for Impaired Skin Integrity  Goal: Tissue integrity - skin and mucous membranes  Description  Structural intactness and normal physiological function of skin and  mucous membranes. Outcome: Met This Shift     Problem: Aspiration:  Goal: Absence of aspiration  Description  Absence of aspiration  Outcome: Met This Shift     Problem: Bowel Function - Altered:  Goal: Bowel elimination is within specified parameters  Description  Bowel elimination is within specified parameters  Outcome: Met This Shift   Plan of care discussed with patient / family.

## 2019-04-02 NOTE — FLOWSHEET NOTE
Dr. Zohaib Muller called to bedside. Pt noted to be bradycardic with ST elevation on bedside cardiac monitor. RT called to place pt back on Bipap d/t labored respirations. EKG obtained per order. Morphine given per order. See MAMI Keyesstew Nam called to update and she wished to get cardiology on board to evaluate. Stat consult placed to Dr. Jono Callahan. Dr. Zohaib Muller spoke with him and patient to be medically managed.

## 2019-04-02 NOTE — PROGRESS NOTES
SPEECH/LANGUAGE PATHOLOGY  BEDSIDE SWALLOWING EVALUATION    PATIENT NAME:  Ivanna Chavez      :  1934      TODAY'S DATE:  2019    SUMMARY OF EVALUATION     DYSPHAGIA DIAGNOSIS:  Marked pharyngeal dysphagia      DIET RECOMMENDATIONS: NPO (nothing by mouth including oral meds)       FEEDING RECOMMENDATIONS:     Assistance level: n/a      Compensatory strategies recommended:n/a    THERAPY RECOMMENDATIONS:      Dysphagia therapy is recommended 3-5 times per week with emphasis on the following, To improve tongue base retraction and To improve laryngeal elevation and closure     No further recommendations at this time                  PROCEDURE     Consistencies Administered During the Evaluation   Liquids: thin liquid   Solids:  pureed foods      Method of Intake:   straw and spoon  Fed by clinician      Position:   Upright seated in the bed                  RESULTS     Oral Stage:    Functional      The oral stage of swallowing was within functional limits      Comments:      Pharyngeal Stage:  Impaired    Wet respirations were noted after presentation of thin liquid and pudding consistency liquid, Multiple swallows were noted after presentation of pudding consistency liquid and Delayed initiation of the pharyngeal swallow noted                  Prognosis for improvements is good  This plan will be re-evaluated and revised in 1 week  if warranted. Patient stated goals: agreed with above  Treatment goals discussed with [x]  patient/  []  family. The [x]  patient/ []  family understand the diagnosis, prognosis and plan of care. [x]The admitting diagnosis and active problem list, as listed below have been reviewed prior to initiation of this evaluation.      ADMITTING DIAGNOSIS: PNA (pneumonia) [J18.9]  PNA (pneumonia) [J18.9]  Respiratory failure ([address] [J96.90]  Respiratory failure ([address] [J96.90]     ACTIVE PROBLEM LIST:   Patient Active Problem List   Diagnosis    Pulmonary hypertension ([address]    Paroxysmal atrial fibrillation    Mixed hyperlipidemia    Pleural effusion    HTN (hypertension), benign    CAD in native artery    Pneumonia due to organism    Acute on chronic respiratory failure with hypoxia and hypercapnia (HCC)    COPD (chronic obstructive pulmonary disease) (HCC)    Acquired hypothyroidism    Moderate protein-calorie malnutrition (HCC)    Acute electrocardiogram changes    Chest pain due to myocardial ischemia    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

## 2019-04-02 NOTE — PROGRESS NOTES
ID Progress Note                [address] [facility], [address] [facility]            Phone [phone]     Fax [phone]      Chief complaint    Fever, weakness, shortness of breath  Off BIPAP  Failed bedside nursing eval for swallow test      Subjective:  Awake. Still coughing  confused, Afebrile. Objective:    Vitals:    04/02/19 1000   BP: 108/64   Pulse: 87   Resp: (!) 31   Temp:    SpO2: 98%     GENERAL:  The patient is awake, agitated,BIPAP  HEENT:  Atraumatic, normocephalic. PERRLA. EOMI. RESPIRATORY:  Air entry bilaterally equal.  No wheezes or crackles. CARDIOVASCULAR:  S1 and S2 normal.  Tachycardiac. ABDOMEN:  Soft. Nontender, nondistended. Bowel sounds present. EXTREMITIES:  No pedal edema. NEUROLOGIC:  Grossly intact. LINES:  She has right IJ in place in the peripheral line. Palomo  catheter in place. Labs:  Recent Labs     03/31/19  0700 04/01/19  0500 04/02/19  0440   WBC 9.3 12.0* 13.6*   RBC 3.30* 3.18* 3.00*   HGB 9.5* 9.0* 8.5*   HCT 30.3* 29.6* 28.3*   MCV 91.8 93.1 94.3   MCH 28.8 28.3 28.3   MCHC 31.4* 30.4* 30.0*   RDW 17.4* 17.1* 17.2*    366 316   MPV 9.6 9.7 9.5     CMP:    Lab Results   Component Value Date     04/02/2019    K 4.3 04/02/2019    K 4.1 03/24/2019     04/02/2019    CO2 28 04/02/2019    BUN 40 04/02/2019    CREATININE 1.0 04/02/2019    GFRAA >60 04/02/2019    LABGLOM 53 04/02/2019    GLUCOSE 90 04/02/2019    GLUCOSE 95 03/09/2012    PROT 5.6 04/02/2019    LABALBU 2.9 04/02/2019    LABALBU 4.5 03/09/2012    CALCIUM 8.5 04/02/2019    BILITOT 0.4 04/02/2019    ALKPHOS 94 04/02/2019    AST 59 04/02/2019    ALT 72 04/02/2019          Microbiology :  resp cx- pseudomonas    ASSESSMENT AND PLAN:  1. Nosocomial pneumonia, pseudomonas pneumonia   so far no MRSA isolated from the lungs. 2.  Metapneumovirus viral infection. 3.  History of COPD/past history of smoking.   Recent right-sided pleural  effusion transudative, unknown etiology. 4.  Coronary artery disease status post stent/AFib/supratherapeutic INR  that is normalized now.   5.  History of subdural hematoma.     PLAN:    levaquin for 4 more days to complete 14 day course        Electronically signed by Alysa Garcia MD on 4/2/2019 at 10:46 AM

## 2019-04-02 NOTE — PROGRESS NOTES
Pharmacy Consultation Note  (Warfarin Dosing and Monitoring)    Initial consult date: 3/24  Consulting physician: Ra Childress    Allergies:  Patient has no known allergies. 80 y.o. female    Ht Readings from Last 1 Encounters:   03/23/19 5' (1.524 m)     Wt Readings from Last 1 Encounters:   04/02/19 103 lb 9.9 oz (47 kg)         Warfarin Indication Target   INR Range Home Dose  (if applicable) Diet/Feeding Tube   (Enteral feeds, nutritional drinks and increased Vitamin K in diet can decrease INR)   atrial fibrillation   2-3 4 mg daily per nursing home records-warfarin resumed around 3/18. Held since Dec 2018 d/t ICH s/p fall Soft dental diet ordered 3/30           Vitamin K or Blood product  Administration Date                    TSH:    Lab Results   Component Value Date    TSH 4.620 06/15/2018        Hepatic Function Panel:                            Lab Results   Component Value Date    ALKPHOS 94 04/02/2019    ALT 72 04/02/2019    AST 59 04/02/2019    PROT 5.6 04/02/2019    BILITOT 0.4 04/02/2019    BILIDIR <0.2 09/19/2014    IBILI 0.5 09/19/2014    LABALBU 2.9 04/02/2019    LABALBU 4.5 03/09/2012       Date Warfarin Dose INR Heparin or LMWH HBG/HCT PLT Comment   3/22 Held 3.6 --- 12.1/40.8 231    3/23 Held 3.4 --- 9.8/33.3 184    3/24 Held --- --- 9.2/30.2 182    3/25 2.5 mg 2.4 --- 9.8/32.3 222    3/26 2.5 mg  1.8 --- 8.7/28.4 156    3/27 2.5 mg 2.0 --- 9/29 228    3/28 2.5 mg 2.5 --- 8.4/27 237    3/29 Held 2.7 --- 9.1/29.3 295    3/30  2.5 mg  2.5 ---  8.9/28.6  314    3/31 Not given on time 2 --- 9.5/30.3  338    4/1 3 mg @ 0204  2.5 mg 2.5 --- 9/29.6 366    4/2 HOLD 4.9 --- 8.5/28.3 316      Assessment:  · 80year old female who presented with fever/SOB from facility and is admitted for respiratory failure/pneumonia secondary to human metapneumovirus.    · Clinical pharmacist consulted to dose warfarin: indication: afib, home dose: warfarin 4 mg daily per nursing home records -warfarin resumed around 3/18 as outpatient, had been Held since Dec 2018 d/t ICH s/p fall  · INR today= 4.9; goal INR 2-3  · Now on prednisone 40 mg daily (previously on methylprednisolone 40 mg q12h)  · Patient started levofloxacin 750 mg q48h on 4/1/19    Plan:  · HOLD warfarin today  · Daily PT/INR until the INR is stable within the therapeutic range  · Pharmacist will follow and monitor/adjust dosing as necessary    Thank you for this consult.     Lissy Rojas, PharmD 4/2/2019 6:39 AM

## 2019-04-03 PROBLEM — E43 SEVERE PROTEIN-CALORIE MALNUTRITION (HCC): Status: ACTIVE | Noted: 2019-01-01

## 2019-04-03 NOTE — PROGRESS NOTES
[facility] [address] [facility]  [address] [facility], [address]  [facility], [address] [facility]      Pulmonary Progress Note      SUBJECTIVE:  On NC, stable with respiratory function with similar cough and not much sputum production, may have used BIPAP at night    OBJECTIVE    Medications    Continuous Infusions:      Scheduled Meds:   levofloxacin  750 mg Intravenous Q48H    nystatin   Topical BID    aspirin  81 mg Oral Daily    docusate sodium  100 mg Oral Daily    predniSONE  40 mg Oral Lunch    heparin flush  3 mL Intravenous 2 times per day    warfarin (COUMADIN) daily dosing (placeholder)   Other RX Placeholder    ipratropium-albuterol  3 mL Inhalation Q4H WA    atorvastatin  10 mg Oral Daily    metoprolol tartrate  25 mg Oral BID    levothyroxine  75 mcg Oral Daily    budesonide  1 mg Nebulization BID    formoterol  20 mcg Nebulization Q12H       PRN Meds:sodium chloride flush, heparin flush, acetaminophen    Physical    VITALS:  /63   Pulse 92   Temp 97 °F (36.1 °C) (Temporal)   Resp 20   Ht 5' (1.524 m)   Wt 92 lb 1.6 oz (41.8 kg)   SpO2 100%   BMI 17.99 kg/m²     24HR INTAKE/OUTPUT:      Intake/Output Summary (Last 24 hours) at 4/3/2019 1517  Last data filed at 4/3/2019 1400  Gross per 24 hour   Intake 608 ml   Output --   Net 608 ml       24HR PULSE OXIMETRY RANGE:    SpO2  Av.7 %  Min: 90 %  Max: 100 %    General appearance: alert, appears stated age and cooperative  Lungs: rhonchi bilaterally  Heart: regular rate and rhythm, S1, S2 normal, no murmur, click, rub or gallop  Abdomen: soft, non-tender; bowel sounds normal; no masses,  no organomegaly  Extremities: extremities normal, atraumatic, no cyanosis or edema  Neurologic: Mental status: awake, looks at me with conversation but minimal interaction, not looking in distress    Data    CBC:   Recent Labs     19  0500 19  0440 19  0540   WBC 12.0* 13.6* 15.3*   HGB 9.0* 8.5* patient in consultation. Please contact us with any questions. Office [phone] or after hours through [employer], [phone].

## 2019-04-03 NOTE — PROGRESS NOTES
[address] [facility]  Department of Internal Medicine   Internal Medicine Residency   MICU Progress Note    Patient:  Leora Davis 80 y.o. female  MRN: [id number]     Date of Service: 4/3/2019    Allergy: Patient has no known allergies. Subjective     Patient's tachycardic irregular rhythm. Denies shortness of breath, chest pain today. 24h change: Patient has mild elevation in white count from yesterday. However no fevers or increased productive sputum. INR is 5.4 today, FOBT negative. Video swallow ordered for today. ROS: Denies Fever/chills/SOB/N/V/D/C/Dysuria/Blood in stool or urine  Objective     VS: BP (!) 138/122   Pulse 121   Temp 98 °F (36.7 °C) (Axillary)   Resp 28   Ht 5' (1.524 m)   Wt 92 lb 1.6 oz (41.8 kg)   SpO2 100%   BMI 17.99 kg/m²           I & O - 24hr:     Intake/Output Summary (Last 24 hours) at 4/3/2019 1232  Last data filed at 4/3/2019 0600  Gross per 24 hour   Intake 281 ml   Output 155 ml   Net 126 ml       Physical Exam:  · General Appearance: alert, appears stated age, cooperative and mild distress  · Neck: no adenopathy, no carotid bruit, no JVD, supple, symmetrical, trachea midline and thyroid not enlarged, symmetric, no tenderness/mass/nodules  · Lung: Diminished breath sounds bilaterally, bilateral rhonchi at bases equal air entry bilaterally   · Heart: Irregularly irregular, tachycardic, normal S1-S2, no murmurs  · Abdomen: soft, non-tender; bowel sounds normal; no masses,  no organomegaly  · Extremities:  extremities normal, atraumatic, no cyanosis or edema  · Musculoskeletal: No joint swelling, no muscle tenderness. ROM normal in all joints of extremities.    · Neurologic: Mental status: Alert, oriented, thought content appropriate    Oxygen:     nasal canula at 0L/min/face mask    CPAP/BIPAP at 20/5 at night  ABG:     Lab Results   Component Value Date    PH 7.353 04/01/2019    PCO2 64.5 04/01/2019    PO2 144.2 04/01/2019    HCO3 35.1 04/01/2019    BE 7.9 04/01/2019    THB 10.2 04/01/2019    O2SAT 98.8 04/01/2019        Medications     Infusions: (Fluid, Sedation, Vasopressors)  IVF: n/a  Vasopressors: n/a  Sedation: n/a      Nutrition:   Dental soft      ATB:   Antibiotics  Days   cefepime dc   levaquin 4 more days (totally 14 days)         Skin issues: n/a  Patient currently has   Urinary cath  Isolation  Restraints  DVT prophylaxis/ GI prophylaxis,    PT/OT  SNF/NH placement  Palliative care consult   Labs     CBC with Differential:    Lab Results   Component Value Date    WBC 15.3 04/03/2019    RBC 3.58 04/03/2019    HGB 10.1 04/03/2019    HCT 33.8 04/03/2019     04/03/2019    MCV 94.4 04/03/2019    MCH 28.2 04/03/2019    MCHC 29.9 04/03/2019    RDW 17.6 04/03/2019    NRBC 0.9 03/27/2019    SEGSPCT 73 12/02/2011    BANDSPCT 27 09/20/2014    METASPCT 6 09/20/2014    LYMPHOPCT 2.6 04/03/2019    MONOPCT 3.5 04/03/2019    MYELOPCT 1.7 04/01/2019    BASOPCT 0.2 04/03/2019    MONOSABS 0.61 04/03/2019    LYMPHSABS 0.46 04/03/2019    EOSABS 0.00 04/03/2019    BASOSABS 0.00 04/03/2019     CMP:    Lab Results   Component Value Date     04/03/2019    K 4.3 04/03/2019    K 4.1 03/24/2019     04/03/2019    CO2 28 04/03/2019    BUN 43 04/03/2019    CREATININE 0.9 04/03/2019    GFRAA >60 04/03/2019    LABGLOM 60 04/03/2019    GLUCOSE 73 04/03/2019    GLUCOSE 95 03/09/2012    PROT 6.2 04/03/2019    LABALBU 3.4 04/03/2019    LABALBU 4.5 03/09/2012    CALCIUM 8.8 04/03/2019    BILITOT 0.4 04/03/2019    ALKPHOS 96 04/03/2019    AST 39 04/03/2019    ALT 58 04/03/2019     PT/INR:    Lab Results   Component Value Date    PROTIME 60.9 04/03/2019    INR 5.4 04/03/2019     Last 3 Troponin:    Lab Results   Component Value Date    TROPONINI 0.23 04/02/2019    TROPONINI 0.09 04/01/2019    TROPONINI 0.04 03/22/2019       Imaging Studies:  CXR: No evidence of airspace consolidation      Resident's Assessment and Plan   Principal Problem:    Acute on chronic respiratory failure with hypoxia and hypercapnia (HCC)  Active Problems:    Pulmonary hypertension (HCC)    Paroxysmal atrial fibrillation    Mixed hyperlipidemia    Pleural effusion    HTN (hypertension), benign    CAD in native artery    Pneumonia due to organism    COPD (chronic obstructive pulmonary disease) (HCC)    Acquired hypothyroidism    Moderate protein-calorie malnutrition (HCC)    Acute electrocardiogram changes    Chest pain due to myocardial ischemia    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    Anticoagulated on Coumadin    ST elevation myocardial infarction (STEMI) ([address]  Resolved Problems:    COPD with acute exacerbation (HCC)    History of ST elevation myocardial infarction (STEMI)    CHB (complete heart block) (HCC)    PNA (pneumonia)    Respiratory failure ([address]    Cardiovascular  Inferior MI   -RRT for bradycardia, chest pressure, diaphoresis   -Stat EKG showed ST elevations in inferior leads, V4 through V6   -elevated trops 0.23   -Patient has limited code, family wishes no invasive measures, cardiology does not think that patient has candidate for heart Cath considering comorbidities   -Was given morphine, and started on high-dose aspirin   -Continue Lipitor, Lopressor   -Follow-up echo   -Cardiology following, states they will optimally medically managed patient's cardiac symptoms    A-fib/HLD   -Warfarin dosed by pharmacy   -Continue Lopressor, Lipitor   -Received 3 doses of digoxin       Pulmonary  Acute hypoxic, hypercapnic respiratory failure-resolving   -Likely secondary to COPD exacerbation from metapneumo virus   -On BiPAP at night   -Continue prednisone 40 daily for a total of 5 days, DuoNeb's as needed, budesonide, Perforomist   -DC cefepime, continue Levaquin for 4 more days   -Monitor respiratory needs      Hematology  Supratherapeutic INR   -INR 5.4 today   -Pharmacy dosing warfarin   -Home dose warfarin 4 mg a day   -Follow-up daily INR, no sources of bleeding, hemoglobin stable      Nephro  Hypernatremia-resolved   -monitor BMP    Gastrointestinal  GERD   -Continue Protonix      Endocrine  Hypothyroidism   -Continue Synthroid      DVT / GI prophylaxis: Warfarin and Protonix    Diet: dental soft    Disposition: ICU     Hospital course:   4/3/2019-f/u echo. Video swallow  to be ordered today. Patient receiving very little nutrition. Consider corepak for feedings, keeping in mind patient's limited code status. Kalli Hernandez MD, PGY-1    Attending physician: Dr. Austin Eduardo  Addendum ICU Attending Mary Carmen Dawn was seen, examined and discussed with the multi-disciplinary ICU team during rounds. A addendum note may be separate to the residents note. If not then, I have personally seen and examined the patient and the key elements of the encounter were performed by me (> 85 % time). The medications & laboratory data was discussed and adjusted where necessary. The radiographic images were reviewed either as a group or with radiologist.  Any changes are document or changed if felt dis-concordant with the exam or history. The above findings were corroborated, plans confirmed and changes made if needed. Family was updated at the bedside as available. Key issues of the case were discussed among consultants. Critical Care time is documented if appropriate.       Félix Jalloh DO, MPH  Professor of Medicine

## 2019-04-03 NOTE — PROGRESS NOTES
records -warfarin resumed around 3/18 as outpatient, had been Held since Dec 2018 d/t ICH s/p fall  · INR today= 5.4; goal INR 2-3  · Now on prednisone 40 mg daily (previously on methylprednisolone 40 mg q12h)  · Patient started levofloxacin 750 mg q48h on 4/1/19    Plan:  · HOLD warfarin today  · Daily PT/INR until the INR is stable within the therapeutic range  · Pharmacist will follow and monitor/adjust dosing as necessary    Thank you for this consult.     Ector Hayden, PharmD 4/3/2019 8:29 AM

## 2019-04-03 NOTE — PROGRESS NOTES
ID Progress Note                [address] [facility] [address], [place], [address]            Phone [phone]     Fax [phone]      Chief complaint    Fever, weakness, shortness of breath  Off BIPAP  Failed bedside nursing eval for swallow test      Subjective:  Awake. Still coughing  confused, Afebrile. Objective:    Vitals:    04/03/19 1430   BP: 133/63   Pulse: 92   Resp: 20   Temp: 97 °F (36.1 °C)   SpO2: 100%     GENERAL:  The patient is awake, agitated,BIPAP  HEENT:  Atraumatic, normocephalic. PERRLA. EOMI. RESPIRATORY:  Air entry bilaterally equal.  No wheezes or crackles. CARDIOVASCULAR:  S1 and S2 normal.  Tachycardiac. ABDOMEN:  Soft. Nontender, nondistended. Bowel sounds present. EXTREMITIES:  No pedal edema. NEUROLOGIC:  Grossly intact. LINES:  She has right IJ in place in the peripheral line. Palomo  catheter in place. Labs:  Recent Labs     04/01/19  0500 04/02/19  0440 04/03/19  0540   WBC 12.0* 13.6* 15.3*   RBC 3.18* 3.00* 3.58   HGB 9.0* 8.5* 10.1*   HCT 29.6* 28.3* 33.8*   MCV 93.1 94.3 94.4   MCH 28.3 28.3 28.2   MCHC 30.4* 30.0* 29.9*   RDW 17.1* 17.2* 17.6*    316 403   MPV 9.7 9.5 9.9     CMP:    Lab Results   Component Value Date     04/03/2019    K 4.3 04/03/2019    K 4.1 03/24/2019     04/03/2019    CO2 28 04/03/2019    BUN 43 04/03/2019    CREATININE 0.9 04/03/2019    GFRAA >60 04/03/2019    LABGLOM 60 04/03/2019    GLUCOSE 73 04/03/2019    GLUCOSE 95 03/09/2012    PROT 6.2 04/03/2019    LABALBU 3.4 04/03/2019    LABALBU 4.5 03/09/2012    CALCIUM 8.8 04/03/2019    BILITOT 0.4 04/03/2019    ALKPHOS 96 04/03/2019    AST 39 04/03/2019    ALT 58 04/03/2019          Microbiology :  resp cx- pseudomonas    ASSESSMENT AND PLAN:  1. Nosocomial pneumonia, pseudomonas pneumonia   so far no MRSA isolated from the lungs. 2.  Metapneumovirus viral infection. 3.  History of COPD/past history of smoking.   Recent right-sided pleural  effusion transudative, unknown etiology. 4.  Coronary artery disease status post stent/AFib/supratherapeutic INR  that is normalized now.   5.  History of subdural hematoma.     PLAN:    levaquin for 3 more days to complete 14 day course  Steroids taper as per pulm        Electronically signed by Jean Hill MD on 4/3/2019 at 5:13 PM

## 2019-04-03 NOTE — PROGRESS NOTES
Left detailed message with Dr Madison Warren regarding need for video swallow per the recd of the speech therapist.  Patient and family have also discussed that they would want a peg tube if the need arises. Dr Madison Warren made of aware of this also. Awaiting orders for this.

## 2019-04-03 NOTE — CARE COORDINATION
4/3  Transition of care notes  Remains in icu  Bedside swallow yesterday  recom npo  Will need video swallow to be done  Cardiology following  In isolation  On 2l nc  Per nursing  Alert but confused  No family present  Cont iv levaquin  Awaiting icu team for plan of care  Should transfer out of icu  Plan is for [facility] when stable  M [address] Wayne RN Case Manager

## 2019-04-03 NOTE — PROGRESS NOTES
Physical Therapy    Patient on PT caseload for treatment. Patient unavailable for treatment at time of attempt. Transport personnel present to transfer patient to step down unit. Will check back as able. Thank you.      Boris Dorantes, PT, DPT  [id number]

## 2019-04-03 NOTE — PROGRESS NOTES
>60 04/03/2019    LABGLOM 60 04/03/2019    GLUCOSE 73 04/03/2019    GLUCOSE 95 03/09/2012     Magnesium:    Lab Results   Component Value Date    MG 2.2 04/03/2019     Phosphorus:    Lab Results   Component Value Date    PHOS 2.2 04/03/2019     PT/INR:    Lab Results   Component Value Date    PROTIME 60.9 04/03/2019    INR 5.4 04/03/2019     PTT:    Lab Results   Component Value Date    APTT 45.9 09/19/2014   [APTT}  ABG:    Lab Results   Component Value Date    PH 7.353 04/01/2019    PCO2 64.5 04/01/2019    PO2 144.2 04/01/2019    HCO3 35.1 04/01/2019    BE 7.9 04/01/2019    O2SAT 98.8 04/01/2019        Assessment:    Patient Active Problem List   Diagnosis    Pulmonary hypertension ([facility]    Paroxysmal atrial fibrillation    Mixed hyperlipidemia    Pleural effusion    HTN (hypertension), benign    CAD in native artery    Pneumonia due to organism    Acute on chronic respiratory failure with hypoxia and hypercapnia (HCC)    COPD (chronic obstructive pulmonary disease) ([facility]    Acquired hypothyroidism    Moderate protein-calorie malnutrition (HCC)    Acute electrocardiogram changes    Chest pain due to myocardial ischemia    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 Rate controlled. INR supratherapeutic. Cardiology consulted. Continue aggressive lipid therapy  Stable. Blood pressure ok, continue current medications  Continue medical management of ASCAD. Possible acute bacterial pneumonia on top of metapneumo virus infection. ID on board. On cefepime  Secondary to COPD exacerbation, pneumonia and human metapneumovirus infection. Extubated. Speech pathology for swallow evaluation. Continue aggressive pulmonary hygiene. Continue breathing treatments. Continue synthroid. Pt/Ot evaluations for discharge planning. .    Maryellen Nearing    10:13 AM  4/3/2019

## 2019-04-03 NOTE — PROGRESS NOTES
Received and order for bedside swallow evaluation. This was completed yesterday and NPO was recommended.    If re-evaluation is needed, a Video Swallow Study is recommended and requires a physician order

## 2019-04-03 NOTE — PROGRESS NOTES
Date: 4/3/2019    Time: 1:17 AM    Patient Placed On BIPAP/CPAP/ Non-Invasive Ventilation? Yes    If no must comment. Facial area red/color change? No           If YES are Blister/Lesion present? Yes   If yes must notify nursing staff  BIPAP/CPAP skin barrier?   Yes    Skin barrier type:mepilex       Comments rn :notified on prior shift of sore on pts nose        Rayne Vega

## 2019-04-03 NOTE — PLAN OF CARE
Problem: Falls - Risk of:  Goal: Will remain free from falls  Description  Will remain free from falls  4/2/2019 2314 by Eda Cardenas RN  Outcome: Met This Shift     Problem: Falls - Risk of:  Goal: Absence of physical injury  Description  Absence of physical injury  4/2/2019 2314 by Eda Cardenas RN  Outcome: Met This Shift     Problem: Risk for Impaired Skin Integrity  Goal: Tissue integrity - skin and mucous membranes  Description  Structural intactness and normal physiological function of skin and  mucous membranes.   4/2/2019 2314 by Eda Cardenas RN  Outcome: Met This Shift     Problem: Anxiety/Stress:  Goal: Level of anxiety will decrease  Description  Level of anxiety will decrease  4/2/2019 2314 by Eda Cardenas RN  Outcome: Met This Shift     Problem: Gas Exchange - Impaired:  Goal: Levels of oxygenation will improve  Description  Levels of oxygenation will improve  4/2/2019 2314 by Eda Cardenas RN  Outcome: Met This Shift     Problem: Skin Integrity - Impaired:  Goal: Will show no infection signs and symptoms  Description  Will show no infection signs and symptoms  4/2/2019 2314 by Eda Cardenas RN  Outcome: Met This Shift     Problem: Skin Integrity - Impaired:  Goal: Absence of new skin breakdown  Description  Absence of new skin breakdown  4/2/2019 2314 by Eda Cardenas RN  Outcome: Met This Shift

## 2019-04-03 NOTE — PROGRESS NOTES
Reason for follow up:  ACS, AF, influenza    Subjective:  No CP. SOB improving. No new complaints. Objective:    No distress. No events overnight. Scheduled Meds:   nystatin   Topical BID    aspirin  81 mg Oral Daily    docusate sodium  100 mg Oral Daily    levofloxacin  750 mg Oral Every Other Day    predniSONE  40 mg Oral Lunch    heparin flush  3 mL Intravenous 2 times per day    warfarin (COUMADIN) daily dosing (placeholder)   Other RX Placeholder    ipratropium-albuterol  3 mL Inhalation Q4H WA    atorvastatin  10 mg Oral Daily    metoprolol tartrate  25 mg Oral BID    levothyroxine  75 mcg Oral Daily    budesonide  1 mg Nebulization BID    formoterol  20 mcg Nebulization Q12H         Intake/Output Summary (Last 24 hours) at 4/3/2019 0852  Last data filed at 4/3/2019 0600  Gross per 24 hour   Intake 281 ml   Output 395 ml   Net -114 ml       Patient Vitals for the past 96 hrs (Last 3 readings):   Weight   04/03/19 0600 92 lb 1.6 oz (41.8 kg)   04/02/19 0542 103 lb 9.9 oz (47 kg)   04/01/19 0458 100 lb 5 oz (45.5 kg)          PE:   /63   Pulse 99   Temp 98.1 °F (36.7 °C) (Oral)   Resp 25   Ht 5' (1.524 m)   Wt 92 lb 1.6 oz (41.8 kg)   SpO2 100%   BMI 17.99 kg/m²   CONST: In general, this is a well developed, elderly female who appears of stated age. Awake, alert, cooperative, no apparent distress. Throat: Oral mucosa pink and moist.  HEENT: Head- normocephalic, atraumatic; Eyes:conjunctivae pink, no noted xanthomas. Neck: no stridor, no noted enlargement of the thyroid,symmetric, no tenderness, no jugular venous distention. No carotid bruit noted. CHEST: Symmetrical and non-tender to palpation. No noted accessory muscle use or intercostal retractions. LUNGS: coarse breathe sounds. diminished AE b/l; few creps; diffuse ronchi.    CARDIOVASCULAR:   Heart Inspection shows no noted pulsations  Heart Palpation: no heaves or thrills, PMI in 5th ISC near left midclavicular line   Heart Ausculation -Normal S1 and S2, IRRR, no murmur, s3, s4 or rub noted. PV: no extremity edema. No varicosities. Pedal pulses palpable, no clubbing or cyanosis   ABDOMEN: Soft, non-tender to light palpation. Bowel sounds present. No palpable masses no hepatosplenomegaly or splenomegaly; no abdominal bruit / pulsation  MS: Good muscle strength and tone. Not atrophy or abnormal movements. : deferred. SKIN: Warm and dry no statis dermatitis or ulcers. NEURO / PSYCH: Oriented to person, place and time. Speech clear and appropriate. Follows all commands. Pleasant affect. Monitor: AF, rates       Lab Review       Recent Labs     04/01/19  0500 04/02/19  0440 04/03/19  0540   WBC 12.0* 13.6* 15.3*   HGB 9.0* 8.5* 10.1*   HCT 29.6* 28.3* 33.8*    316 403       Recent Labs     04/01/19  0500 04/01/19 2036 04/02/19  0440 04/03/19  0540    144 144 147*   K 3.9 4.9 4.3 4.3    100 105 104   CO2 28 31* 28 28   PHOS 2.3*  --  2.5 2.2*   BUN 48* 43* 40* 43*   CREATININE 0.9 1.0 1.0 0.9       Recent Labs     04/03/19  0540   AST 39*   ALT 58*   ALKPHOS 96           Last 3 Troponin:    Lab Results   Component Value Date    TROPONINI 0.23 04/02/2019    TROPONINI 0.09 04/01/2019    TROPONINI 0.04 03/22/2019         Recent Labs     04/01/19  0500 04/02/19  0440 04/03/19  0540   INR 2.5 4.9 5.4*        Assessment:  1. Presented with hypoxic respiratory failure in setting of + meta-pneumovirus. Improved. Presently on high flow NC. Respiratory status improving. No CHF on exam.   2. CP with CHRISTA on EKG, 4/1/19: no aggressive measures as per family. Will therefore pursue medical therapy. ASA, BB. Probably stent thrombosis of the prior RCA stent. Presently symptom free. Enzyme pattern not consistent with large MI.   3. CAD s/p PCI to RCA 2004. On BB and lipitor as OP. 4. PAF: on coumadin. Was SR on presentation. AF on last EKG. INR high.    5. HTN: controlled

## 2019-04-03 NOTE — PROGRESS NOTES
Nutrition Assessment    Type and Reason for Visit: Reassess    Nutrition Summary: Pt status declining now w/ severe malnutrition s/p extubation w/ NPO status d/t noted swallowing issues. Will monitor nutrition progression/ provide recs as needed. Nutrition Recommendations: Continue NPO. MBS will need ordered for appropriate diet per SLP vs EN support? Malnutrition Assessment:  · Malnutrition Status: Meets the criteria for severe malnutrition  · Context: Chronic illness  · Findings of the 6 clinical characteristics of malnutrition (Minimum of 2 out of 6 clinical characteristics is required to make the diagnosis of moderate or severe Protein Calorie Malnutrition based on AND/ASPEN Guidelines):  1. Energy Intake-Less than or equal to 50% of estimated energy requirement, Greater than or equal to 7 days(however PTA average <75% po intake x 3 mon )    2. Weight Loss-Unable to assess(d/t fluctuations )   3. Fat Loss-Moderate subcutaneous fat loss, Orbital  4. Muscle Loss-Severe muscle mass loss, Temples (temporalis muscle), Clavicles (pectoralis and deltoids)  5. Fluid Accumulation-No significant fluid accumulation  6.  Strength-Not measured    Nutrition Risk Level: Moderate    Nutrient Needs:  · Estimated Daily Total Kcal: (MSJ REE)  · Estimated Daily Protein (g): 60-70(1.3-1.5 g/kg )  · Estimated Daily Total Fluid (ml/day): (1 ml/kcal )    Nutrition Diagnosis:   · Problem:  Moderate malnutrition, In context of chronic illness  · Etiology: related to Catabolic illness     Signs and symptoms:  as evidenced by Diet history of poor intake, Moderate loss of subcutaneous fat, Severe muscle loss    Objective Information:  · Nutrition-Focused Physical Findings: pt alert, confused, s/p extubation, weakness, +I/O's, trace edema, boggy B/L heels, dysphagia, active BS      · Wound Type: None     · Current Nutrition Therapies:  · Oral Diet Orders: NPO     · Anthropometric Measures:  · Ht: 5' (152.4 cm)   · Current Body Wt: 92 lb (41.7 kg)(4/3 actual )  · Admission Body Wt: 100 lb (45.4 kg)(3/23 first measured)  · Usual Body Wt: (EMR actuals wts 89-94lb 1 year back )  · % Weight Change:noted wt fluctuations since admit & EMR hx PTA. unable to assess true changes   · Ideal Body Wt: 100 lb (45.4 kg), % Ideal Body 92%  · BMI Classification: BMI <18.5 Underweight    Nutrition Interventions:   Continued Inpatient Monitoring, Education not appropriate at this time, Coordination of Care(Pt status & NPO status d/w RN)    Nutrition Evaluation:   · Evaluation: Goals set   · Goals: Nutrition Progression     · Monitoring: Nutrition Progression, Weight, Mental Status/Confusion, Monitor Bowel Function, Pertinent Labs, Skin Integrity, I&O      Electronically signed by Dayna Fry RD, LD on 4/3/19 at 3:05 PM    Contact Number: Ext [phone]

## 2019-04-04 NOTE — PROGRESS NOTES
SPEECH/LANGUAGE PATHOLOGY  VIDEOFLUOROSCOPIC STUDY OF SWALLOWING (MBS)      PATIENT NAME:  Duc Tidwell      :  1934      TODAY'S DATE:  2019    SUMMARY OF EVALUATION     DYSPHAGIA DIAGNOSIS:  Marked pharyngeal dysphagia      DIET RECOMMENDATIONS: NPO (nothing by mouth including oral meds)       FEEDING RECOMMENDATIONS:     Assistance level:  Not applicable      Compensatory strategies recommended: Not applicable    THERAPY RECOMMENDATIONS:      Dysphagia therapy is recommended 3-5 times per week with emphasis on the following, To improve tongue base retraction and To improve laryngeal elevation and closure                  PROCEDURE     Consistencies Administered During the Evaluation   Liquids: honey thick liquid   Solids:  pureed foods      Method of Intake:   spoon  Fed by clinician      Position:   Upright seated  and Lateral    Current Respiratory Status   nasal canula                RESULTS     ORAL STAGE       The oral stage of swallowing was within functional limits        PHARYNGEAL STAGE           ONSET TIME     Onset time of the pharyngeal swallow was adequate       PHARYNGEAL RESIDUALS          Vallecula/Pharyngeal Wall           Reduced tongue base retraction resulting in residuals in vallecula and/or posterior pharyngeal wall for honey consistency liquid and pudding consistency liquid which partially cleared  with cued double swallow      Pyriform Sinuses      Residuals in the pyriform sinuses were noted due to pharyngeal weakness for honey consistency liquid and pudding consistency liquid which partially cleared  with cued double swallow    LARYNGEAL PENETRATION     Laryngeal penetration occurred in the absence of aspiration DURING the swallow for honey consistency liquid and pudding consistency liquid due to  delayed laryngeal closurewhich remained in the laryngeal vestibule.  . Laryngeal penetration was mild and occurred consistently an absent cough/throat clear was noted                 ASPIRATION    Aspiration was not present during this evaluation         COMPENSATORY STRATEGIES       Compensatory strategies that were not beneficial included Throat clear      STRUCTURAL/FUNCTIONAL ANOMALIES       No structural/functional anomalies were noted      CERVICAL ESOPHAGEAL STAGE :        The cervical esophagus appeared adequate                              Prognosis for improvements is good,   This plan will be re-evaluated and revised in 1 week  if warranted. ,   Patient stated goals: Agreed with above,   Treatment goals discussed with Patient   The Patient understand the diagnosis, prognosis and plan of care. [x]The admitting diagnosis and active problem list, as listed below have been reviewed prior to initiation of this evaluation.      ADMITTING DIAGNOSIS: PNA (pneumonia) [J18.9]  PNA (pneumonia) [J18.9]  Respiratory failure ([address] [J96.90]  Respiratory failure ([address] [J96.90]     ACTIVE PROBLEM LIST:   Patient Active Problem List   Diagnosis    Pulmonary hypertension ([address]    Paroxysmal atrial fibrillation    Mixed hyperlipidemia    Pleural effusion    HTN (hypertension), benign    CAD in native artery    Pneumonia due to organism    Acute on chronic respiratory failure with hypoxia and hypercapnia (HCC)    COPD (chronic obstructive pulmonary disease) ([address]    Acquired hypothyroidism    Severe protein-calorie malnutrition ([address]    ST elevation myocardial infarction (STEMI) ([address]

## 2019-04-04 NOTE — PROGRESS NOTES
[facility]  [address] [facility], [address], [address] [facility]      Pulmonary Progress Note      SUBJECTIVE:  On NC, stable with respiratory function with similar cough and not much sputum production, flutter device doesn't appear to have been used, no incentive spirometer, not sure if BIPAP used last night, will need PEG as failed swallowing study    OBJECTIVE    Medications    Continuous Infusions:      Scheduled Meds:   nystatin   Topical BID    aspirin  81 mg Oral Daily    docusate sodium  100 mg Oral Daily    predniSONE  40 mg Oral Lunch    heparin flush  3 mL Intravenous 2 times per day    warfarin (COUMADIN) daily dosing (placeholder)   Other RX Placeholder    ipratropium-albuterol  3 mL Inhalation Q4H WA    atorvastatin  10 mg Oral Daily    metoprolol tartrate  25 mg Oral BID    levothyroxine  75 mcg Oral Daily    budesonide  1 mg Nebulization BID    formoterol  20 mcg Nebulization Q12H       PRN Meds:sodium chloride flush, acetaminophen    Physical    VITALS:  /63   Pulse 81   Temp 97.5 °F (36.4 °C) (Temporal)   Resp 18   Ht 5' (1.524 m)   Wt 88 lb 12.8 oz (40.3 kg)   SpO2 99%   BMI 17.34 kg/m²     24HR INTAKE/OUTPUT:      Intake/Output Summary (Last 24 hours) at 2019 1627  Last data filed at 2019 0531  Gross per 24 hour   Intake 0 ml   Output 0 ml   Net 0 ml       24HR PULSE OXIMETRY RANGE:    SpO2  Av.7 %  Min: 97 %  Max: 100 %    General appearance: alert, appears stated age and cooperative  Lungs: rhonchi bilaterally  Heart: regular rate and rhythm, S1, S2 normal, no murmur, click, rub or gallop  Abdomen: soft, non-tender; bowel sounds normal; no masses,  no organomegaly  Extremities: extremities normal, atraumatic, no cyanosis or edema  Neurologic: Mental status: awake, looks at me with conversation but minimal interaction, not looking in distress    Data    CBC:   Recent Labs     19  0440 04/03/19  0540   WBC 13.6* 15.3*   HGB 8.5* 10.1*   HCT 28.3* 33.8*   MCV 94.3 94.4    403       BMP:  Recent Labs     04/01/19 2036 04/02/19 0440 04/03/19  0540    144 147*   K 4.9 4.3 4.3    105 104   CO2 31* 28 28   PHOS  --  2.5 2.2*   BUN 43* 40* 43*   CREATININE 1.0 1.0 0.9    ALB:3,BILIDIR:3,BILITOT:3,ALKPHOS:3)@    PT/INR:   Recent Labs     04/02/19 0440 04/03/19  0540 04/04/19  0713   PROTIME 55.2* 60.9* 64.7*   INR 4.9 5.4* 5.8*       ABG:   No results for input(s): PH, PO2, PCO2, HCO3, BE, O2SAT, METHB, O2HB, COHB, O2CON, HHB, THB in the last 72 hours. FiO2 : 40 %  I:E Ratio: 1:2.40    Radiology/Other tests reviewed: CXR 4/1 reviewed with slightly increased haziness right lower lung fields    Assessment:     Principal Problem:    Acute on chronic respiratory failure with hypoxia and hypercapnia (HCC)  Active Problems:    Pulmonary hypertension (HCC)    Paroxysmal atrial fibrillation    Mixed hyperlipidemia    Pleural effusion    HTN (hypertension), benign    CAD in native artery    Pneumonia due to organism    COPD (chronic obstructive pulmonary disease) (HCC)    Acquired hypothyroidism    Severe protein-calorie malnutrition (HCC)    ST elevation myocardial infarction (STEMI) ([facility]  Resolved Problems:    COPD with acute exacerbation (HCC)    History of ST elevation myocardial infarction (STEMI)    CHB (complete heart block) (HCC)    PNA (pneumonia)    Respiratory failure (HCC)    Acute electrocardiogram changes    Chest pain due to myocardial ischemia    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    Anticoagulated on Coumadin      Plan:       1. Cont with BIPAP qhs and prn daytime, NC rest of the time, observe respiratory function  2. Cont with nebs, observe sputum production  3. For PEG as per surgery  4. Flutter and incentive spirometer, encourage use  5. Steroid taper as tolerated        Thanks for letting us see this patient in consultation. Please contact us with any questions.  Office [phone] or after hours through [place], x [phone].

## 2019-04-04 NOTE — PROGRESS NOTES
Physical Therapy    Will hold on rx today due to inr of 5.8 .    Pt may need a reeval.    Roldan Baxter, [address]

## 2019-04-04 NOTE — PROGRESS NOTES
Pharmacy Consultation Note  (Warfarin Dosing and Monitoring)    Initial consult date: 3/24  Consulting physician: Hira Cole    Allergies:  Patient has no known allergies. 80 y.o. female    Ht Readings from Last 1 Encounters:   03/23/19 5' (1.524 m)     Wt Readings from Last 1 Encounters:   04/04/19 88 lb 12.8 oz (40.3 kg)         Warfarin Indication Target   INR Range Home Dose  (if applicable) Diet/Feeding Tube   (Enteral feeds, nutritional drinks and increased Vitamin K in diet can decrease INR)   atrial fibrillation   2-3 4 mg daily per nursing home records-warfarin resumed around 3/18. Held since Dec 2018 d/t ICH s/p fall Soft dental diet ordered 3/30           Vitamin K or Blood product  Administration Date                    TSH:    Lab Results   Component Value Date    TSH 4.620 06/15/2018        Hepatic Function Panel:                            Lab Results   Component Value Date    ALKPHOS 96 04/03/2019    ALT 58 04/03/2019    AST 39 04/03/2019    PROT 6.2 04/03/2019    BILITOT 0.4 04/03/2019    BILIDIR <0.2 09/19/2014    IBILI 0.5 09/19/2014    LABALBU 3.4 04/03/2019    LABALBU 4.5 03/09/2012       Date Warfarin Dose INR Heparin or LMWH HBG/HCT PLT Comment   3/22 Held 3.6 --- 12.1/40.8 231    3/23 Held 3.4 --- 9.8/33.3 184    3/24 Held --- --- 9.2/30.2 182    3/25 2.5 mg 2.4 --- 9.8/32.3 222    3/26 2.5 mg  1.8 --- 8.7/28.4 156    3/27 2.5 mg 2.0 --- 9/29 228    3/28 2.5 mg 2.5 --- 8.4/27 237    3/29 Held 2.7 --- 9.1/29.3 295    3/30  2.5 mg  2.5 ---  8.9/28.6  314    3/31 Not given on time 2 --- 9.5/30.3  338    4/1 3 mg @ 0204  2.5 mg 2.5 --- 9/29.6 366    4/2 HOLD 4.9 --- 8.5/28.3 316    4/3 HOLD 5.4 --- 10.1/33.8 403    4/4 HOLD 5.8 --- X X      Assessment:  · 80year old female who presented with fever/SOB from facility and is admitted for respiratory failure/pneumonia secondary to human metapneumovirus.    · Clinical pharmacist consulted to dose warfarin: indication: afib, home dose: warfarin 4 mg

## 2019-04-04 NOTE — PROGRESS NOTES
Patient currently Afib RVR on the monitor with rate in the 110s-120s. Dr. Wendy Acosta notified of heart rate, rhythm, and NPO status. New orders received.

## 2019-04-04 NOTE — CONSULTS
GENERAL SURGERY  CONSULT NOTE  4/4/2019    Physician Consulted: Dr. Messi Mann  Reason for Consult: Dysphagia and need for PEG  Referring Physician: Dr. Ana Hook    HPI  [place] is a 80 y.o. female who presents for evaluation of dysphagia and need for PEG. Patient was admitted to the hospital on 2/24 for respiratory failure found to have PNA and human metapneumo virus. She was extubated 3/30 and attempted a video swallow today and failed. A PEG was recommended. She has history of afib and is on coumadin, last two doses held. INR this am was 5.8. She denies an previous abdominal surguries and history of ascites. Past Medical History:   Diagnosis Date    Acute MI, inferior wall ([address] 1/2004    CAD (coronary artery disease)     Cardiogenic shock (HCC)     Subtotal subacute thrombosis RCA.  CHB (complete heart block) ([address] 01/2004    Transient CHB    Dermatitis     Hypercholesterolemia     Hyperlipidemia     Hypertension     Hypothyroidism     Paroxysmal atrial fibrillation ([address]     Pneumonia 9/20/2014    Shingles     Thyroid disease     Warfarin-induced coagulopathy ([address] 9/20/2014       Past Surgical History:   Procedure Laterality Date    ANGIOPLASTY  2004    APPENDECTOMY      CARDIAC PACEMAKER PLACEMENT Left 01/23/2004    [facility] 1298 Programmed VVIR. Failed attempt to insert atrial leads. Adequate thresholds could not be obtained trying two different electrodes. Therefore, the atrial lead was removed and the pulse generator atrial port was cancelled with an indiffferent short electrode block.  COLONOSCOPY  04/06/2011    CORONARY ANGIOPLASTY WITH STENT PLACEMENT  1/2009    Dilatation stent of 1/2009 - addition stents x 3.    DIAGNOSTIC CARDIAC CATH LAB PROCEDURE  1/9/2004    [facility]:  Subtotal RCA, partially successful PTCA - stent RCA.  DIAGNOSTIC CARDIAC CATH LAB PROCEDURE  1/12/2--4    [facility]: Acute total occlusion RCA stent.     ECHO COMPL W DOP COLOR FLOW mg by mouth daily. Yes Historical Provider, MD   calcium-vitamin D (OSCAL-500) 500-200 MG-UNIT per tablet Take 1 tablet by mouth daily. Yes Historical Provider, MD   multivitamin [facility]) per tablet Take 1 tablet by mouth daily. Yes Historical Provider, MD       No Known Allergies    Family History   Problem Relation Age of Onset    High Blood Pressure Mother     Stroke Mother        Social History     Tobacco Use    Smoking status: Former Smoker     Packs/day: 1.00     Years: 40.00     Pack years: 40.00     Types: Cigarettes     Start date: 1951     Last attempt to quit: 2004     Years since quittin.5    Smokeless tobacco: Never Used   Substance Use Topics    Alcohol use: No     Comment: drinks 2 cups of coffee daily    Drug use: No         Review of Systems   General ROS: negative for - chills, fatigue or fever  Hematological and Lymphatic ROS: negative  Respiratory ROS: positive for - cough and shortness of breath  Cardiovascular ROS: no chest pain or dyspnea on exertion  Gastrointestinal ROS: no abdominal pain, change in bowel habits, or black or bloody stools  Genito-Urinary ROS: no dysuria, trouble voiding, or hematuria  Musculoskeletal ROS: negative      PHYSICAL EXAM:    Vitals:    19 1600   BP: 134/63   Pulse: 81   Resp: 18   Temp: 97.5 °F (36.4 °C)   SpO2: 99%       General Appearance:  awake, alert, oriented, in no acute distress  Skin:  Skin color, texture, turgor normal. No rashes or lesions. Head/face:  NCAT  Eyes:  No gross abnormalities. Lungs:  No increased work of breathing 4L  Heart:  RR  Abdomen:  Soft, non-tender, non-distended   Extremities: Extremities warm to touch, pink, with no edema.     LABS:  CBC  Recent Labs     19  0540   WBC 15.3*   HGB 10.1*   HCT 33.8*        BMP  Recent Labs     19  0540   *   K 4.3      CO2 28   BUN 43*   CREATININE 0.9   CALCIUM 8.8     Liver Function  Recent Labs     19  0540   BILITOT 0.4

## 2019-04-04 NOTE — PROGRESS NOTES
Subjective:    Awake alert. Extubated (3/30/2019). Follows commands. Breathing better. Using NIPPV. Issues with swallowing. Offers no complaints. Objective:    BP (!) 134/90   Pulse 86   Temp 98.1 °F (36.7 °C) (Temporal)   Resp 18   Ht 5' (1.524 m)   Wt 88 lb 12.8 oz (40.3 kg)   SpO2 100%   BMI 17.34 kg/m²     Current medications that patient is taking have been reviewed. Heart:  RRR, no murmurs, gallops, or rubs.   Lungs:  Decreased breath sounds bilaterally  Abd: bowel sounds present, soft, nontender, nondistended, no masses  Extrem:  No cyanosis or edema    CBC with Differential:    Lab Results   Component Value Date    WBC 15.3 04/03/2019    RBC 3.58 04/03/2019    HGB 10.1 04/03/2019    HCT 33.8 04/03/2019     04/03/2019    MCV 94.4 04/03/2019    MCH 28.2 04/03/2019    MCHC 29.9 04/03/2019    RDW 17.6 04/03/2019    NRBC 0.9 03/27/2019    SEGSPCT 73 12/02/2011    BANDSPCT 27 09/20/2014    METASPCT 6 09/20/2014    LYMPHOPCT 2.6 04/03/2019    MONOPCT 3.5 04/03/2019    MYELOPCT 1.7 04/01/2019    BASOPCT 0.2 04/03/2019    MONOSABS 0.61 04/03/2019    LYMPHSABS 0.46 04/03/2019    EOSABS 0.00 04/03/2019    BASOSABS 0.00 04/03/2019     CMP:    Lab Results   Component Value Date     04/03/2019    K 4.3 04/03/2019    K 4.1 03/24/2019     04/03/2019    CO2 28 04/03/2019    BUN 43 04/03/2019    CREATININE 0.9 04/03/2019    GFRAA >60 04/03/2019    LABGLOM 60 04/03/2019    GLUCOSE 73 04/03/2019    GLUCOSE 95 03/09/2012    PROT 6.2 04/03/2019    LABALBU 3.4 04/03/2019    LABALBU 4.5 03/09/2012    CALCIUM 8.8 04/03/2019    BILITOT 0.4 04/03/2019    ALKPHOS 96 04/03/2019    AST 39 04/03/2019    ALT 58 04/03/2019     BMP:    Lab Results   Component Value Date     04/03/2019    K 4.3 04/03/2019    K 4.1 03/24/2019     04/03/2019    CO2 28 04/03/2019    BUN 43 04/03/2019    LABALBU 3.4 04/03/2019    LABALBU 4.5 03/09/2012    CREATININE 0.9 04/03/2019    CALCIUM 8.8 04/03/2019 GFRAA >60 04/03/2019    LABGLOM 60 04/03/2019    GLUCOSE 73 04/03/2019    GLUCOSE 95 03/09/2012     Magnesium:    Lab Results   Component Value Date    MG 2.2 04/03/2019     Phosphorus:    Lab Results   Component Value Date    PHOS 2.2 04/03/2019     PT/INR:    Lab Results   Component Value Date    PROTIME 64.7 04/04/2019    INR 5.8 04/04/2019     PTT:    Lab Results   Component Value Date    APTT 45.9 09/19/2014   [APTT}  ABG:    Lab Results   Component Value Date    PH 7.353 04/01/2019    PCO2 64.5 04/01/2019    PO2 144.2 04/01/2019    HCO3 35.1 04/01/2019    BE 7.9 04/01/2019    O2SAT 98.8 04/01/2019        Assessment:    Patient Active Problem List   Diagnosis    Pulmonary hypertension ([facility]    Paroxysmal atrial fibrillation    Mixed hyperlipidemia    Pleural effusion    HTN (hypertension), benign    CAD in native artery    Pneumonia due to organism    Acute on chronic respiratory failure with hypoxia and hypercapnia (HCC)    COPD (chronic obstructive pulmonary disease) ([facility]    Acquired hypothyroidism    Severe protein-calorie malnutrition (HCC)    ST elevation myocardial infarction (STEMI) ([facility]       Plan:  Stable. Rate controlled. INR supratherapeutic. Cardiology consulted. Continue aggressive lipid therapy  Stable. Blood pressure ok, continue current medications  Continue medical management of ASCAD. Possible acute bacterial pneumonia on top of metapneumo virus infection. ID on board. On cefepime  Secondary to COPD exacerbation, pneumonia and human metapneumovirus infection. Extubated. Speech pathology for swallow evaluation. Continue aggressive pulmonary hygiene. Continue breathing treatments. Continue synthroid. Supplement diet. Cardiology on board. Medical management. Pt/Ot evaluations for discharge planning. .    Viraj Palmer    11:10 AM  4/4/2019

## 2019-04-04 NOTE — CARE COORDINATION
Per chart review patient is currently hospitalized. Post acute care coordinator signing off.  CISCO Escobar RN  Care Transition Coordinator  [phone]

## 2019-04-04 NOTE — PLAN OF CARE
Problem: Falls - Risk of:  Goal: Will remain free from falls  Description  Will remain free from falls  Outcome: Met This Shift  Goal: Absence of physical injury  Description  Absence of physical injury  Outcome: Met This Shift     Problem: Gas Exchange - Impaired:  Goal: Levels of oxygenation will improve  Description  Levels of oxygenation will improve  Outcome: Met This Shift     Problem: Skin Integrity - Impaired:  Goal: Absence of new skin breakdown  Description  Absence of new skin breakdown  Outcome: Met This Shift

## 2019-04-05 NOTE — PROGRESS NOTES
Reason for follow up:  ACS, AF, influenza    Subjective:  Feels about the same. No CP. SOB gradually improving. Objective:    No distress. No events overnight. Scheduled Meds:   metoprolol  2.5 mg Intravenous Q6H    nystatin   Topical BID    aspirin  81 mg Oral Daily    docusate sodium  100 mg Oral Daily    predniSONE  40 mg Oral Lunch    heparin flush  3 mL Intravenous 2 times per day    warfarin (COUMADIN) daily dosing (placeholder)   Other RX Placeholder    ipratropium-albuterol  3 mL Inhalation Q4H WA    atorvastatin  10 mg Oral Daily    levothyroxine  75 mcg Oral Daily    budesonide  1 mg Nebulization BID    formoterol  20 mcg Nebulization Q12H       Continuous Infusions:   sodium chloride             Intake/Output Summary (Last 24 hours) at 4/5/2019 0948  Last data filed at 4/5/2019 0543  Gross per 24 hour   Intake 100 ml   Output 0 ml   Net 100 ml       Patient Vitals for the past 96 hrs (Last 3 readings):   Weight   04/04/19 0531 88 lb 12.8 oz (40.3 kg)   04/03/19 0600 92 lb 1.6 oz (41.8 kg)   04/02/19 0542 103 lb 9.9 oz (47 kg)          PE:   BP (!) 147/74   Pulse 87   Temp 98.7 °F (37.1 °C) (Temporal)   Resp 18   Ht 5' (1.524 m)   Wt 88 lb 12.8 oz (40.3 kg)   SpO2 100%   BMI 17.34 kg/m²   CONST: In general, this is a well developed, edlerly female who appears of stated age. Awake, alert, cooperative, no apparent distress. Throat: Oral mucosa pink and moist.  HEENT: Head- normocephalic, atraumatic; Eyes:conjunctivae pink, no noted xanthomas. Neck: no stridor, no noted enlargement of the thyroid,symmetric, no tenderness, no jugular venous distention. No carotid bruit noted. CHEST: Symmetrical and non-tender to palpation. No noted accessory muscle use or intercostal retractions. LUNGS: diminished AE b/l; few creps; no ronchi.    CARDIOVASCULAR:   Heart Inspection shows no noted pulsations  Heart Palpation: no heaves or thrills, PMI in 5th ISC

## 2019-04-05 NOTE — PROGRESS NOTES
Subjective:    Awake alert. Extubated (3/30/2019). Follows commands. Issues with atrial fibrillation last night. Breathing better. Using NIPPV. failed swallowing study. Offers no complaints. Objective:    BP (!) 147/74   Pulse 87   Temp 98.7 °F (37.1 °C) (Temporal)   Resp 18   Ht 5' (1.524 m)   Wt 88 lb 12.8 oz (40.3 kg)   SpO2 100%   BMI 17.34 kg/m²     Current medications that patient is taking have been reviewed. Heart:  RRR, no murmurs, gallops, or rubs.   Lungs:  Decreased breath sounds bilaterally  Abd: bowel sounds present, soft, nontender, nondistended, no masses  Extrem:  No cyanosis or edema    CBC with Differential:    Lab Results   Component Value Date    WBC 16.5 04/05/2019    RBC 3.24 04/05/2019    HGB 9.2 04/05/2019    HCT 31.0 04/05/2019     04/05/2019    MCV 95.7 04/05/2019    MCH 28.4 04/05/2019    MCHC 29.7 04/05/2019    RDW 17.6 04/05/2019    NRBC 0.9 03/27/2019    SEGSPCT 73 12/02/2011    BANDSPCT 27 09/20/2014    METASPCT 6 09/20/2014    LYMPHOPCT 3.1 04/05/2019    MONOPCT 3.9 04/05/2019    MYELOPCT 1.7 04/01/2019    BASOPCT 0.1 04/05/2019    MONOSABS 0.65 04/05/2019    LYMPHSABS 0.52 04/05/2019    EOSABS 0.01 04/05/2019    BASOSABS 0.01 04/05/2019     CMP:    Lab Results   Component Value Date     04/05/2019    K 4.0 04/05/2019    K 4.1 03/24/2019     04/05/2019    CO2 30 04/05/2019    BUN 51 04/05/2019    CREATININE 0.8 04/05/2019    GFRAA >60 04/05/2019    LABGLOM >60 04/05/2019    GLUCOSE 109 04/05/2019    GLUCOSE 95 03/09/2012    PROT 6.0 04/05/2019    LABALBU 3.4 04/05/2019    LABALBU 4.5 03/09/2012    CALCIUM 9.1 04/05/2019    BILITOT 0.6 04/05/2019    ALKPHOS 77 04/05/2019    AST 21 04/05/2019    ALT 34 04/05/2019     BMP:    Lab Results   Component Value Date     04/05/2019    K 4.0 04/05/2019    K 4.1 03/24/2019     04/05/2019    CO2 30 04/05/2019    BUN 51 04/05/2019    LABALBU 3.4 04/05/2019    LABALBU 4.5 03/09/2012 CREATININE 0.8 04/05/2019    CALCIUM 9.1 04/05/2019    GFRAA >60 04/05/2019    LABGLOM >60 04/05/2019    GLUCOSE 109 04/05/2019    GLUCOSE 95 03/09/2012     Magnesium:    Lab Results   Component Value Date    MG 1.8 04/05/2019     Phosphorus:    Lab Results   Component Value Date    PHOS 2.2 04/03/2019     PT/INR:    Lab Results   Component Value Date    PROTIME 16.4 04/05/2019    INR 1.4 04/05/2019     PTT:    Lab Results   Component Value Date    APTT 45.9 09/19/2014   [APTT}  ABG:    Lab Results   Component Value Date    PH 7.353 04/01/2019    PCO2 64.5 04/01/2019    PO2 144.2 04/01/2019    HCO3 35.1 04/01/2019    BE 7.9 04/01/2019    O2SAT 98.8 04/01/2019        Assessment:    Patient Active Problem List   Diagnosis    Pulmonary hypertension ([facility]    Paroxysmal atrial fibrillation    Mixed hyperlipidemia    Pleural effusion    HTN (hypertension), benign    CAD in native artery    Pneumonia due to organism    Acute on chronic respiratory failure with hypoxia and hypercapnia (HCC)    COPD (chronic obstructive pulmonary disease) ([facility]    Acquired hypothyroidism    Severe protein-calorie malnutrition (HCC)    ST elevation myocardial infarction (STEMI) ([facility]       Plan:  Stable. Rate controlled. INR supratherapeutic. Cardiology consulted. Continue aggressive lipid therapy  Stable. Blood pressure ok, continue current medications  Continue medical management of ASCAD. Possible acute bacterial pneumonia on top of metapneumo virus infection. ID on board. On cefepime  Secondary to COPD exacerbation, pneumonia and human metapneumovirus infection. Extubated. Speech pathology for swallow evaluation. Continue aggressive pulmonary hygiene. Continue breathing treatments. Continue synthroid. Supplement diet. Cardiology on board. Medical management. General surgery consulted for PEG. Pt/Ot evaluations for discharge planning. Dayanna Newness Discharge once PEG in place and tolerating tube feeds.   Arcelia Bowens

## 2019-04-05 NOTE — OP NOTE
Op Note    Leora Davis  YOB: 1934  [id number]    DATE OF PROCEDURE: 4/5/2019    SURGEON: JAYLENE Aguayo Pat: None    PREOPERATIVE DIAGNOSIS: Dysphagia   . POSTOPERATIVE DIAGNOSIS: Same, duodenal ulcers    OPERATION: EGD with PEG placement. ANESTHESIA: LMAC. ESTIMATED BLOOD LOSS: None    COMPLICATIONS: None. SPECIMENS: None. DESCRIPTION OF PROCEDURE: The patient was taken to the endoscopy suite and  placed on the endoscopy table in a supine position. LMAC anesthesia was  administered. A bite block was inserted. A lubricated gastroscope was inserted through the oropharynx and advanced  under direct vision to the esophagus and then the stomach. The pylorus was intubated. There were duodenal ulcers. The stomach was  insufflated. The anterior abdominal wall was transilluminated. This area  was marked, prepped, and draped. Local anesthetic was injected. An 11 blade  was used to make a transverse incision. A snare forceps was inserted through the  gastroscope and deployed into the gastric lumen. An angiocatheter was  inserted through the incision and inserted into the gastric lumen under  direct vision. A wire was inserted through the angiocatheter and grasped with  a snare. The gastroscope, snare, and wire were then retrieved. The wire was connected to the gastrostomy tube and then pulled through the esophagus, stomach, and out through the anterior abdominal wall. The gastroscope was reintroduced into the stomach. The PEG tube was seen in good position without evidence of bleeding. The gastroscope was removed. The PEG tube was fit with a bumper and feeding port and connected to gravity. A binder was applied. The patient tolerated the procedure well.     Payal Nj MD  4/5/2019  2:59 PM

## 2019-04-05 NOTE — PROGRESS NOTES
ID Progress Note                [address] [facility] [address], [place], [address]            Phone [phone]     Fax [phone]      Chief complaint   supratherapeutic INR   Fever, weakness, shortness of breath  Off BIPAP  Failed bedside nursing eval for swallow test      Subjective:  Awake. Still coughing  confused, Afebrile. Objective:    Vitals:    04/04/19 1945   BP: (!) 111/52   Pulse: 122   Resp: 18   Temp: 96.9 °F (36.1 °C)   SpO2: 100%     GENERAL:  The patient is awake, agitated,BIPAP  HEENT:  Atraumatic, normocephalic. PERRLA. EOMI. RESPIRATORY:  Air entry bilaterally equal.  No wheezes or crackles. CARDIOVASCULAR:  S1 and S2 normal.  Tachycardiac. ABDOMEN:  Soft. Nontender, nondistended. Bowel sounds present. EXTREMITIES:  No pedal edema. NEUROLOGIC:  Grossly intact. LINES:  She has right IJ in place in the peripheral line. Palomo  catheter in place. Labs:  Recent Labs     04/02/19  0440 04/03/19  0540   WBC 13.6* 15.3*   RBC 3.00* 3.58   HGB 8.5* 10.1*   HCT 28.3* 33.8*   MCV 94.3 94.4   MCH 28.3 28.2   MCHC 30.0* 29.9*   RDW 17.2* 17.6*    403   MPV 9.5 9.9     CMP:    Lab Results   Component Value Date     04/03/2019    K 4.3 04/03/2019    K 4.1 03/24/2019     04/03/2019    CO2 28 04/03/2019    BUN 43 04/03/2019    CREATININE 0.9 04/03/2019    GFRAA >60 04/03/2019    LABGLOM 60 04/03/2019    GLUCOSE 73 04/03/2019    GLUCOSE 95 03/09/2012    PROT 6.2 04/03/2019    LABALBU 3.4 04/03/2019    LABALBU 4.5 03/09/2012    CALCIUM 8.8 04/03/2019    BILITOT 0.4 04/03/2019    ALKPHOS 96 04/03/2019    AST 39 04/03/2019    ALT 58 04/03/2019          Microbiology :  resp cx- pseudomonas    ASSESSMENT AND PLAN:  1. Nosocomial pneumonia, pseudomonas pneumonia   so far no MRSA isolated from the lungs. 2.  Metapneumovirus viral infection. 3.  History of COPD/past history of smoking. Recent right-sided pleural  effusion transudative, unknown etiology.   4.

## 2019-04-05 NOTE — ANESTHESIA PRE PROCEDURE
Department of Anesthesiology  Preprocedure Note       Name:  Fabrizio Le   Age:  80 y.o.  :  1934                                          MRN:  [id number]         Date:  2019      Surgeon: Satish Pruitt):  Juan Appiah MD    Procedure: EGD ESOPHAGOGASTRODUODENOSCOPY PEG TUBE INSERTION (N/A )    Medications prior to admission:   Prior to Admission medications    Medication Sig Start Date End Date Taking? Authorizing Provider   docusate sodium (COLACE) 100 MG capsule Take 100 mg by mouth daily   Yes Historical Provider, MD   famotidine (PEPCID) 20 MG tablet Take 20 mg by mouth daily   Yes Historical Provider, MD   ammonium lactate (LAC-HYDRIN) 12 % lotion Apply topically as needed for Dry Skin (apply to both feet every night until healed) Apply topically as needed. Yes Historical Provider, MD   polyethylene glycol (GLYCOLAX) packet Take 17 g by mouth daily   Yes Historical Provider, MD   Cholecalciferol (VITAMIN D3) 5000 units TABS Take 1 tablet by mouth daily   Yes Historical Provider, MD   warfarin (COUMADIN) 4 MG tablet Take 4 mg by mouth daily 3/20/19  Yes Historical Provider, MD   dronabinol (MARINOL) 2.5 MG capsule Take 2.5 mg by mouth 2 times daily (before meals).    Yes Historical Provider, MD   Nutritional Supplements (ENSURE PLUS) LIQD Take 237 mLs by mouth 3 times daily   Yes Historical Provider, MD   ipratropium-albuterol (DUONEB) 0.5-2.5 (3) MG/3ML SOLN nebulizer solution Inhale 3 mLs into the lungs every 4 hours (while awake) 1/15/19  Yes Saida Cee MD   Fluticasone-Umeclidin-Vilant (TRELEGY ELLIPTA) 092-90.8-39 MCG/INH AEPB Inhale 1 actuation into the lungs daily    Yes Historical Provider, MD   acetaminophen (TYLENOL) 500 MG tablet Take 500 mg by mouth every 6 hours as needed for Pain   Yes Historical Provider, MD   atorvastatin (LIPITOR) 10 MG tablet TAKE 1 TABLET BY MOUTH EVERY DAY 18  Yes Lulu Calero MD   metoprolol tartrate (LOPRESSOR) 50 MG tablet Take 1 tablet by mouth 2 times daily 9/4/18  Yes Halima Singh MD   ascorbic acid (VITAMIN C) 500 MG tablet Take 500 mg by mouth daily. Yes Historical Provider, MD   calcium-vitamin D (OSCAL-500) 500-200 MG-UNIT per tablet Take 1 tablet by mouth daily. Yes Historical Provider, MD   multivitamin [facility]) per tablet Take 1 tablet by mouth daily.    Yes Historical Provider, MD       Current medications:    Current Facility-Administered Medications   Medication Dose Route Frequency Provider Last Rate Last Dose    0.45 % sodium chloride infusion   Intravenous Continuous Kendall Guerrero  mL/hr at 04/05/19 0955      metoprolol (LOPRESSOR) injection 2.5 mg  2.5 mg Intravenous Q6H Cannon Babinski, MD   2.5 mg at 04/05/19 5833    nystatin (MYCOSTATIN) ointment   Topical BID Gattis Dakins, MD        aspirin EC tablet 81 mg  81 mg Oral Daily Chase Fuentes MD   81 mg at 04/02/19 1141    docusate sodium (COLACE) capsule 100 mg  100 mg Oral Daily Chase Fuentes MD        sodium chloride flush 0.9 % injection 10 mL  10 mL Intravenous PRN Gattis Dakins, MD   10 mL at 04/04/19 2006    heparin flush 100 UNIT/ML injection 300 Units  3 mL Intravenous 2 times per day Gattis Dakins, MD   Stopped at 03/31/19 2243    warfarin (COUMADIN) daily dosing (placeholder)   Other RX Placeholder Chase Fuentes MD        acetaminophen (TYLENOL) tablet 500 mg  500 mg Oral Q6H PRN Gattis Dakins, MD   500 mg at 03/28/19 0731    ipratropium-albuterol (DUONEB) nebulizer solution 3 mL  3 mL Inhalation Q4H WA Chase Fuentes MD   3 mL at 04/05/19 0903    atorvastatin (LIPITOR) tablet 10 mg  10 mg Oral Daily Gattis Dakins, MD   10 mg at 04/02/19 1272    levothyroxine (SYNTHROID) tablet 75 mcg  75 mcg Oral Daily Chase Fuentes MD   Stopped at 04/05/19 0700    budesonide (PULMICORT) nebulizer suspension 1,000 mcg  1 mg Nebulization CATH LAB PROCEDURE  2004    [facility]:  Subtotal RCA, partially successful PTCA - stent RCA.  DIAGNOSTIC CARDIAC CATH LAB PROCEDURE  --4    [facility]: Acute total occlusion RCA stent.  ECHO COMPL W DOP COLOR FLOW  2013         HEMORRHOID SURGERY      THROMBECTOMY      [employer].  TOTAL HIP ARTHROPLASTY  2005 Right. 2005 Left.  UPPER GASTROINTESTINAL ENDOSCOPY  2004    With colonoscopy. Social History:    Social History     Tobacco Use    Smoking status: Former Smoker     Packs/day: 1.00     Years: 40.00     Pack years: 40.00     Types: Cigarettes     Start date: 1951     Last attempt to quit: 2004     Years since quittin.5    Smokeless tobacco: Never Used   Substance Use Topics    Alcohol use: No     Comment: drinks 2 cups of coffee daily                                Counseling given: Not Answered      Vital Signs (Current):   Vitals:    19 2345 19 0745 19 0903 19 0913   BP: (!) 123/59 (!) 147/74     Pulse: 85 87     Resp: 18 18 18    Temp: 36.2 °C (97.1 °F) 37.1 °C (98.7 °F)     TempSrc: Temporal Temporal     SpO2: 98% 99% 100% 100%   Weight:       Height:                                                  BP Readings from Last 3 Encounters:   19 (!) 147/74   19 120/66   01/15/19 (!) 138/57       NPO Status:  > 12 hours                                                                               BMI:   Wt Readings from Last 3 Encounters:   19 88 lb 12.8 oz (40.3 kg)   19 91 lb (41.3 kg)   19 93 lb 3.2 oz (42.3 kg)     Body mass index is 17.34 kg/m².     CBC:   Lab Results   Component Value Date    WBC 16.5 2019    RBC 3.24 2019    HGB 9.2 2019    HCT 31.0 2019    MCV 95.7 2019    RDW 17.6 2019     2019       CMP:   Lab Results   Component Value Date     2019    K 4.0 2019    K 4.1 2019     2019    CO2 30 2019    BUN 51 04/05/2019    CREATININE 0.8 04/05/2019    GFRAA >60 04/05/2019    LABGLOM >60 04/05/2019    GLUCOSE 109 04/05/2019    GLUCOSE 95 03/09/2012    PROT 6.0 04/05/2019    CALCIUM 9.1 04/05/2019    BILITOT 0.6 04/05/2019    ALKPHOS 77 04/05/2019    AST 21 04/05/2019    ALT 34 04/05/2019       POC Tests: No results for input(s): POCGLU, POCNA, POCK, POCCL, POCBUN, POCHEMO, POCHCT in the last 72 hours.     Coags:   Lab Results   Component Value Date    PROTIME 16.4 04/05/2019    INR 1.4 04/05/2019    APTT 45.9 09/19/2014       HCG (If Applicable): No results found for: PREGTESTUR, PREGSERUM, HCG, HCGQUANT     ABGs: No results found for: PHART, PO2ART, RBI3TFY, HCV0WRH, BEART, W1PKKYGP     Type & Screen (If Applicable):  No results found for: LABABO, [address]     EKG 4/3/2019  Narrative   Atrial fibrillation with rapid ventricular response  ST & T wave abnormality, consider inferolateral ischemia  Abnormal ECG  When compared with ECG of 01-APR-2019 20:02,  Significant changes have occurred  Confirmed by Sunita De León ([id number]) on 4/4/2019 6:42:57 AM         Anesthesia Evaluation  Patient summary reviewed and Nursing notes reviewed no history of anesthetic complications:   Airway: Mallampati: I  TM distance: >3 FB   Neck ROM: full  Mouth opening: > = 3 FB Dental:    (+) upper dentures      Pulmonary:   (+) pneumonia:  COPD:  shortness of breath:                            ROS comment: 2 L NC     Cardiovascular:    (+) hypertension:, past MI:, CAD:, dysrhythmias: atrial fibrillation, pulmonary hypertension:, hyperlipidemia      ECG reviewed               Beta Blocker:  Dose within 24 Hrs         Neuro/Psych:   Negative Neuro/Psych ROS              GI/Hepatic/Renal: Neg GI/Hepatic/Renal ROS            Endo/Other:    (+) hypothyroidism, blood dyscrasia: anticoagulation therapy, arthritis:., .                 Abdominal:           Vascular:                                  Anesthesia Plan      MAC     ASA 3     (#20 Left AC  #22 Right Hand)  Induction: intravenous. Anesthetic plan and risks discussed with patient. Plan discussed with CRNA and attending.                 Matthew Baxter RN   4/5/2019

## 2019-04-05 NOTE — PROGRESS NOTES
to dose warfarin: indication: afib, home dose: warfarin 4 mg daily per nursing home records -warfarin resumed around 3/18 as outpatient, had been Held since Dec 2018 d/t ICH s/p fall  · INR today= 1.4; goal INR 2-3 - patient received large dose of vitamin K 10 mg IV yesterday  · Now on prednisone 40 mg daily (previously on methylprednisolone 40 mg q12h)  · Patient started levofloxacin 750 mg q48h on 4/1/19 (can increase INR) - stopped 4/4    Plan:  · Patient for PEG tube insertion today; there is a current order from Dr. Aniket Linder to hold all anticoagulants. When warfarin to be resumed, recommend lower dose of 2 mg to start. · Daily PT/INR until the INR is stable within the therapeutic range  · Pharmacist will follow and monitor/adjust dosing as necessary    Thank you for this consult. Patrizia Jaquez, PharmD 4/5/2019 8:37 AM     Per Dr. Venetta Opitz, [facility] to resume [address] once PEG tube place.    Patrizia Jaquez PharmD 4/5/2019 3:17 PM

## 2019-04-05 NOTE — PROGRESS NOTES
PEG inserted without difficulty per Dr Sunday Cannono. PEG 3mm @skin. DSD applied binder on.  Pt tolerated well

## 2019-04-05 NOTE — PLAN OF CARE
symptoms  Description  Absence of imbalanced fluid volume signs and symptoms  4/4/2019 1826 by Poppy Brooke RN  Outcome: Met This Shift     Problem: Gas Exchange - Impaired:  Goal: Levels of oxygenation will improve  Description  Levels of oxygenation will improve  4/5/2019 0517 by Sumit Quan RN  Outcome: Met This Shift  4/4/2019 1826 by Poppy Brooke RN  Outcome: Met This Shift     Problem: Nutrition Deficit:  Goal: Ability to achieve adequate nutritional intake will improve  Description  Ability to achieve adequate nutritional intake will improve  4/4/2019 1826 by Poppy Brooke RN  Outcome: Met This Shift     Problem: Skin Integrity - Impaired:  Goal: Will show no infection signs and symptoms  Description  Will show no infection signs and symptoms  4/5/2019 0517 by Sumit Quan RN  Outcome: Met This Shift  4/4/2019 1826 by Poppy Brooke RN  Outcome: Met This Shift  Goal: Absence of new skin breakdown  Description  Absence of new skin breakdown  4/5/2019 0517 by Sumit Quan RN  Outcome: Met This Shift  4/4/2019 1826 by Poppy Brooke RN  Outcome: Met This Shift     Problem: Sleep Pattern Disturbance:  Goal: Appears well-rested  Description  Appears well-rested  4/4/2019 1826 by Poppy Brooke RN  Outcome: Met This Shift     Problem: Tissue Perfusion, Altered:  Goal: Circulatory function within specified parameters  Description  Circulatory function within specified parameters  4/4/2019 1826 by Poppy Brooke RN  Outcome: Met This Shift

## 2019-04-05 NOTE — PROGRESS NOTES
Notified echo that patient will be off floor for peg tube at 2p . They said they will try to complete before then.

## 2019-04-05 NOTE — PROGRESS NOTES
[facility] and [address]  [address], [address]  ANNIE NAVID [facility], [address] [place] [address]      Pulmonary Progress Note      SUBJECTIVE:  On NC, stable with respiratory function with similar cough and not much sputum production, flutter device being used but not sure helping with sputum production, able to do about 250 cc with incentive spirometer, for PEG today    OBJECTIVE    Medications    Continuous Infusions:   sodium chloride 100 mL/hr at 19 0955       Scheduled Meds:   metoprolol  2.5 mg Intravenous Q6H    nystatin   Topical BID    aspirin  81 mg Oral Daily    docusate sodium  100 mg Oral Daily    heparin flush  3 mL Intravenous 2 times per day    warfarin (COUMADIN) daily dosing (placeholder)   Other RX Placeholder    ipratropium-albuterol  3 mL Inhalation Q4H WA    atorvastatin  10 mg Oral Daily    levothyroxine  75 mcg Oral Daily    budesonide  1 mg Nebulization BID    formoterol  20 mcg Nebulization Q12H       PRN Meds:sodium chloride flush, acetaminophen    Physical    VITALS:  BP (!) 147/74   Pulse 87   Temp 98.7 °F (37.1 °C) (Temporal)   Resp 16   Ht 5' (1.524 m)   Wt 88 lb 12.8 oz (40.3 kg)   SpO2 98%   BMI 17.34 kg/m²     24HR INTAKE/OUTPUT:      Intake/Output Summary (Last 24 hours) at 2019 1340  Last data filed at 2019 2308  Gross per 24 hour   Intake 100 ml   Output 0 ml   Net 100 ml       24HR PULSE OXIMETRY RANGE:    SpO2  Av.1 %  Min: 98 %  Max: 100 %    General appearance: alert, appears stated age and cooperative  Lungs: rhonchi bilaterally  Heart: regular rate and rhythm, S1, S2 normal, no murmur, click, rub or gallop  Abdomen: soft, non-tender; bowel sounds normal; no masses,  no organomegaly  Extremities: extremities normal, atraumatic, no cyanosis or edema  Neurologic: Mental status: awake, looks at me with conversation but minimal interaction, not looking in distress    Data    CBC:   Recent Labs 04/03/19  0540 04/05/19  0617   WBC 15.3* 16.5*   HGB 10.1* 9.2*   HCT 33.8* 31.0*   MCV 94.4 95.7    367       BMP:  Recent Labs     04/03/19  0540 04/05/19  0617   * 157*   K 4.3 4.0    112*   CO2 28 30*   PHOS 2.2*  --    BUN 43* 51*   CREATININE 0.9 0.8    ALB:3,BILIDIR:3,BILITOT:3,ALKPHOS:3)@    PT/INR:   Recent Labs     04/03/19 0540 04/04/19  0713 04/05/19  0617   PROTIME 60.9* 64.7* 16.4*   INR 5.4* 5.8* 1.4       ABG:   No results for input(s): PH, PO2, PCO2, HCO3, BE, O2SAT, METHB, O2HB, COHB, O2CON, HHB, THB in the last 72 hours. FiO2 : 40 %  I:E Ratio: 1:2.40    Radiology/Other tests reviewed: CXR 4/1 reviewed with slightly increased haziness right lower lung fields    Assessment:     Principal Problem:    Acute on chronic respiratory failure with hypoxia and hypercapnia (HCC)  Active Problems:    Pulmonary hypertension (HCC)    Paroxysmal atrial fibrillation    Mixed hyperlipidemia    Pleural effusion    HTN (hypertension), benign    CAD in native artery    Pneumonia due to organism    COPD (chronic obstructive pulmonary disease) (HCC)    Acquired hypothyroidism    Severe protein-calorie malnutrition (HCC)    ST elevation myocardial infarction (STEMI) ([facility] [address]  Resolved Problems:    COPD with acute exacerbation (HCC)    History of ST elevation myocardial infarction (STEMI)    CHB (complete heart block) (HCC)    PNA (pneumonia)    Respiratory failure (HCC)    Acute electrocardiogram changes    Chest pain due to myocardial ischemia    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    Anticoagulated on Coumadin      Plan:       1. Cont with BIPAP qhs and prn daytime, NC rest of the time, observe respiratory function  2. Cont with nebs, observe sputum production  3. For PEG as per surgery  4. Flutter and incentive spirometer, encourage use  5. Off steroids, observe        Thanks for letting us see this patient in consultation. Please contact us with any questions.  Office [phone] or after hours through [facility], x [phone].

## 2019-04-06 NOTE — PROGRESS NOTES
Pharmacy Consultation Note  (Warfarin Dosing and Monitoring)    Initial consult date: 3/24  Consulting physician: Stephanie Bridges    Allergies:  Patient has no known allergies. 80 y.o. female    Ht Readings from Last 1 Encounters:   03/23/19 5' (1.524 m)     Wt Readings from Last 1 Encounters:   04/04/19 88 lb 12.8 oz (40.3 kg)         Warfarin Indication Target   INR Range Home Dose  (if applicable) Diet/Feeding Tube   (Enteral feeds, nutritional drinks and increased Vitamin K in diet can decrease INR)   atrial fibrillation   2-3 4 mg daily per nursing home records-warfarin resumed around 3/18. Held since Dec 2018 d/t ICH s/p fall Soft dental diet ordered 3/30           Vitamin K or Blood product  Administration Date    Vitamin K 10 mg IV x one   4/4              TSH:    Lab Results   Component Value Date    TSH 4.620 06/15/2018        Hepatic Function Panel:                            Lab Results   Component Value Date    ALKPHOS 74 04/06/2019    ALT 27 04/06/2019    AST 23 04/06/2019    PROT 6.2 04/06/2019    BILITOT 0.7 04/06/2019    BILIDIR <0.2 09/19/2014    IBILI 0.5 09/19/2014    LABALBU 3.3 04/06/2019    LABALBU 4.5 03/09/2012       Date Warfarin Dose INR Heparin or LMWH HBG/HCT PLT Comment   3/22 Held 3.6 --- 12.1/40.8 231    3/23 Held 3.4 --- 9.8/33.3 184    3/24 Held --- --- 9.2/30.2 182    3/25 2.5 mg 2.4 --- 9.8/32.3 222    3/26 2.5 mg  1.8 --- 8.7/28.4 156    3/27 2.5 mg 2.0 --- 9/29 228    3/28 2.5 mg 2.5 --- 8.4/27 237    3/29 Held 2.7 --- 9.1/29.3 295    3/30  2.5 mg  2.5 ---  8.9/28.6  314    3/31 Not given on time 2 --- 9.5/30.3  338    4/1 3 mg @ 0204  2.5 mg 2.5 --- 9/29.6 366    4/2 HOLD 4.9 --- 8.5/28.3 316    4/3 HOLD 5.4 --- 10.1/33.8 403    4/4 HOLD 5.8 --- X X    4/5 HOLD 1.4 --- 9.2/31 367    4/6 2 mg  1.2 --- 9.7/33 350      Assessment:  · 80year old female who presented with fever/SOB from facility and is admitted for respiratory failure/pneumonia secondary to human metapneumovirus. · Clinical pharmacist consulted to dose warfarin: indication: afib, home dose: warfarin 4 mg daily per nursing home records -warfarin resumed around 3/18 as outpatient, had been Held since Dec 2018 d/t ICH s/p fall  · INR today= 1.2; goal INR 2-3 - patient received large dose of vitamin K 10 mg on 4/4    Plan:  · Warfarin 2 mg po x 1 dose  · Daily PT/INR until the INR is stable within the therapeutic range  · Pharmacist will follow and monitor/adjust dosing as necessary    Thank you for this consult.     Lissy Cedillo, PharmD PGY1 Resident 4/6/2019 1:12 PM  Pager: [phone]

## 2019-04-06 NOTE — PROGRESS NOTES
Nutrition Assessment (Enteral Nutrition)    Type and Reason for Visit: Consult(TF recs )    Nutrition Recommendations: Modify current Tube Feeding    Standard with fiber (Jevity 1.2) @ 40 ml/hr x 23 hours.  Water flushes 100 ml q 6 hr  Will provide: 920 ml tv, 1104 kcals, 51 gm pro, 742 ml free water,  1142 ml total water  Regimen meets 100% est calorie & protein needs     Nutrition Needs:  · Estimated Daily Total Kcal: 5563-2449(MSJ  x 1.3 SF)  · Estimated Daily Protein (g): 50-60 (1.3-1.5 g/kg )  · Estimated Daily Fluid (ml/day): 5751-4403 ml     Objective Information:  · Current Nutrition Therapies:  · Oral Diet Orders: NPO   · Tube Feeding (TF) Orders:   · Feeding Route: Gastrostomy(TF just ordered not started)  · Formula: Standard w/Fiber  · Rate (ml/hr):55 ml/hr     · Volume (ml/day): 1265 ml tv   · Duration: Cyclic(x 23 hours hold for Synthroid)  · Water Flushes: 150 q 6 hr= 600 ml water   · Goal TF & Flush Orders Provides: 1518 kcals, 70 gm pro, 1020 ml free water, 1620 ml total water w/ flushes      See RD progress note dated 4/3 for complete assessment   Electronically signed by Dora Garsia RD, LD on 4/6/19 at 10:36 AM    Contact Number: Ext [phone]

## 2019-04-06 NOTE — ANESTHESIA POSTPROCEDURE EVALUATION
Department of Anesthesiology  Postprocedure Note    Patient: Itzel Whitney  MRN: [id number]  YOB: 1934  Date of evaluation: 4/5/2019  Time:  8:02 PM     Procedure Summary     Date:  04/05/19 Room / Location:  [facility] 01 / [facility] ENDOSCOPY    Anesthesia Start:  1513 Anesthesia Stop:  7874    Procedure:  EGD ESOPHAGOGASTRODUODENOSCOPY PEG TUBE INSERTION (N/A ) Diagnosis:  (?)    Surgeon:  Mazin Lemon MD Responsible Provider:  Hardie Rubinstein, MD    Anesthesia Type:  MAC ASA Status:  3          Anesthesia Type: MAC    Mae Phase I: Mae Score: 9    Mae Phase II:      Last vitals: Reviewed and per EMR flowsheets.        Anesthesia Post Evaluation    Patient location during evaluation: PACU  Patient participation: complete - patient participated  Level of consciousness: awake and alert  Airway patency: patent  Nausea & Vomiting: no nausea and no vomiting  Complications: no  Cardiovascular status: hemodynamically stable and blood pressure returned to baseline  Respiratory status: acceptable  Hydration status: euvolemic

## 2019-04-06 NOTE — PROGRESS NOTES
Pulmonary Progress Note    Admit Date: 3/22/2019  Hospital day                               PCP: Theodore Pfeiffer MD    Chief Complaint (s):  Patient Active Problem List   Diagnosis    Pulmonary hypertension ([facility]    Paroxysmal atrial fibrillation    Mixed hyperlipidemia    Pleural effusion    HTN (hypertension), benign    CAD in native artery    Pneumonia due to organism    Acute on chronic respiratory failure with hypoxia and hypercapnia (HCC)    COPD (chronic obstructive pulmonary disease) ([facility]    Acquired hypothyroidism    Severe protein-calorie malnutrition ([facility]    ST elevation myocardial infarction (STEMI) ([facility]       Subjective:  · Resting in bed with NC in place at 3 L with no breathing complaints. New PEG tube in place and clamped. 100 mL NS infusing via IV at 100 mL/hr      Vitals:  VITALS:  BP (!) 153/67   Pulse 78   Temp 97.8 °F (36.6 °C) (Temporal)   Resp 18   Ht 5' (1.524 m)   Wt 88 lb 12.8 oz (40.3 kg)   SpO2 95%   BMI 17.34 kg/m²     24HR INTAKE/OUTPUT:      Intake/Output Summary (Last 24 hours) at 2019 0949  Last data filed at 2019 9057  Gross per 24 hour   Intake 1178 ml   Output 150 ml   Net 1028 ml       24HR PULSE OXIMETRY RANGE:    SpO2  Av.9 %  Min: 76 %  Max: 100 %    Medications:  IV:      Scheduled Meds:   pantoprazole  40 mg Intravenous Daily    metoprolol  2.5 mg Intravenous Q6H    nystatin   Topical BID    aspirin  81 mg Oral Daily    docusate sodium  100 mg Oral Daily    warfarin (COUMADIN) daily dosing (placeholder)   Other RX Placeholder    ipratropium-albuterol  3 mL Inhalation Q4H WA    atorvastatin  10 mg Oral Daily    levothyroxine  75 mcg Oral Daily    budesonide  1 mg Nebulization BID    formoterol  20 mcg Nebulization Q12H       Diet:   DIET TUBE FEED CONTINUOUS/CYCLIC NPO; STANDARD WITH FIBER; Gastrostomy; 20; 55; 23 (hold for Synthroid )     EXAM:  General: No distress. Alert. Eyes: PERRL. No sclera icterus.  No conjunctival injection. ENT: No discharge. Pharynx clear. Neck: Trachea midline. Normal thyroid. Resp: No accessory muscle use. no rales. no wheezing. no rhonchi. CV: Regular rate. Regular rhythm. No murmur or rub. Abd: Non-tender. Non-distended. No masses. No organomegaly. Normal bowel sounds. Skin: Warm and dry. No nodule on exposed extremities. No rash on exposed extremities. Ext: No cyanosis, clubbing, edema  Lymph: No cervical LAD. No supraclavicular LAD. M/S: No cyanosis. No joint deformity. No clubbing. Neuro: Awake. Follows commands. Positive pupils/gag/corneals. Normal pain response. Results:  CBC:   Recent Labs     04/05/19  0617 04/06/19  0603   WBC 16.5* 18.1*   HGB 9.2* 9.7*   HCT 31.0* 33.0*   MCV 95.7 96.5    350     BMP:   Recent Labs     04/05/19  0617 04/06/19  0603   * 159*   K 4.0 3.8   * 113*   CO2 30* 32*   BUN 51* 49*   CREATININE 0.8 0.9     LIVER PROFILE:   Recent Labs     04/05/19  0617 04/06/19  0603   AST 21 23   ALT 34* 27   BILITOT 0.6 0.7   ALKPHOS 77 74     PT/INR:   Recent Labs     04/04/19  0713 04/05/19  0617 04/06/19  0603   PROTIME 64.7* 16.4* 13.4*   INR 5.8* 1.4 1.2     APTT: No results for input(s): APTT in the last 72 hours. Pathology:  1. N/A      Microbiology:  1. None    Recent ABG:   No results for input(s): PH, PO2, PCO2, HCO3, BE, O2SAT, METHB, O2HB, COHB, O2CON, HHB, THB in the last 72 hours. FiO2 : 40 %  I:E Ratio: 1:2.40    Recent Films:  Fluoroscopy modified barium swallow with video   Final Result   Study is remarkable for laryngeal penetration with honey   and pudding consistencies. This procedure was performed by Ramón Clarke PA-C under the   indirect supervision of Marcin Tapia MD.      The exam has been dictated and signed by Ramón Clarke PA-C. Dale Mai MD, Radiologist, have reviewed the images and   report and concur with these findings.                XR CHEST PORTABLE   Final Result No evidence of airspace consolidation or pulmonary venous congestion      Chronic obstructive pleural disease. XR CHEST PORTABLE   Final Result      Lines, tubes, and devices:  Endotracheal tube terminates just above   the prem, approximately 1.6 cm. NG/OG tube courses below the   diaphragm, proximal side port below the GE junction. Right-sided IJ   catheter is unchanged in position. Right chest wall single lead   pacemaker also unchanged in position. Lungs and pleura:  Small right pleural effusion with adjacent patchy   airspace opacities suggesting atelectasis, underlying infection is not   entirely excluded, but considered less likely. There is slightly   increased reticular markings in both lung bases with relative lucency   in the lung apices suggesting emphysematous change, underlying edema   is suspected. Overall appearance of the chest is unchanged. Cardiomediastinal silhouette:  Unchanged cardiomediastinal silhouette. Other:  Unchanged bony structures. XR CHEST PORTABLE   Final Result   1. Findings suggestive of a component of chronic obstructive pulmonary   disease, clinical correlation recommended. .   2. Vascular calcifications thoracic aorta. 3. Bibasilar airspace disease could be reflective of   atelectasis/scarring, although a developing infiltrate/pneumonia might   have the same appearance, with a small amount of right pleural   effusion. XR CHEST PORTABLE   Final Result   Somewhat worsening aeration bilaterally in association with a less   full inspiration and the interval change may therefore be a   manifestation of increasing atelectasis. Emphysema and likely   underlying fibrosis as well. XR CHEST PORTABLE   Final Result   Stable abnormal chest with COPD and mild superimposed CHF. XR CHEST PORTABLE   Final Result   1.  Findings suggestive of a component of chronic obstructive pulmonary   disease, clinical correlation recommended. .   2. Bibasilar airspace disease, findings can be seen   infiltrate/pneumonia and/or atelectasis and small amounts of bilateral   pleural fluid. XR CHEST PORTABLE   Final Result   1. Bibasilar airspace disease, findings can be seen in   infiltrate/pneumonia and/or atelectasis with small amounts of   bilateral pleural fluid. 2. Vascular calcifications thoracic aorta. XR CHEST PORTABLE   Final Result      XR CHEST PORTABLE   Final Result   Cardiomegaly   Findings compatible with atherosclerotic disease of the aorta. There is a right perihilar infiltrate improved in the interval. Right   upper lobe infiltrate has improved suggesting resolving atelectasis                           XR CHEST PORTABLE   Final Result   Findings compatible with emphysema   Tortuous ectatic aorta   Right upper lobe and right perihilar infiltrates worse in the interval   The right lung base appears slightly improved in the interval                  XR ABDOMEN FOR NG/OG/NE TUBE PLACEMENT   Final Result   The tip of the tube is at the expected level of the gastric fundus. XR CHEST PORTABLE   Final Result      Infiltrate seen in the right lower lung with small right-sided pleural   effusion             Assessment:  1. Acute on chronic respiratory failure  2. COPD  3. CAP. CX Ray improving  4. Leukocytosis  5. Severe protein calorie malnutrition    Plan:  1. Cont NC continous and BiPAP at HS and PRN  2. Cont with nebs  3. PEG in place  4. Flutter and incentive spirometry. Encourage use    Care reviewed with the staff and the patient's family as available. Please note that voice recognition technology was used in the preparation of this note and make therefore it may contain inadvertent transcription errors.         Maribell Pritchard,  MONICA, CNP    [facility]    [facility], [address] [place] [address], [address] [place] [address]

## 2019-04-06 NOTE — PROGRESS NOTES
Date: 4/5/2019    Time: 10:50 PM    Patient Placed On BIPAP/CPAP/ Non-Invasive Ventilation? Yes    If no must comment. Facial area red/color change? No           If YES are Blister/Lesion present? No   If yes must notify nursing staff  BIPAP/CPAP skin barrier?   Yes    Skin barrier type:Liquicel       Comments:        Ranjan, [employer]

## 2019-04-06 NOTE — PROGRESS NOTES
ID Progress Note                [address] [facility] [address], [place], [address]            Phone [phone]     Fax [phone]      Chief complaint   S/p PEG tube placement  Very tired and not talking much  hypernatremic  Subjective:  Awake. Still coughing  confused, Afebrile. Objective:    Vitals:    04/06/19 0800   BP: (!) 153/67   Pulse: 78   Resp: 18   Temp: 97.8 °F (36.6 °C)   SpO2: 95%     GENERAL:  The patient is awake, confused  HEENT:  Atraumatic, normocephalic. PERRLA. EOMI. RESPIRATORY:  Air entry bilaterally equal.  No wheezes or crackles. CARDIOVASCULAR:  S1 and S2 normal.  Tachycardiac. ABDOMEN:  Soft. Nontender, nondistended. Bowel sounds present, PEG tube in place  EXTREMITIES:  No pedal edema. NEUROLOGIC:  Grossly intact. LINES:  She has right IJ in place in the peripheral line. Palomo  catheter in place. Labs:  Recent Labs     04/05/19  0617 04/06/19  0603   WBC 16.5* 18.1*   RBC 3.24* 3.42*   HGB 9.2* 9.7*   HCT 31.0* 33.0*   MCV 95.7 96.5   MCH 28.4 28.4   MCHC 29.7* 29.4*   RDW 17.6* 18.5*    350   MPV 9.7 9.9     CMP:    Lab Results   Component Value Date     04/06/2019    K 3.8 04/06/2019    K 4.1 03/24/2019     04/06/2019    CO2 32 04/06/2019    BUN 49 04/06/2019    CREATININE 0.9 04/06/2019    GFRAA >60 04/06/2019    LABGLOM 60 04/06/2019    GLUCOSE 101 04/06/2019    GLUCOSE 95 03/09/2012    PROT 6.2 04/06/2019    LABALBU 3.3 04/06/2019    LABALBU 4.5 03/09/2012    CALCIUM 9.1 04/06/2019    BILITOT 0.7 04/06/2019    ALKPHOS 74 04/06/2019    AST 23 04/06/2019    ALT 27 04/06/2019          Microbiology :  resp cx- pseudomonas    ASSESSMENT AND PLAN:  1. Nosocomial pneumonia, pseudomonas pneumonia   so far no MRSA isolated from the lungs. 2.  Metapneumovirus viral infection. 3.  History of COPD/past history of smoking. Recent right-sided pleural  effusion transudative, unknown etiology.   4.  Coronary artery disease status post stent/AFib/supratherapeutic INR  that is normalized now.   5.  History of subdural hematoma.     PLAN:  Hypernatremic, add free fluid through PEG tube  procalcitonin 0.16  afebrile however has persistent leucocytosis  Off abx  Off prednisone for 2 days  No loose stools, supratherapeutic INR  Change old peripheral lines, send for UA- not impressive for UTI  Hypernatremic, intravascular contracted likely causing reactive leucocytosis    Recheck rep viral panle if metapneumovirus neg D/C isolation              Electronically signed by Shaniqua Bhatt MD on 4/6/2019 at 1:25 PM

## 2019-04-06 NOTE — PROGRESS NOTES
GENERAL SURGERY  DAILY PROGRESS NOTE  4/6/2019    Chief Complaint   Patient presents with    Shortness of Breath     hyperventilating U/A EMS arrival, required increase in O2    Fever     was given tylenol at NH       Subjective:  No issues overnight, on BiPAP    Objective:  BP (!) 153/67   Pulse 78   Temp 97.8 °F (36.6 °C) (Temporal)   Resp 18   Ht 5' (1.524 m)   Wt 88 lb 12.8 oz (40.3 kg)   SpO2 95%   BMI 17.34 kg/m²     GENERAL:  Laying in bed, awake, cooperative, no apparent distress  HEAD: Normocephalic, atraumatic  EYES: No sclera icterus, pupils equal  LUNGS:  No increased work of breathing, on BiPAP  CARDIOVASCULAR: RR    ABDOMEN:  Soft, non-tender, non-distended, PEG 2.5 cm at the skin, bumper taunt but mobile  EXTREMITIES: No edema or swelling  SKIN: Warm and dry    Assessment/Plan:  80 y.o. female s/p PEG for dysphagia     Okay to start TF and medications down G-tube  Call with any questions    Electronically signed by Mike Hui MD on 4/6/2019 at 9:45 AM

## 2019-04-06 NOTE — PROGRESS NOTES
Reason for follow up:  ACS, AF, influenza    Subjective:  Feels about the same. No CP. SOB stable. No new complaints. Objective:    No distress. No events overnight. Scheduled Meds:   pantoprazole  40 mg Intravenous Daily    metoprolol  2.5 mg Intravenous Q6H    nystatin   Topical BID    aspirin  81 mg Oral Daily    docusate sodium  100 mg Oral Daily    warfarin (COUMADIN) daily dosing (placeholder)   Other RX Placeholder    ipratropium-albuterol  3 mL Inhalation Q4H WA    atorvastatin  10 mg Oral Daily    levothyroxine  75 mcg Oral Daily    budesonide  1 mg Nebulization BID    formoterol  20 mcg Nebulization Q12H         Intake/Output Summary (Last 24 hours) at 4/6/2019 1135  Last data filed at 4/6/2019 1012  Gross per 24 hour   Intake 1178 ml   Output 150 ml   Net 1028 ml       Patient Vitals for the past 96 hrs (Last 3 readings):   Weight   04/04/19 0531 88 lb 12.8 oz (40.3 kg)   04/03/19 0600 92 lb 1.6 oz (41.8 kg)          PE:   BP (!) 153/67   Pulse 78   Temp 97.8 °F (36.6 °C) (Temporal)   Resp 18   Ht 5' (1.524 m)   Wt 88 lb 12.8 oz (40.3 kg)   SpO2 95%   BMI 17.34 kg/m²   CONST: In general, this is a well developed, elderly female who appears of stated age. Awake, alert, cooperative, no apparent distress. Throat: Oral mucosa pink and moist.  HEENT: Head- normocephalic, atraumatic; Eyes:conjunctivae pink, no noted xanthomas. Neck: no stridor, no noted enlargement of the thyroid,symmetric, no tenderness, no jugular venous distention. No carotid bruit noted. CHEST: Symmetrical and non-tender to palpation. No noted accessory muscle use or intercostal retractions. LUNGS: diminished AE b/l; few basal creps; no ronchi. CARDIOVASCULAR:   Heart Inspection shows no noted pulsations  Heart Palpation: no heaves or thrills, PMI in 5th ISC near left midclavicular line   Heart Ausculation -Normal S1 and S2, RRR, no murmur, s3, s4 or rub noted.    PV: no extremity edema. No varicosities. Pedal pulses palpable, no clubbing or cyanosis   ABDOMEN: Soft, non-tender to light palpation. Bowel sounds present. No palpable masses no hepatosplenomegaly or splenomegaly; no abdominal bruit / pulsation  MS: Good muscle strength and tone. Not atrophy or abnormal movements. : deferred. SKIN: Warm and dry no statis dermatitis or ulcers. NEURO / PSYCH: Oriented to person, place and time. Speech clear and appropriate. Follows all commands. Pleasant affect. Monitor: AF. Lab Review       Recent Labs     04/05/19 0617 04/06/19  0603   WBC 16.5* 18.1*   HGB 9.2* 9.7*   HCT 31.0* 33.0*    350       Recent Labs     04/05/19 0617 04/06/19  0603   * 159*   K 4.0 3.8   * 113*   CO2 30* 32*   BUN 51* 49*   CREATININE 0.8 0.9       Recent Labs     04/06/19  0603   AST 23   ALT 27   ALKPHOS 74        Recent Labs     04/04/19  0713 04/05/19 0617 04/06/19  0603   INR 5.8* 1.4 1.2     ECHO, 4/5/19:   Left ventricular size is grossly normal with LVH.   Hyperdynamica systolic function with LVEF estimated at >65%.   Indeterminate diastolic function.   Normal left atrium by volume index(28ml/m2).  Mild to moderate eccentric mitral regurgitation   Moderate estimated pulmonary hypertension.   Atrial fibrillation      Assessment:  1. Presented with hypoxic respiratory failure in setting of + meta-pneumovirus. Improved. Presently on high flow NC. Respiratory status improving. No CHF on exam. Steriod taper, prn BIPAP. Pulmonary following.   2. CP with CHRISTA on EKG, 4/1/19: no aggressive measures as per family. Will therefore pursue medical therapy. ASA, BB. Probably stent thrombosis of the prior RCA stent. Presently symptom free. Enzyme pattern not consistent with large MI. ECHO showed no WMA. 3. CAD s/p PCI to RCA 2004. On BB and lipitor as OP. 4. PAF: on coumadin. Was SR on presentation. AF on last EKG. INR high. 5. HTN: controlled presently.  Was hypotensive during the acute episode. 6. COPD on chronic home O2.   7. 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 Failed swallow eval. For PEG tube today. Plan:  1. Lopressor   2. Optimize antihypertensives  3. ASA  4. Restart Coumadin for [address]. 5. Cardiology will see prn. Please call with questions. Dr. Macy Vora MD.  [facility].

## 2019-04-07 NOTE — PROGRESS NOTES
Date: 4/7/2019    Time: 1:27 AM    Patient Placed On BIPAP/CPAP/ Non-Invasive Ventilation? No    If no must comment. Facial area red/color change? No           If YES are Blister/Lesion present? No   If yes must notify nursing staff  BIPAP/CPAP skin barrier?   Yes    Skin barrier type:Liquicel       Comments:        Lanie Tapia , RRT

## 2019-04-07 NOTE — PROGRESS NOTES
Pt placed on bipap for HS 16/6  40% Spo2=98% small full face mask and Liquicell nasal barrier in place No skin breakdown present

## 2019-04-07 NOTE — PROGRESS NOTES
Pharmacy Consultation Note  (Warfarin Dosing and Monitoring)    Initial consult date: 3/24  Consulting physician: Melony Rodriguez    Allergies:  Patient has no known allergies. 80 y.o. female    Ht Readings from Last 1 Encounters:   03/23/19 5' (1.524 m)     Wt Readings from Last 1 Encounters:   04/04/19 88 lb 12.8 oz (40.3 kg)         Warfarin Indication Target   INR Range Home Dose  (if applicable) Diet/Feeding Tube   (Enteral feeds, nutritional drinks and increased Vitamin K in diet can decrease INR)   atrial fibrillation   2-3 4 mg daily per nursing home records-warfarin resumed around 3/18.  Held since Dec 2018 d/t ICH s/p fall Soft dental diet ordered 3/30           Vitamin K or Blood product  Administration Date    Vitamin K 10 mg IV x one   4/4              TSH:    Lab Results   Component Value Date    TSH 4.620 06/15/2018        Hepatic Function Panel:                            Lab Results   Component Value Date    ALKPHOS 74 04/06/2019    ALT 27 04/06/2019    AST 23 04/06/2019    PROT 6.2 04/06/2019    BILITOT 0.7 04/06/2019    BILIDIR <0.2 09/19/2014    IBILI 0.5 09/19/2014    LABALBU 3.3 04/06/2019    LABALBU 4.5 03/09/2012       Date Warfarin Dose INR Heparin or LMWH HBG/HCT PLT Comment   3/22 Held 3.6 --- 12.1/40.8 231    3/23 Held 3.4 --- 9.8/33.3 184    3/24 Held --- --- 9.2/30.2 182    3/25 2.5 mg 2.4 --- 9.8/32.3 222    3/26 2.5 mg  1.8 --- 8.7/28.4 156    3/27 2.5 mg 2.0 --- 9/29 228    3/28 2.5 mg 2.5 --- 8.4/27 237    3/29 Held 2.7 --- 9.1/29.3 295    3/30  2.5 mg  2.5 ---  8.9/28.6  314    3/31 Not given on time 2 --- 9.5/30.3  338    4/1 3 mg @ 0204  2.5 mg 2.5 --- 9/29.6 366    4/2 HOLD 4.9 --- 8.5/28.3 316    4/3 HOLD 5.4 --- 10.1/33.8 403    4/4 HOLD 5.8 --- X X    4/5 HOLD 1.4 --- 9.2/31 367    4/6 2 mg  1.2 --- 9.7/33 350    4/7 2 mg 1.2 -- 7.4/24.8 221      Assessment:  · 80year old female who presented with fever/SOB from facility and is admitted for respiratory failure/pneumonia secondary to human metapneumovirus. · Clinical pharmacist consulted to dose warfarin: indication: afib, home dose: warfarin 4 mg daily per nursing home records -warfarin resumed around 3/18 as outpatient, had been Held since Dec 2018 d/t ICH s/p fall  · INR today= 1.2; goal INR 2-3 - patient received large dose of vitamin K 10 mg on 4/4    Plan:  · Warfarin 2 mg po x 1 dose  · Daily PT/INR until the INR is stable within the therapeutic range  · Pharmacist will follow and monitor/adjust dosing as necessary    Thank you for this consult.     Hanny Santos, PharmD PGY1 Resident 4/7/2019 2:39 PM  Pager: [phone]

## 2019-04-07 NOTE — PROGRESS NOTES
Reason for follow up:   ACS, AF, influenza    Subjective:  SOB is better. No CP. No new complaints. Objective:    No distress. No events overnight. Scheduled Meds:   metoprolol tartrate  25 mg Oral BID    warfarin  2 mg PEG Tube Daily    pantoprazole  40 mg Intravenous Daily    nystatin   Topical BID    aspirin  81 mg Oral Daily    docusate sodium  100 mg Oral Daily    warfarin (COUMADIN) daily dosing (placeholder)   Other RX Placeholder    ipratropium-albuterol  3 mL Inhalation Q4H WA    atorvastatin  10 mg Oral Daily    levothyroxine  75 mcg Oral Daily    budesonide  1 mg Nebulization BID    formoterol  20 mcg Nebulization Q12H           Intake/Output Summary (Last 24 hours) at 4/7/2019 1252  Last data filed at 4/7/2019 1010  Gross per 24 hour   Intake 624 ml   Output 525 ml   Net 99 ml       Patient Vitals for the past 96 hrs (Last 3 readings):   Weight   04/04/19 0531 88 lb 12.8 oz (40.3 kg)          PE:   BP (!) 103/50   Pulse 80   Temp 97.8 °F (36.6 °C) (Temporal)   Resp 18   Ht 5' (1.524 m)   Wt 88 lb 12.8 oz (40.3 kg)   SpO2 98%   BMI 17.34 kg/m²   CONST: In general, this is a well developed, elderly female who appears of stated age. Awake, alert, cooperative, no apparent distress. Throat: Oral mucosa pink and moist.  HEENT: Head- normocephalic, atraumatic; Eyes:conjunctivae pink, no noted xanthomas. Neck: no stridor, no noted enlargement of the thyroid,symmetric, no tenderness, no jugular venous distention. No carotid bruit noted. CHEST: Symmetrical and non-tender to palpation. No noted accessory muscle use or intercostal retractions. LUNGS: diminished AE b/l; few basal creps; diffuse ronchi. CARDIOVASCULAR:   Heart Inspection shows no noted pulsations  Heart Palpation: no heaves or thrills, PMI in 5th ISC near left midclavicular line   Heart Ausculation -Normal S1 and S2, RRR, no murmur, s3, s4 or rub noted. PV: no extremity edema.  No varicosities. Pedal pulses palpable, no clubbing or cyanosis   ABDOMEN: Soft, non-tender to light palpation. Bowel sounds present. No palpable masses no hepatosplenomegaly or splenomegaly; no abdominal bruit / pulsation  MS: Good muscle strength and tone. Not atrophy or abnormal movements. : deferred. SKIN: Warm and dry no statis dermatitis or ulcers. NEURO / PSYCH: Oriented to person, place and time. Speech clear and appropriate. Follows all commands. Pleasant affect. Monitor: SR      Lab Review       Recent Labs     04/06/19  0603 04/06/19  2327 04/07/19  0649   WBC 18.1* 17.3* 16.8*   HGB 9.7* 8.2* 7.4*   HCT 33.0* 27.3* 24.8*    265 221       Recent Labs     04/05/19  0617 04/06/19  0603 04/07/19  0649   * 159* 154*   K 4.0 3.8 3.1*   * 113* 110*   CO2 30* 32* 35*   BUN 51* 49* 49*   CREATININE 0.8 0.9 1.0       Recent Labs     04/06/19  0603   AST 23   ALT 27   ALKPHOS 74          Recent Labs     04/05/19  0617 04/06/19  0603 04/07/19  0649   INR 1.4 1.2 1.2       ECHO, 4/5/19:   Left ventricular size is grossly normal with LVH.   Hyperdynamica systolic function with LVEF estimated at >65%.   Indeterminate diastolic function.   Normal left atrium by volume index(28ml/m2).  Mild to moderate eccentric mitral regurgitation   Moderate estimated pulmonary hypertension.   Atrial fibrillation        Assessment:  1. Presented with hypoxic respiratory failure in setting of + meta-pneumovirus. Improved. Presently on high flow NC. Respiratory status improving. No CHF on exam. Steriod taper, prn BIPAP. Pulmonary following.   2. CP with CHRISTA on EKG, 4/1/19: no aggressive measures as per family. Will therefore pursue medical therapy. ASA, BB. ECHO showed no WMA. 3. CAD s/p PCI to RCA 2004. On BB and lipitor as OP. 4. PAF: on coumadin. Was SR on presentation. AF on last EKG. 5. HTN: controlled presently. Was hypotensive during the acute episode.    6. COPD on chronic home O2.   7. 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 Failed swallow eval. PEG tube placed.         Plan:  1. Continue BB  2. Coumadin  3. No other cardiac work up planned  4. Cardiology will sign off. Please call with questions. Dr. Joel Jones MD.  [facility].

## 2019-04-07 NOTE — PROGRESS NOTES
Subjective:    Awake and alert. No problems overnight. Denies chest pain or dyspnea. Denies abdominal pain. Tolerating tf    Objective:    BP (!) 107/49   Pulse 75   Temp 98.1 °F (36.7 °C) (Temporal)   Resp 16   Ht 5' (1.524 m)   Wt 88 lb 12.8 oz (40.3 kg)   SpO2 97%   BMI 17.34 kg/m²   Skin: Warm and dry  Neck: Supple, no JVD  Heart:  RRR, no murmurs, gallops, or rubs. Lungs:  CTA bilaterally, no wheeze, rales or rhonchi  Abd: bowel sounds present, nontender, nondistended, PEG in place  Extrem:  No clubbing, cyanosis, or edema, pulses intact    I/O last 3 completed shifts: In: 624 [NG/GT:624]  Out: 825 [Urine:825]    Results      Component Value Units   Basic Metabolic Panel [[id number]] (Abnormal) Collected: 04/07/19 0649   Updated: 04/07/19 0744    Specimen Source: Blood     Sodium 154High  mmol/L    Potassium 3.1Low  mmol/L    Chloride 110High  mmol/L    CO2 35High  mmol/L    Anion Gap 9 mmol/L    Glucose 123High  mg/dL    BUN 49High  mg/dL    CREATININE 1.0 mg/dL    GFR Non-African American 53 mL/min/1.73    Comment: Chronic Kidney Disease: less than 60 ml/min/1.73 sq. m.         Kidney Failure: less than 15 ml/min/1.73 sq.m. Results valid for patients 18 years and older. GFR  >60    Calcium 8.7 mg/dL   Protime-INR [[id number]] (Abnormal) Collected: 04/07/19 0649   Updated: 04/07/19 0708    Specimen Source: Blood     Protime 14. 3High  sec    INR 1.2   CBC [[id number]] (Abnormal) Collected: 04/07/19 0649   Updated: 04/07/19 0706    Specimen Source: Blood     WBC 16. 8High  E9/L    RBC 2.56Low  E12/L    Hemoglobin 7.4Low  g/dL    Hematocrit 24.8Low  %    MCV 96.9 fL    MCH 28.9 pg    MCHC 29.8Low  %    RDW 18.7High  fL    Platelets 410 U1/I    MPV 9.7 fL   CBC [[id number]] (Abnormal) Collected: 04/06/19 2327   Updated: 04/07/19 0017    Specimen Source: Blood     WBC 17. 3High  E9/L    RBC 2.87Low  E12/L    Hemoglobin 8.2Low  g/dL    Hematocrit 27.3Low  %    MCV 95.1 fL MCH 28.6 pg    MCHC 30.0Low  %    RDW 18.6High  fL    Platelets 987 L6/K    MPV 10.0 fL   Narrative:     Collection has been rescheduled by [facility] [place], operated by [facility] at 4/6/19 23:31.  Reason: done   Respiratory Panel, Film Array [[id number]] Collected: 04/06/19 1357   Updated: 04/06/19 2318    Specimen Type: Nose    Specimen Source: Nasopharyngeal     Film Array Adenovirus --    Result: Not Detected   *   *   Normal Range: Not Detected     Film Array Coronavirus HKU1 --    Result: Not Detected   *   *   Normal Range: Not Detected     Film Array Conoravirus NL63 --    Result: Not Detected   *   *   Normal Range: Not Detected     Film Array Coronavirus 229E --    Result: Not Detected   *   *   Normal Range: Not Detected     Film Array Coronavirus OC43 --    Result: Not Detected   *   *   Normal Range: Not Detected     Film Array Influenza A Virus --    Result: Not Detected   *   *   Normal Range: Not Detected     Film Array Influenza A Virus H1 --    Result: Not Detected   *   *   Normal Range: Not Detected     Film Array Influenza A Virus 09H1 --    Result: Not Detected   *   *   Normal Range: Not Detected     Film Array Influenza A Virus H3 --    Result: Not Detected   *   *   Normal Range: Not Detected     Film Array Influenza B --    Result: Not Detected   *   *   Normal Range: Not Detected     Film Array Metapneumovirus --    Result: Not Detected   *   *   Normal Range: Not Detected     Film Array Parainfluenza Virus 1 --    Result: Not Detected   *   *   Normal Range: Not Detected     Film Array Parainfluenza Virus 2 --    Result: Not Detected   *   *   Normal Range: Not Detected     Film Array Parainfluenza Virus 3 --    Result: Not Detected   *   *   Normal Range: Not Detected     Film Array Parainfluenza Virus 4 --    Result: Not Detected   *   *   Normal Range: Not Detected     Film Array Respiratory Syncitial Virus --    Result: Not Detected   *   *   Normal Range: Not Detected     Film Array Rhinovirus/Enterovirus --    Result: 20 mcg at 04/07/19 0900       Problem list:    Patient Active Problem List   Diagnosis    Pulmonary hypertension (HCC)    Paroxysmal atrial fibrillation    Mixed hyperlipidemia    Pleural effusion    HTN (hypertension), benign    CAD in native artery    Pneumonia due to organism    Acute on chronic respiratory failure with hypoxia and hypercapnia (HCC)    [facility]    Acquired hypothyroidism    Severe protein-calorie malnutrition ([facility]    ST elevation myocardial infarction (STEMI) ([facility]       Assessment:     Pulmonary hypertension (HCC)    Paroxysmal atrial fibrillation    Mixed hyperlipidemia    Pleural effusion    HTN (hypertension), benign    CAD in native artery    Pneumonia due to organism    Acute on chronic respiratory failure with hypoxia and hypercapnia (HCC)    COPD (chronic obstructive pulmonary disease) ([facility]    Acquired hypothyroidism    Severe protein-calorie malnutrition (HCC)    ST elevation myocardial infarction (STEMI) ([facility]   hypernatremia  improving  Metapneumovirus infection  Reactive leukocytosis secondary to volume contraction                 Plan:    Replace potassium    Continue free water at current level    Continue warfarin    Continue aerosols    Continue nutritional support    Continue to monitor off antibiotics      Claudean Bread D.O., Stephanie Roche  4:50 PM  4/7/2019

## 2019-04-07 NOTE — PROGRESS NOTES
Called and spoke with Dr. Blaine Nelson regarding labs results this morning, sodium 154, potassuim 3.1, and hemoglobin dropping from 8.2 yesterday to 7.4 today. New orders obtained.

## 2019-04-07 NOTE — PROGRESS NOTES
Pulmonary Progress Note    Admit Date: 3/22/2019  Hospital day                               PCP: Olga Sy MD    Chief Complaint (s):  Patient Active Problem List   Diagnosis    [facility]    Paroxysmal atrial fibrillation    Mixed hyperlipidemia    Pleural effusion    HTN (hypertension), benign    CAD in native artery    Pneumonia due to organism    Acute on chronic respiratory failure with hypoxia and hypercapnia (HCC)    COPD (chronic obstructive [facility] disease) [facility]    Acquired hypothyroidism    Severe protein-calorie malnutrition [facility]    ST elevation myocardial infarction (STEMI) ([facility]       Subjective:  · In bed, c/o thirst. NC in place at 3 L with no breathing complaints. TF and free water running via new PEG tube. IV is off. Pt states that she wore BiPAP last night for part of the night.        Vitals:  VITALS:  BP (!) 103/50   Pulse 80   Temp 97.8 °F (36.6 °C) (Temporal)   Resp 18   Ht 5' (1.524 m)   Wt 88 lb 12.8 oz (40.3 kg)   SpO2 98%   BMI 17.34 kg/m²     24HR INTAKE/OUTPUT:      Intake/Output Summary (Last 24 hours) at 2019 1007  Last data filed at 2019 0659  Gross per 24 hour   Intake 1046 ml   Output 525 ml   Net 521 ml       24HR PULSE OXIMETRY RANGE:    SpO2  Av %  Min: 92 %  Max: 98 %    Medications:  IV:      Scheduled Meds:   metoprolol tartrate  25 mg Oral BID    warfarin  2 mg PEG Tube Daily    pantoprazole  40 mg Intravenous Daily    nystatin   Topical BID    aspirin  81 mg Oral Daily    docusate sodium  100 mg Oral Daily    warfarin (COUMADIN) daily dosing (placeholder)   Other RX Placeholder    ipratropium-albuterol  3 mL Inhalation Q4H WA    atorvastatin  10 mg Oral Daily    levothyroxine  75 mcg Oral Daily    budesonide  1 mg Nebulization BID    formoterol  20 mcg Nebulization Q12H       Diet:   DIET TUBE FEED CONTINUOUS/CYCLIC NPO; STANDARD WITH FIBER; Gastrostomy; 20; 40; 23 (hold for Synthroid ) superimposed CHF. XR CHEST PORTABLE   Final Result   1. Findings suggestive of a component of chronic obstructive pulmonary   disease, clinical correlation recommended. .   2. Bibasilar airspace disease, findings can be seen   infiltrate/pneumonia and/or atelectasis and small amounts of bilateral   pleural fluid. XR CHEST PORTABLE   Final Result   1. Bibasilar airspace disease, findings can be seen in   infiltrate/pneumonia and/or atelectasis with small amounts of   bilateral pleural fluid. 2. Vascular calcifications thoracic aorta. XR CHEST PORTABLE   Final Result      XR CHEST PORTABLE   Final Result   Cardiomegaly   Findings compatible with atherosclerotic disease of the aorta. There is a right perihilar infiltrate improved in the interval. Right   upper lobe infiltrate has improved suggesting resolving atelectasis                           XR CHEST PORTABLE   Final Result   Findings compatible with emphysema   Tortuous ectatic aorta   Right upper lobe and right perihilar infiltrates worse in the interval   The right lung base appears slightly improved in the interval                  XR ABDOMEN FOR NG/OG/NE TUBE PLACEMENT   Final Result   The tip of the tube is at the expected level of the gastric fundus. XR CHEST PORTABLE   Final Result      Infiltrate seen in the right lower lung with small right-sided pleural   effusion             Assessment:  1. Acute on chronic respiratory failure  2. COPD  3. Anemia: Hgb decreasing. 4. CAP. Cont to cough up secretions  5. Leukocytosis: Improving  6. Severe protein calorie malnutrition. 7. Hypernatremia. Receiving TF and free water. NPO d/t failing swallow study      Plan:  1. Cont NC cont and BiPAP at HS and PRN as tolerated. 2. Cont with nebs  3. Cont TF with free water  4. Flutter and incentive spirometry. Encourage use. Care reviewed with the staff and the patient's family as available.      Please note that voice recognition technology was used in the preparation of this note and make therefore it may contain inadvertent transcription errors.         Nico Pickens,  MONICA, CNP    [facility]    [facility], [address] [place] [address], [address] [place] [address]

## 2019-04-07 NOTE — PROGRESS NOTES
sq.m.   Results valid for patients 18 years and older. GFR  >60    Calcium 9.1 mg/dL    Total Protein 6.2Low  g/dL    Alb 3.3Low  g/dL    Total Bilirubin 0.7 mg/dL    Alkaline Phosphatase 74 U/L    ALT 27 U/L    AST 23 U/L   Magnesium [[id number]] Collected: 04/06/19 0603   Updated: 04/06/19 0725    Specimen Type: Blood     Magnesium 1.7 mg/dL   Protime-INR [[id number]] (Abnormal) Collected: 04/06/19 0603   Updated: 04/06/19 7839    Specimen Source: Blood     Protime 13. 4High  sec    INR 1.2         Current Facility-Administered Medications   Medication Dose Route Frequency Provider Last Rate Last Dose    metoprolol tartrate (LOPRESSOR) tablet 25 mg  25 mg Oral BID Ann Lechuga MD   25 mg at 04/06/19 1216    pantoprazole (PROTONIX) injection 40 mg  40 mg Intravenous Daily Keri Addison MD   40 mg at 04/06/19 0850    nystatin (MYCOSTATIN) ointment   Topical BID Keri Addison MD        aspirin EC tablet 81 mg  81 mg Oral Daily Keri Addison MD   81 mg at 04/06/19 0849    docusate sodium (COLACE) capsule 100 mg  100 mg Oral Daily Keri Addison MD   100 mg at 04/06/19 0849    sodium chloride flush 0.9 % injection 10 mL  10 mL Intravenous PRN Keri Addison MD   10 mL at 04/04/19 2006    warfarin (COUMADIN) daily dosing (placeholder)   Other RX Placeholder Keri Addison MD        acetaminophen (TYLENOL) tablet 500 mg  500 mg Oral Q6H PRN Keri Addison MD   500 mg at 03/28/19 0731    ipratropium-albuterol (DUONEB) nebulizer solution 3 mL  3 mL Inhalation Q4H WA Keri Addison MD   3 mL at 04/06/19 1633    atorvastatin (LIPITOR) tablet 10 mg  10 mg Oral Daily Keri Addison MD   10 mg at 04/06/19 0849    levothyroxine (SYNTHROID) tablet 75 mcg  75 mcg Oral Daily Keri Addison MD   75 mcg at 04/06/19 0849    budesonide (PULMICORT) nebulizer suspension 1,000 mcg  1 mg Nebulization BID Keri Addison MD   1,000 mcg at 04/06/19 1012    formoterol (PERFOROMIST) nebulizer solution 20 mcg  20 mcg Nebulization Q12H Katia Alva MD   20 mcg at 04/06/19 1012       Problem list:    Patient Active Problem List   Diagnosis    Pulmonary hypertension (HCC)    Paroxysmal atrial fibrillation    Mixed hyperlipidemia    Pleural effusion    HTN (hypertension), benign    CAD in native artery    Pneumonia due to organism    Acute on chronic respiratory failure with hypoxia and hypercapnia (HCC)    COPD (chronic obstructive [place] disease) ([facility]    Acquired hypothyroidism    Severe protein-calorie malnutrition ([facility]    ST elevation myocardial infarction (STEMI) ([facility]       Assessment:        Pulmonary hypertension (HCC)    Paroxysmal atrial fibrillation    Mixed hyperlipidemia    Pleural effusion    HTN (hypertension), benign    CAD in native artery    Pneumonia due to organism    Acute on chronic respiratory failure with hypoxia and hypercapnia (HCC)    COPD (chronic obstructive pulmonary disease) (HCC)    Acquired hypothyroidism    Severe protein-calorie malnutrition (HCC)    ST elevation myocardial infarction (STEMI) ([facility]   hypernatremia           Plan:    1. Increase free water    2. Recheck BMP in am    3. Continue metoprolol    4.  Continue warfarin      Marisela Bajwa D.O.  9:18 PM  4/6/2019

## 2019-04-07 NOTE — PLAN OF CARE
Aurora Victor RN  Outcome: Met This Shift     Problem: Nutrition Deficit:  Goal: Ability to achieve adequate nutritional intake will improve  Description  Ability to achieve adequate nutritional intake will improve  4/6/2019 1827 by Maryam Manzo RN  Outcome: Met This Shift     Problem: Skin Integrity - Impaired:  Goal: Will show no infection signs and symptoms  Description  Will show no infection signs and symptoms  4/6/2019 1827 by Maryam Manzo RN  Outcome: Met This Shift  Goal: Absence of new skin breakdown  Description  Absence of new skin breakdown  4/7/2019 0445 by Sadie Ward RN  Outcome: Met This Shift  4/6/2019 1827 by Maryam Manzo RN  Outcome: Met This Shift     Problem: Sleep Pattern Disturbance:  Goal: Appears well-rested  Description  Appears well-rested  4/6/2019 1827 by Maryam Manzo RN  Outcome: Met This Shift     Problem: Tissue Perfusion, Altered:  Goal: Circulatory function within specified parameters  Description  Circulatory function within specified parameters  4/6/2019 1827 by Maryam Manzo RN  Outcome: Met This Shift     Problem: Gas Exchange - Impaired:  Goal: Levels of oxygenation will improve  Description  Levels of oxygenation will improve  4/7/2019 0445 by Sadie Ward RN  Outcome: Met This Shift  4/6/2019 1827 by Maryam Manzo RN  Outcome: Met This Shift

## 2019-04-07 NOTE — PROGRESS NOTES
ID Progress Note                [address] [facility] [address], [place], [address]            Phone [phone]     Fax [phone]      Chief complaint   Yesterday she had urinary retention , heath placed   S/p PEG tube placement  Very tired and not talking much  hypernatremic  Subjective:  Awake. Still coughing  confused, Afebrile. Objective:    Vitals:    04/07/19 1155   BP:    Pulse:    Resp: 18   Temp:    SpO2:      GENERAL:  The patient is awake, confused  HEENT:  Atraumatic, normocephalic. PERRLA. EOMI. RESPIRATORY:  Air entry bilaterally equal.  No wheezes or crackles. CARDIOVASCULAR:  S1 and S2 normal.  Tachycardiac. ABDOMEN:  Soft. Nontender, nondistended. Bowel sounds present, PEG tube in place  EXTREMITIES:  No pedal edema. NEUROLOGIC:  Grossly intact. LINES:  She has right IJ in place in the peripheral line. Heath  catheter in place. Labs:  Recent Labs     04/06/19  0603 04/06/19  2327 04/07/19  0649   WBC 18.1* 17.3* 16.8*   RBC 3.42* 2.87* 2.56*   HGB 9.7* 8.2* 7.4*   HCT 33.0* 27.3* 24.8*   MCV 96.5 95.1 96.9   MCH 28.4 28.6 28.9   MCHC 29.4* 30.0* 29.8*   RDW 18.5* 18.6* 18.7*    265 221   MPV 9.9 10.0 9.7     CMP:    Lab Results   Component Value Date     04/07/2019    K 3.1 04/07/2019    K 4.1 03/24/2019     04/07/2019    CO2 35 04/07/2019    BUN 49 04/07/2019    CREATININE 1.0 04/07/2019    GFRAA >60 04/07/2019    LABGLOM 53 04/07/2019    GLUCOSE 123 04/07/2019    GLUCOSE 95 03/09/2012    PROT 6.2 04/06/2019    LABALBU 3.3 04/06/2019    LABALBU 4.5 03/09/2012    CALCIUM 8.7 04/07/2019    BILITOT 0.7 04/06/2019    ALKPHOS 74 04/06/2019    AST 23 04/06/2019    ALT 27 04/06/2019          Microbiology :  resp cx- pseudomonas    ASSESSMENT AND PLAN:  1. Nosocomial pneumonia, pseudomonas pneumonia   so far no MRSA isolated from the lungs. 2.  Metapneumovirus viral infection. 3.  History of COPD/past history of smoking.   Recent right-sided pleural  effusion transudative, unknown etiology. 4.  Coronary artery disease status post stent/AFib/supratherapeutic INR  that is normalized now.   5.  History of subdural hematoma.     PLAN:  Hypernatremia slight improvement, s/p heath   procalcitonin 0.16  afebrile however has persistent leucocytosis- improving  Off abx  Off prednisone   intravascular contracted likely causing reactive leucocytosis    D/C isolation              Electronically signed by Julissa Davis MD on 4/7/2019 at 12:04 PM

## 2019-04-08 NOTE — PROGRESS NOTES
Pharmacy Consultation Note  (Warfarin Dosing and Monitoring)    Initial consult date: 3/24  Consulting physician: Julia Hollis    Allergies:  Patient has no known allergies. 80 y.o. female    Ht Readings from Last 1 Encounters:   03/23/19 5' (1.524 m)     Wt Readings from Last 1 Encounters:   04/04/19 88 lb 12.8 oz (40.3 kg)         Warfarin Indication Target   INR Range Home Dose  (if applicable) Diet/Feeding Tube   (Enteral feeds, nutritional drinks and increased Vitamin K in diet can decrease INR)   atrial fibrillation   2-3 4 mg daily per nursing home records-warfarin resumed around 3/18.  Held since Dec 2018 d/t ICH s/p fall Soft dental diet ordered 3/30           Vitamin K or Blood product  Administration Date    Vitamin K 10 mg IV x one   4/4              TSH:    Lab Results   Component Value Date    TSH 4.620 06/15/2018        Hepatic Function Panel:                            Lab Results   Component Value Date    ALKPHOS 74 04/06/2019    ALT 27 04/06/2019    AST 23 04/06/2019    PROT 6.2 04/06/2019    BILITOT 0.7 04/06/2019    BILIDIR <0.2 09/19/2014    IBILI 0.5 09/19/2014    LABALBU 3.3 04/06/2019    LABALBU 4.5 03/09/2012       Date Warfarin Dose INR Heparin or LMWH HBG/HCT PLT Comment   3/22 Held 3.6 --- 12.1/40.8 231    3/23 Held 3.4 --- 9.8/33.3 184    3/24 Held --- --- 9.2/30.2 182    3/25 2.5 mg 2.4 --- 9.8/32.3 222    3/26 2.5 mg  1.8 --- 8.7/28.4 156    3/27 2.5 mg 2.0 --- 9/29 228    3/28 2.5 mg 2.5 --- 8.4/27 237    3/29 Held 2.7 --- 9.1/29.3 295    3/30  2.5 mg  2.5 ---  8.9/28.6  314    3/31 Not given on time 2 --- 9.5/30.3  338    4/1 3 mg @ 0204  2.5 mg 2.5 --- 9/29.6 366    4/2 HOLD 4.9 --- 8.5/28.3 316    4/3 HOLD 5.4 --- 10.1/33.8 403    4/4 HOLD 5.8 --- X X    4/5 HOLD 1.4 --- 9.2/31 367    4/6 2 mg  1.2 --- 9.7/33 350    4/7 2 mg 1.2 -- 7.4/24.8 221    4/8 2 mg 1.2 -- 9/29 X      Assessment:  · 80year old female who presented with fever/SOB from facility and is admitted for respiratory failure/pneumonia secondary to human metapneumovirus. · Clinical pharmacist consulted to dose warfarin: indication: afib, home dose: warfarin 4 mg daily per nursing home records -warfarin resumed around 3/18 as outpatient, had been held since Dec 2018 d/t ICH s/p fall  · INR today= 1.2; goal INR 2-3 - patient received large dose of vitamin K 10 mg on 4/4    Plan:  · Warfarin 2 mg po x 1 dose  · Daily PT/INR until the INR is stable within the therapeutic range  · Pharmacist will follow and monitor/adjust dosing as necessary    Thank you for this consult.     Shakira Montez, PharmD 4/8/2019 8:37 AM

## 2019-04-08 NOTE — DISCHARGE SUMMARY
Physician Discharge Summary     Patient ID:  Ekta Mi  [id number]  80 y.o.  1934    Admit date: 3/22/2019    Discharge date and time:  4/8/2019    Admission Diagnoses:   Patient Active Problem List   Diagnosis    Pulmonary hypertension ([facility]    Paroxysmal atrial fibrillation    Mixed hyperlipidemia    Pleural effusion    HTN (hypertension), benign    CAD in native artery    Pneumonia due to organism    Acute on chronic respiratory failure with hypoxia and hypercapnia (HCC)    COPD (chronic obstructive pulmonary disease) ([facility]    Acquired hypothyroidism    Severe protein-calorie malnutrition ([facility]    ST elevation myocardial infarction (STEMI) [facility])       Discharge Diagnoses: as above    Consults: cardiology, pulmonary/intensive care, ID and general surgery    Procedures: see chart    Hospital Course: patient was admitted with shortness of breath. She was found to be in acute respiratory failure. She was intubated and placed in the ICU. She was seen by cardiology, pulmonology and ID. She was found to suffer pneumonia and human metapneumovirus infection. She was treated with aggressive pulmonary hygiene, antibiotics and breathing treatments. She was eventually extubated. She had post intubation dysphagia. She failed swallow studies. She was seen by general surgery. She had a PEG tube placed. She tolerated tube feeds. Pt/Ot evaluated and recommended rehab.       Discharge Exam:  See progress note from today    Condition:  fair    Disposition: rehab    Patient Instructions:   Current Discharge Medication List      START taking these medications    Details   acetaminophen (TYLENOL CHILDRENS) 160 MG/5ML suspension 20.31 mLs by Per G Tube route every 4 hours as needed for Fever  Qty: 240 mL, Refills: 0      aspirin 81 MG chewable tablet 1 tablet by PEG Tube route daily  Qty: 30 tablet, Refills: 0      predniSONE (DELTASONE) 10 MG tablet Take 40 mg for 2 days, then take 30 mg for 2 days, then take 20 mg for 2 days, then take 10 mg for 2 days  Qty: 20 tablet, Refills: 0      pantoprazole sodium (PROTONIX) 40 MG PACK packet 1 packet by Per G Tube route every morning (before breakfast)  Qty: 30 each, Refills: 0      !! warfarin (COUMADIN) 2 MG tablet 1 tablet by PEG Tube route daily  Qty: 30 tablet, Refills: 0       !! - Potential duplicate medications found. Please discuss with provider. CONTINUE these medications which have CHANGED    Details   LORazepam (ATIVAN) 0.5 MG tablet 1 tablet by PEG Tube route every 8 hours as needed for Anxiety for up to 30 days. Qty: 30 tablet, Refills: 0    Associated Diagnoses: COPD with acute exacerbation ([facility]      atorvastatin (LIPITOR) 10 MG tablet 1 tablet by PEG Tube route daily  Qty: 30 tablet, Refills: 0      metoprolol tartrate (LOPRESSOR) 25 MG tablet 1 tablet by PEG Tube route 2 times daily  Qty: 60 tablet, Refills: 0      docusate sodium (COLACE, DULCOLAX) 100 MG CAPS 100 mg by PEG Tube route daily  Qty: 30 capsule, Refills: 0      levothyroxine (SYNTHROID) 75 MCG tablet 1 tablet by PEG Tube route Daily  Qty: 30 tablet, Refills: 0         CONTINUE these medications which have NOT CHANGED    Details   !! warfarin (COUMADIN) 4 MG tablet Take 4 mg by mouth daily      Nutritional Supplements (ENSURE PLUS) LIQD Take 237 mLs by mouth 3 times daily      ipratropium-albuterol (DUONEB) 0.5-2.5 (3) MG/3ML SOLN nebulizer solution Inhale 3 mLs into the lungs every 4 hours (while awake)  Qty: 360 mL      Fluticasone-Umeclidin-Vilant (TRELEGY ELLIPTA) 100-62.5-25 MCG/INH AEPB Inhale 1 actuation into the lungs daily        ! ! - Potential duplicate medications found. Please discuss with provider.       STOP taking these medications       cefTRIAXone (ROCEPHIN) 1 g injection Comments:   Reason for Stopping:         famotidine (PEPCID) 20 MG tablet Comments:   Reason for Stopping:         ammonium lactate (LAC-HYDRIN) 12 % lotion Comments:   Reason for Stopping: polyethylene glycol (GLYCOLAX) packet Comments:   Reason for Stopping:         methylPREDNISolone sodium (SOLU-MEDROL) 40 MG injection Comments:   Reason for Stopping:         Cholecalciferol (VITAMIN D3) 5000 units TABS Comments:   Reason for Stopping:         doxycycline hyclate (VIBRA-TABS) 100 MG tablet Comments:   Reason for Stopping:         dronabinol (MARINOL) 2.5 MG capsule Comments:   Reason for Stopping:         vitamin D (CHOLECALCIFEROL) 1000 UNIT TABS tablet Comments:   Reason for Stopping:         acetaminophen (TYLENOL) 500 MG tablet Comments:   Reason for Stopping:         Biotin 1000 MCG TABS Comments:   Reason for Stopping:         ascorbic acid (VITAMIN C) 500 MG tablet Comments:   Reason for Stopping:         Fish Oil-Cholecalciferol (FISH OIL + D3) 7873-4691 MG-UNIT CAPS Comments:   Reason for Stopping:         calcium-vitamin D (OSCAL-500) 500-200 MG-UNIT per tablet Comments:   Reason for Stopping:         multivitamin ([place]) per tablet Comments:   Reason for Stopping:             Activity: activity as tolerated  Diet: tube feeds with free water    Follow up with dr Obdulio valdes in 1 week. Follow up with dr Kendall hamilton in 2-3 weeks. Follow up with dr Samantha Calvo in 2-3 weeks. Follow up with dr Trinidad Hunt in 1-2 weeks. Follow up with dr Anna Begum in 2-3 weeks.       Note that over 30 minutes was spent in preparing discharge papers, discussing discharge with patient, medication review, etc.    Signed:  Melissa Case    4/8/2019  11:33 AM

## 2019-04-08 NOTE — PROGRESS NOTES
letting us see this patient in consultation. Please contact us with any questions. Office [phone] or after hours through [facility], x [phone].

## 2019-04-08 NOTE — PLAN OF CARE
Problem: Falls - Risk of:  Goal: Will remain free from falls  Description  Will remain free from falls  Outcome: Met This Shift     Problem: Gas Exchange - Impaired:  Goal: Levels of oxygenation will improve  Description  Levels of oxygenation will improve  Outcome: Met This Shift     Problem: Skin Integrity - Impaired:  Goal: Absence of new skin breakdown  Description  Absence of new skin breakdown  Outcome: Met This Shift     Problem: Gas Exchange - Impaired:  Goal: Levels of oxygenation will improve  Description  Levels of oxygenation will improve  Outcome: Met This Shift

## 2019-04-08 NOTE — DISCHARGE INSTR - COC
Continuity of Care Form    Patient Name: Margoth Reason   :  1934  MRN:  [id number]    Admit date:  3/22/2019  Discharge date:  2019    Code Status Order: Limited   Advance Directives:   [facility] Documentation     Date/Time Healthcare Directive Type of Healthcare Directive Copy in [address] Agent's Name Healthcare Agent's Phone Number    19 0851  Yes, patient has an advance directive for healthcare treatment  Durable power of  for health care  No, copy requested from family  --  --  --    19 1650  Yes, patient has an advance directive for healthcare treatment  Durable power of  for health care  No, copy requested from family  --  --  --          Admitting Physician:  David Blake MD  PCP: Evert Sheth MD    Discharging Nurse:   [address] Unit/Room#: 5693/1239-S  Discharging Unit Phone Number:     Emergency Contact:   Extended Emergency Contact Information  Primary Emergency Contact: Camryn Mcgill  Address: [address] Phone: [phone]  Mobile Phone: [phone]  Relation: Brother/Sister  Secondary Emergency Contact: [address] Phone: [phone]  Mobile Phone: [phone]  Relation: Brother/Sister    Past Surgical History:  Past Surgical History:   Procedure Laterality Date    ANGIOPLASTY  2004    APPENDECTOMY      CARDIAC PACEMAKER PLACEMENT Left 2004    [employer] Model 1298 Programmed VVIR. Failed attempt to insert atrial leads. Adequate thresholds could not be obtained trying two different electrodes. Therefore, the atrial lead was removed and the pulse generator atrial port was cancelled with an indiffferent short electrode block.       COLONOSCOPY  2011    CORONARY ANGIOPLASTY WITH STENT PLACEMENT  2009    Dilatation stent of 2009 - addition stents x 3.    DIAGNOSTIC CARDIAC CATH LAB PROCEDURE  1/9/2004    [facility]:  Subtotal RCA, partially successful PTCA - stent RCA.  DIAGNOSTIC CARDIAC CATH LAB PROCEDURE  1/12/2--4    [facility]: Acute total occlusion RCA stent.  ECHO COMPL W DOP COLOR FLOW  5/2/2013         HEMORRHOID SURGERY      THROMBECTOMY      [employer].  TOTAL HIP ARTHROPLASTY  7/30/2005 Right. 12/2005 Left.  UPPER GASTROINTESTINAL ENDOSCOPY  8/2004    With colonoscopy. Immunization History:   Immunization History   Administered Date(s) Administered    Influenza, High Dose (Fluzone 65 yrs and older) 09/12/2008, 09/25/2009, 09/03/2010, 09/30/2011, 09/28/2012, 10/11/2013, 08/29/2014, 10/28/2015, 09/07/2016, 10/11/2017, 09/12/2018    Pneumococcal 13-valent Conjugate (Ppygicg40) 01/15/2016    Pneumococcal Polysaccharide (Kxthmudtg47) 10/02/2009, 10/15/2014       Active Problems:  Patient Active Problem List   Diagnosis Code    Pulmonary hypertension ([facility] I27.20    Paroxysmal atrial fibrillation I48.0    Mixed hyperlipidemia E78.2    Pleural effusion J90    HTN (hypertension), benign I10    CAD in native artery I25.10    Pneumonia due to organism J18.9    Acute on chronic respiratory failure with hypoxia and hypercapnia (HCC) J96.21, J96.22    COPD (chronic obstructive pulmonary disease) ([facility] J44.9    Acquired hypothyroidism E03.9    Severe protein-calorie malnutrition ([facility] E43    ST elevation myocardial infarction (STEMI) ([facility] I21.3       Isolation/Infection:   Isolation          No Isolation        Patient Infection Status     Infection Encounter Level?  Onset Date Added Added By Resolved Resolved By Review Date    Metapneumoviris Yes 03/23/19 03/23/19 Respiratory Panel, Film Array   03/25/19    MRSA No 03/23/19 03/24/19 MRSA SCREENING CULTURE ONLY 03/25/19 Analilia Valle RN           Nurse Assessment:  Last Vital Signs: /72   Pulse 75   Temp 98.4 °F (36.9 °C) (Temporal)   Resp 18   Ht 5' (1.524 m)   Wt 88 lb 12.8 oz (40.3 kg)   SpO2 95%   BMI 17.34 kg/m²     Last documented pain score (0-10 scale): Pain Level: 0  Last Weight:   Wt Readings from Last 1 Encounters:   04/04/19 88 lb 12.8 oz (40.3 kg)     Mental Status:  oriented, alert, coherent, logical, thought processes intact and able to concentrate and follow conversation    IV Access:  - Peripheral IV - site  L Basilic, insertion date: 04/08/2019    Nursing Mobility/ADLs:  Walking   Dependent  Transfer  Dependent  Bathing  Dependent  Dressing  Dependent  Toileting  Dependent  Feeding  Dependent  Med Admin  Dependent  Med Delivery   crushed    Wound Care Documentation and Therapy:        Elimination:  Continence:   · Bowel: Yes  · Bladder: No  Urinary Catheter: Insertion Date: 0406/2019   Colostomy/Ileostomy/Ileal Conduit: No       Date of Last BM: 04/06/2019    Intake/Output Summary (Last 24 hours) at 4/8/2019 1131  Last data filed at 4/8/2019 0858  Gross per 24 hour   Intake 2305.67 ml   Output 800 ml   Net 1505.67 ml     I/O last 3 completed shifts: In: 2305.7 [Blood:461.7; NG/GT:1844]  Out: 800 [Urine:800]    Safety Concerns: At Risk for Falls    Impairments/Disabilities:      None    Nutrition Therapy:  Current Nutrition Therapy:   - Tube Feedings:  Standard with fiber    Routes of Feeding: Gastrostomy Tube  Liquids: No Restrictions  Daily Fluid Restriction: {CHP DME Yes amt example:773169456}  Last Modified Barium Swallow with Video (Video Swallowing Test): done on 4/4/2019/  Treatments at the Time of Hospital Discharge:   Respiratory Treatments:   Oxygen Therapy:  is on oxygen at 2 L/min per nasal cannula.   Ventilator:    - No ventilator support    Rehab Therapies: Physical Therapy and Occupational Therapy  Weight Bearing Status/Restrictions: No weight bearing restirctions  Other Medical Equipment (for information only, NOT a DME order):  hospital bed  Other Treatments:     Patient's personal belongings (please select all that are sent with patient):  None    RN SIGNATURE:  Electronically signed by Chloe Morales RN on 4/8/19 at 2:32 PM    CASE MANAGEMENT/SOCIAL WORK SECTION    Inpatient Status Date:     Readmission Risk Assessment Score:  Readmission Risk              Risk of Unplanned Readmission:        29           Discharging to Facility/ Agency   · Name:   · Address:  · Phone:  · Fax:    Dialysis Facility (if applicable)   · Name:  · Address:  · Dialysis Schedule:  · Phone:  · Fax:    / signature: {Esignature:[id number]}    PHYSICIAN SECTION    Prognosis: {Prognosis:5898745428}    Condition at Discharge: [address] Patient Condition:907150074}    Rehab Potential (if transferring to Rehab): {Prognosis:4015203987}    Recommended Labs or Other Treatments After Discharge: ***    Physician Certification: I certify the above information and transfer of Annetta Castellanos  is necessary for the continuing treatment of the diagnosis listed and that she requires {Admit to Appropriate Level of Care:27932} for {GREATER/LESS:023492707} 30 days. Update Admission H&P: {CHP DME Changes in AFSHD:544814732}    PHYSICIAN SIGNATURE: Electronically signed by Carlos Duran MD on 4/8/2019 at 11:32 AM    Follow up with dr Manisha valdes in 1 week. Follow up with dr Chito hamilton in 2-3 weeks. Follow up with dr Stoney Benito in 2-3 weeks. Follow up with dr Chloe Ferrer in 1-2 weeks. Follow up with dr Ni Harrison in 2-3 weeks.

## 2019-04-08 NOTE — PROGRESS NOTES
Prescription for Lorazepam 0.5 mg on printer. Call placed to Dr. Christofer Turner regarding prescription needing signed, message left.      Rajani Sosa RN, BSN

## 2019-04-08 NOTE — PROGRESS NOTES
Pt placed on Bipap for HS 16/6 40% Spo2=98% small full face mask and Liquicell nasal barrier applied no skin breakdown noted at this time

## 2019-04-08 NOTE — PROGRESS NOTES
Subjective:    Awake alert. Extubated (3/30/2019). Follows commands. Breathing better. Using NIPPV. With PEG. Tolerating tube feeds. Offers no complaints. Objective:    /72   Pulse 75   Temp 98.4 °F (36.9 °C) (Temporal)   Resp 18   Ht 5' (1.524 m)   Wt 88 lb 12.8 oz (40.3 kg)   SpO2 95%   BMI 17.34 kg/m²     Current medications that patient is taking have been reviewed. Heart:  RRR, no murmurs, gallops, or rubs.   Lungs:  Decreased breath sounds bilaterally  Abd: bowel sounds present, soft, nontender, nondistended, no masses  Extrem:  No cyanosis or edema    CBC with Differential:    Lab Results   Component Value Date    WBC 16.8 04/07/2019    RBC 2.56 04/07/2019    HGB 9.0 04/08/2019    HCT 29.0 04/08/2019     04/07/2019    MCV 96.9 04/07/2019    MCH 28.9 04/07/2019    MCHC 29.8 04/07/2019    RDW 18.7 04/07/2019    NRBC 0.9 03/27/2019    SEGSPCT 73 12/02/2011    BANDSPCT 27 09/20/2014    METASPCT 6 09/20/2014    LYMPHOPCT 3.5 04/06/2019    MONOPCT 1.7 04/06/2019    MYELOPCT 1.7 04/01/2019    BASOPCT 0.1 04/06/2019    MONOSABS 0.36 04/06/2019    LYMPHSABS 0.72 04/06/2019    EOSABS 0.00 04/06/2019    BASOSABS 0.00 04/06/2019     CMP:    Lab Results   Component Value Date     04/08/2019    K 4.1 04/08/2019    K 4.1 03/24/2019     04/08/2019    CO2 31 04/08/2019    BUN 35 04/08/2019    CREATININE 0.8 04/08/2019    GFRAA >60 04/08/2019    LABGLOM >60 04/08/2019    GLUCOSE 105 04/08/2019    GLUCOSE 95 03/09/2012    PROT 6.2 04/06/2019    LABALBU 3.3 04/06/2019    LABALBU 4.5 03/09/2012    CALCIUM 8.4 04/08/2019    BILITOT 0.7 04/06/2019    ALKPHOS 74 04/06/2019    AST 23 04/06/2019    ALT 27 04/06/2019     BMP:    Lab Results   Component Value Date     04/08/2019    K 4.1 04/08/2019    K 4.1 03/24/2019     04/08/2019    CO2 31 04/08/2019    BUN 35 04/08/2019    LABALBU 3.3 04/06/2019    LABALBU 4.5 03/09/2012    CREATININE 0.8 04/08/2019    CALCIUM 8.4 04/08/2019 GFRAA >60 04/08/2019    LABGLOM >60 04/08/2019    GLUCOSE 105 04/08/2019    GLUCOSE 95 03/09/2012     Magnesium:    Lab Results   Component Value Date    MG 1.7 04/06/2019     Phosphorus:    Lab Results   Component Value Date    PHOS 2.2 04/03/2019     PT/INR:    Lab Results   Component Value Date    PROTIME 14.0 04/08/2019    INR 1.2 04/08/2019     PTT:    Lab Results   Component Value Date    APTT 45.9 09/19/2014   [APTT}  ABG:    Lab Results   Component Value Date    PH 7.353 04/01/2019    PCO2 64.5 04/01/2019    PO2 144.2 04/01/2019    HCO3 35.1 04/01/2019    BE 7.9 04/01/2019    O2SAT 98.8 04/01/2019        Assessment:    Patient Active Problem List   Diagnosis    Pulmonary [facility] ([facility]    Paroxysmal atrial fibrillation    Mixed hyperlipidemia    Pleural effusion    HTN (hypertension), benign    CAD in native artery    Pneumonia due to organism    Acute on chronic respiratory failure with hypoxia and hypercapnia (HCC)    COPD (chronic obstructive pulmonary disease) ([facility]    Acquired hypothyroidism    Severe protein-calorie malnutrition (HCC)    ST elevation myocardial infarction (STEMI) ([facility]       Plan:  Stable. Rate controlled. INR supratherapeutic. Cardiology consulted. Continue aggressive lipid therapy  Stable. Blood pressure ok, continue current medications  Continue medical management of ASCAD. Possible acute bacterial pneumonia on top of metapneumo virus infection. ID on board. On cefepime  Secondary to COPD exacerbation, pneumonia and human metapneumovirus infection. Extubated. Speech pathology for swallow evaluation. Continue aggressive pulmonary hygiene. Continue breathing treatments. Continue synthroid. Supplement diet. Cardiology on board. Medical management. General surgery consulted for PEG. Pt/Ot evaluations for discharge planning. Hui Anand to discharge to rehab.     Maryellen Nearing    11:23 AM  4/8/2019

## 2019-04-08 NOTE — CARE COORDINATION
Notified Yuridia Diaz of pt's room number [phone] for [employer]. Updated pt that Yuridia Diaz will see her at [facility].

## 2021-08-18 ENCOUNTER — CLINICAL DOCUMENTATION (OUTPATIENT)
Dept: PHARMACY | Age: 86
End: 2021-08-18

## 2021-08-18 DIAGNOSIS — I48.0 PAROXYSMAL ATRIAL FIBRILLATION (HCC): Primary | ICD-10-CM

## 2025-07-23 NOTE — SIGNIFICANT EVENT
6/4/25  cmp, lipid and A1c hyperlinked to .     Called to louis Reese . She was bradycardiac HR 45. She is oriented only to self but stated she have some chest pressure. She was also diaphoretic. She was put on BiPAP. STAT EKG showed ST elevtaed in lead II,III and AVF and also V4,V5,V6. Pt given 1 mg of morphine and aspirin 325 mg.   BP was low, 500 cc bolus ordered. I talked to her niece (POA),updated about what happened. She said she does not want any aggressive measures. She still want her to be limited code but she wants Dr Pj Manzanares to see her. I talked to Dr Radha Garcia her age,co-morbidites  and limited code status she is not candidate for cath lab. He recommended medical management and IVF for low BP. She is on coumadin INR 2.5 so no further anticoagulation will be given  Will hold her transfer   Addendum ICU Attending Statement     Niyah Davis was seen, examined and discussed with the multi-disciplinary ICU team during rounds. A addendum note may be separate to the residents note. If not then, I have personally seen and examined the patient and the key elements of the encounter were performed by me (> 85 % time). The medications & laboratory data was discussed and adjusted where necessary. The radiographic images were reviewed either as a group or with radiologist.  Any changes are document or changed if felt dis-concordant with the exam or history. The above findings were corroborated, plans confirmed and changes made if needed. Family was updated at the bedside as available. Key issues of the case were discussed among consultants. Critical Care time is documented if appropriate.       Karl Chung DO, MPH  Professor of Medicine

## (undated) DEVICE — BLOCK BITE 60FR RUBBER ADLT DENTAL

## (undated) DEVICE — Device

## (undated) DEVICE — CANNULA NSL ORAL AD FOR CAPNOFLEX CO2 O2 AIRLFE

## (undated) DEVICE — DEFENDO AIR WATER SUCTION AND BIOPSY VALVE KIT FOR  OLYMPUS: Brand: DEFENDO AIR/WATER/SUCTION AND BIOPSY VALVE

## (undated) DEVICE — BINDER ABD SM MED 30 IN - 45 IN HT 12 IN

## (undated) DEVICE — GAUZE,SPONGE,POST-OP,4X3,STRL,LF: Brand: MEDLINE